# Patient Record
Sex: FEMALE | Race: WHITE | Employment: FULL TIME | URBAN - METROPOLITAN AREA
[De-identification: names, ages, dates, MRNs, and addresses within clinical notes are randomized per-mention and may not be internally consistent; named-entity substitution may affect disease eponyms.]

---

## 2017-08-06 ENCOUNTER — CARE COORDINATION (OUTPATIENT)
Dept: CASE MANAGEMENT | Age: 67
End: 2017-08-06

## 2017-08-11 ENCOUNTER — CARE COORDINATION (OUTPATIENT)
Dept: CASE MANAGEMENT | Age: 67
End: 2017-08-11

## 2018-05-21 PROBLEM — L40.9 PSORIASIS: Status: ACTIVE | Noted: 2018-05-21

## 2018-05-21 PROBLEM — E66.01 MORBID OBESITY (HCC): Status: ACTIVE | Noted: 2018-05-21

## 2019-10-01 ENCOUNTER — TELEPHONE (OUTPATIENT)
Dept: PHARMACY | Facility: CLINIC | Age: 69
End: 2019-10-01

## 2020-03-10 PROBLEM — E13.9 DIABETES 1.5, MANAGED AS TYPE 2 (HCC): Status: ACTIVE | Noted: 2020-03-10

## 2020-03-10 PROBLEM — E78.00 HIGH CHOLESTEROL: Status: ACTIVE | Noted: 2020-03-10

## 2020-03-10 PROBLEM — F41.9 ANXIETY: Status: ACTIVE | Noted: 2020-03-10

## 2021-05-18 ENCOUNTER — HOSPITAL ENCOUNTER (OUTPATIENT)
Dept: ULTRASOUND IMAGING | Age: 71
Discharge: HOME | End: 2021-05-18
Payer: COMMERCIAL

## 2021-05-18 ENCOUNTER — HOSPITAL ENCOUNTER (OUTPATIENT)
Dept: WOMENS IMAGING | Age: 71
Discharge: HOME | End: 2021-05-18
Payer: COMMERCIAL

## 2021-05-18 DIAGNOSIS — N63.0 LUMP OR MASS IN BREAST: ICD-10-CM

## 2021-05-18 DIAGNOSIS — N63.20 MASS OF LEFT BREAST: ICD-10-CM

## 2021-05-18 PROCEDURE — G0279 TOMOSYNTHESIS, MAMMO: HCPCS

## 2021-05-18 PROCEDURE — 76642 ULTRASOUND BREAST LIMITED: CPT

## 2021-08-13 PROBLEM — F13.20 SEDATIVE, HYPNOTIC OR ANXIOLYTIC DEPENDENCE, UNCOMPLICATED (HCC): Status: ACTIVE | Noted: 2021-08-13

## 2021-08-13 PROBLEM — F13.99 SEDATIVE, HYPNOTIC OR ANXIOLYTIC USE, UNSPECIFIED WITH UNSPECIFIED SEDATIVE, HYPNOTIC OR ANXIOLYTIC-INDUCED DISORDER (HCC): Status: ACTIVE | Noted: 2021-08-13

## 2021-08-13 PROBLEM — F13.29 SEDATIVE, HYPNOTIC OR ANXIOLYTIC DEPENDENCE WITH UNSPECIFIED SEDATIVE, HYPNOTIC OR ANXIOLYTIC-INDUCED DISORDER (HCC): Status: ACTIVE | Noted: 2021-08-13

## 2021-08-17 ENCOUNTER — APPOINTMENT (OUTPATIENT)
Dept: GENERAL RADIOLOGY | Age: 71
DRG: 177 | End: 2021-08-17
Payer: COMMERCIAL

## 2021-08-17 ENCOUNTER — HOSPITAL ENCOUNTER (INPATIENT)
Age: 71
LOS: 10 days | Discharge: HOME | DRG: 177 | End: 2021-08-27
Attending: EMERGENCY MEDICINE | Admitting: INTERNAL MEDICINE
Payer: COMMERCIAL

## 2021-08-17 DIAGNOSIS — R73.9 HYPERGLYCEMIA: ICD-10-CM

## 2021-08-17 DIAGNOSIS — J96.01 ACUTE RESPIRATORY FAILURE WITH HYPOXEMIA (HCC): ICD-10-CM

## 2021-08-17 DIAGNOSIS — J18.9 COMMUNITY ACQUIRED PNEUMONIA OF LEFT LOWER LOBE OF LUNG: Primary | ICD-10-CM

## 2021-08-17 PROBLEM — J96.00 ACUTE RESPIRATORY FAILURE (HCC): Status: ACTIVE | Noted: 2021-08-17

## 2021-08-17 LAB
A/G RATIO: 1.2 (ref 1.1–2.2)
ALBUMIN SERPL-MCNC: 3.5 G/DL (ref 3.4–5)
ALP BLD-CCNC: 73 U/L (ref 40–129)
ALT SERPL-CCNC: 17 U/L (ref 10–40)
ANION GAP SERPL CALCULATED.3IONS-SCNC: 14 MMOL/L (ref 3–16)
AST SERPL-CCNC: 23 U/L (ref 15–37)
BASOPHILS ABSOLUTE: 0 K/UL (ref 0–0.2)
BASOPHILS RELATIVE PERCENT: 0 %
BILIRUB SERPL-MCNC: 0.3 MG/DL (ref 0–1)
BILIRUBIN URINE: NEGATIVE
BLOOD, URINE: NEGATIVE
BUN BLDV-MCNC: 17 MG/DL (ref 7–20)
CALCIUM SERPL-MCNC: 9 MG/DL (ref 8.3–10.6)
CHLORIDE BLD-SCNC: 91 MMOL/L (ref 99–110)
CLARITY: CLEAR
CO2: 23 MMOL/L (ref 21–32)
COLOR: YELLOW
CREAT SERPL-MCNC: 1.1 MG/DL (ref 0.6–1.2)
EOSINOPHILS ABSOLUTE: 0 K/UL (ref 0–0.6)
EOSINOPHILS RELATIVE PERCENT: 0 %
EPITHELIAL CELLS, UA: 2 /HPF (ref 0–5)
GFR AFRICAN AMERICAN: 59
GFR NON-AFRICAN AMERICAN: 49
GLOBULIN: 3 G/DL
GLUCOSE BLD-MCNC: 377 MG/DL (ref 70–99)
GLUCOSE BLD-MCNC: 396 MG/DL (ref 70–99)
GLUCOSE URINE: >=1000 MG/DL
HCT VFR BLD CALC: 36.5 % (ref 36–48)
HEMOGLOBIN: 12.1 G/DL (ref 12–16)
HYALINE CASTS: 4 /LPF (ref 0–8)
KETONES, URINE: NEGATIVE MG/DL
LACTIC ACID: 1.9 MMOL/L (ref 0.4–2)
LEUKOCYTE ESTERASE, URINE: ABNORMAL
LIPASE: 28 U/L (ref 13–60)
LYMPHOCYTES ABSOLUTE: 0.4 K/UL (ref 1–5.1)
LYMPHOCYTES RELATIVE PERCENT: 7.8 %
MCH RBC QN AUTO: 31.3 PG (ref 26–34)
MCHC RBC AUTO-ENTMCNC: 33.2 G/DL (ref 31–36)
MCV RBC AUTO: 94.2 FL (ref 80–100)
MICROSCOPIC EXAMINATION: YES
MONOCYTES ABSOLUTE: 0.3 K/UL (ref 0–1.3)
MONOCYTES RELATIVE PERCENT: 5.4 %
NEUTROPHILS ABSOLUTE: 4.1 K/UL (ref 1.7–7.7)
NEUTROPHILS RELATIVE PERCENT: 86.8 %
NITRITE, URINE: NEGATIVE
PDW BLD-RTO: 13.7 % (ref 12.4–15.4)
PERFORMED ON: ABNORMAL
PH UA: 5 (ref 5–8)
PLATELET # BLD: 179 K/UL (ref 135–450)
PMV BLD AUTO: 8.9 FL (ref 5–10.5)
POTASSIUM REFLEX MAGNESIUM: 4.2 MMOL/L (ref 3.5–5.1)
PRO-BNP: 1769 PG/ML (ref 0–124)
PROCALCITONIN: 0.43 NG/ML (ref 0–0.15)
PROTEIN UA: NEGATIVE MG/DL
RBC # BLD: 3.87 M/UL (ref 4–5.2)
RBC UA: 2 /HPF (ref 0–4)
SODIUM BLD-SCNC: 128 MMOL/L (ref 136–145)
SPECIFIC GRAVITY UA: 1.01 (ref 1–1.03)
TOTAL PROTEIN: 6.5 G/DL (ref 6.4–8.2)
TROPONIN: <0.01 NG/ML
URINE REFLEX TO CULTURE: ABNORMAL
URINE TYPE: ABNORMAL
UROBILINOGEN, URINE: 0.2 E.U./DL
WBC # BLD: 4.8 K/UL (ref 4–11)
WBC UA: 6 /HPF (ref 0–5)

## 2021-08-17 PROCEDURE — 2580000003 HC RX 258: Performed by: INTERNAL MEDICINE

## 2021-08-17 PROCEDURE — 87641 MR-STAPH DNA AMP PROBE: CPT

## 2021-08-17 PROCEDURE — U0003 INFECTIOUS AGENT DETECTION BY NUCLEIC ACID (DNA OR RNA); SEVERE ACUTE RESPIRATORY SYNDROME CORONAVIRUS 2 (SARS-COV-2) (CORONAVIRUS DISEASE [COVID-19]), AMPLIFIED PROBE TECHNIQUE, MAKING USE OF HIGH THROUGHPUT TECHNOLOGIES AS DESCRIBED BY CMS-2020-01-R: HCPCS

## 2021-08-17 PROCEDURE — 87040 BLOOD CULTURE FOR BACTERIA: CPT

## 2021-08-17 PROCEDURE — 85025 COMPLETE CBC W/AUTO DIFF WBC: CPT

## 2021-08-17 PROCEDURE — 6360000002 HC RX W HCPCS: Performed by: NURSE PRACTITIONER

## 2021-08-17 PROCEDURE — 94761 N-INVAS EAR/PLS OXIMETRY MLT: CPT

## 2021-08-17 PROCEDURE — 71045 X-RAY EXAM CHEST 1 VIEW: CPT

## 2021-08-17 PROCEDURE — 84145 PROCALCITONIN (PCT): CPT

## 2021-08-17 PROCEDURE — XW033E5 INTRODUCTION OF REMDESIVIR ANTI-INFECTIVE INTO PERIPHERAL VEIN, PERCUTANEOUS APPROACH, NEW TECHNOLOGY GROUP 5: ICD-10-PCS | Performed by: INTERNAL MEDICINE

## 2021-08-17 PROCEDURE — 96375 TX/PRO/DX INJ NEW DRUG ADDON: CPT

## 2021-08-17 PROCEDURE — 6370000000 HC RX 637 (ALT 250 FOR IP): Performed by: INTERNAL MEDICINE

## 2021-08-17 PROCEDURE — 6360000002 HC RX W HCPCS: Performed by: INTERNAL MEDICINE

## 2021-08-17 PROCEDURE — 81001 URINALYSIS AUTO W/SCOPE: CPT

## 2021-08-17 PROCEDURE — 99285 EMERGENCY DEPT VISIT HI MDM: CPT

## 2021-08-17 PROCEDURE — 84484 ASSAY OF TROPONIN QUANT: CPT

## 2021-08-17 PROCEDURE — 83880 ASSAY OF NATRIURETIC PEPTIDE: CPT

## 2021-08-17 PROCEDURE — 83690 ASSAY OF LIPASE: CPT

## 2021-08-17 PROCEDURE — 96365 THER/PROPH/DIAG IV INF INIT: CPT

## 2021-08-17 PROCEDURE — 94640 AIRWAY INHALATION TREATMENT: CPT

## 2021-08-17 PROCEDURE — 2580000003 HC RX 258: Performed by: NURSE PRACTITIONER

## 2021-08-17 PROCEDURE — 93005 ELECTROCARDIOGRAM TRACING: CPT | Performed by: NURSE PRACTITIONER

## 2021-08-17 PROCEDURE — 80053 COMPREHEN METABOLIC PANEL: CPT

## 2021-08-17 PROCEDURE — U0005 INFEC AGEN DETEC AMPLI PROBE: HCPCS

## 2021-08-17 PROCEDURE — 6370000000 HC RX 637 (ALT 250 FOR IP): Performed by: NURSE PRACTITIONER

## 2021-08-17 PROCEDURE — 2060000000 HC ICU INTERMEDIATE R&B

## 2021-08-17 PROCEDURE — 83605 ASSAY OF LACTIC ACID: CPT

## 2021-08-17 PROCEDURE — 2700000000 HC OXYGEN THERAPY PER DAY

## 2021-08-17 RX ORDER — METHYLPREDNISOLONE SODIUM SUCCINATE 125 MG/2ML
125 INJECTION, POWDER, LYOPHILIZED, FOR SOLUTION INTRAMUSCULAR; INTRAVENOUS ONCE
Status: COMPLETED | OUTPATIENT
Start: 2021-08-17 | End: 2021-08-17

## 2021-08-17 RX ORDER — PANTOPRAZOLE SODIUM 40 MG/1
40 TABLET, DELAYED RELEASE ORAL
Status: DISCONTINUED | OUTPATIENT
Start: 2021-08-18 | End: 2021-08-26

## 2021-08-17 RX ORDER — IPRATROPIUM BROMIDE AND ALBUTEROL SULFATE 2.5; .5 MG/3ML; MG/3ML
1 SOLUTION RESPIRATORY (INHALATION)
Status: DISCONTINUED | OUTPATIENT
Start: 2021-08-18 | End: 2021-08-18

## 2021-08-17 RX ORDER — HYDROCODONE BITARTRATE AND ACETAMINOPHEN 5; 325 MG/1; MG/1
1 TABLET ORAL EVERY 8 HOURS PRN
Status: DISCONTINUED | OUTPATIENT
Start: 2021-08-17 | End: 2021-08-27 | Stop reason: HOSPADM

## 2021-08-17 RX ORDER — METHYLPREDNISOLONE SODIUM SUCCINATE 40 MG/ML
40 INJECTION, POWDER, LYOPHILIZED, FOR SOLUTION INTRAMUSCULAR; INTRAVENOUS EVERY 6 HOURS
Status: DISCONTINUED | OUTPATIENT
Start: 2021-08-18 | End: 2021-08-18

## 2021-08-17 RX ORDER — ALBUTEROL SULFATE 90 UG/1
4 AEROSOL, METERED RESPIRATORY (INHALATION) ONCE
Status: COMPLETED | OUTPATIENT
Start: 2021-08-17 | End: 2021-08-17

## 2021-08-17 RX ORDER — LEVOFLOXACIN 5 MG/ML
500 INJECTION, SOLUTION INTRAVENOUS EVERY 24 HOURS
Status: DISCONTINUED | OUTPATIENT
Start: 2021-08-17 | End: 2021-08-21

## 2021-08-17 RX ORDER — IPRATROPIUM BROMIDE AND ALBUTEROL SULFATE 2.5; .5 MG/3ML; MG/3ML
1 SOLUTION RESPIRATORY (INHALATION) ONCE
Status: DISCONTINUED | OUTPATIENT
Start: 2021-08-17 | End: 2021-08-17

## 2021-08-17 RX ORDER — LISINOPRIL AND HYDROCHLOROTHIAZIDE 20; 12.5 MG/1; MG/1
1 TABLET ORAL DAILY
Status: DISCONTINUED | OUTPATIENT
Start: 2021-08-18 | End: 2021-08-27 | Stop reason: HOSPADM

## 2021-08-17 RX ORDER — SODIUM CHLORIDE 9 MG/ML
INJECTION, SOLUTION INTRAVENOUS CONTINUOUS
Status: DISCONTINUED | OUTPATIENT
Start: 2021-08-17 | End: 2021-08-18

## 2021-08-17 RX ORDER — PROMETHAZINE HYDROCHLORIDE AND CODEINE PHOSPHATE 6.25; 1 MG/5ML; MG/5ML
5 SOLUTION ORAL ONCE
Status: COMPLETED | OUTPATIENT
Start: 2021-08-17 | End: 2021-08-17

## 2021-08-17 RX ORDER — ALPRAZOLAM 0.5 MG/1
0.5 TABLET ORAL 3 TIMES DAILY PRN
Status: DISCONTINUED | OUTPATIENT
Start: 2021-08-17 | End: 2021-08-27 | Stop reason: HOSPADM

## 2021-08-17 RX ORDER — ALLOPURINOL 300 MG/1
300 TABLET ORAL DAILY
Status: DISCONTINUED | OUTPATIENT
Start: 2021-08-18 | End: 2021-08-27 | Stop reason: HOSPADM

## 2021-08-17 RX ADMIN — METHYLPREDNISOLONE SODIUM SUCCINATE 125 MG: 125 INJECTION, POWDER, FOR SOLUTION INTRAMUSCULAR; INTRAVENOUS at 19:09

## 2021-08-17 RX ADMIN — SODIUM CHLORIDE: 9 INJECTION, SOLUTION INTRAVENOUS at 22:19

## 2021-08-17 RX ADMIN — Medication 5 ML: at 19:08

## 2021-08-17 RX ADMIN — Medication 4 PUFF: at 18:41

## 2021-08-17 RX ADMIN — LEVOFLOXACIN 500 MG: 5 INJECTION, SOLUTION INTRAVENOUS at 23:22

## 2021-08-17 RX ADMIN — INSULIN LISPRO 3 UNITS: 100 INJECTION, SOLUTION INTRAVENOUS; SUBCUTANEOUS at 23:23

## 2021-08-17 RX ADMIN — AZITHROMYCIN MONOHYDRATE 500 MG: 500 INJECTION, POWDER, LYOPHILIZED, FOR SOLUTION INTRAVENOUS at 20:04

## 2021-08-17 RX ADMIN — CEFTRIAXONE 1000 MG: 1 INJECTION, POWDER, FOR SOLUTION INTRAMUSCULAR; INTRAVENOUS at 19:19

## 2021-08-17 ASSESSMENT — ENCOUNTER SYMPTOMS
COUGH: 1
NAUSEA: 0
SORE THROAT: 0
SINUS PRESSURE: 0
VOMITING: 0
CONSTIPATION: 0
SINUS PAIN: 0
DIARRHEA: 0
COLOR CHANGE: 0
ABDOMINAL DISTENTION: 0
SHORTNESS OF BREATH: 0
ABDOMINAL PAIN: 0
BACK PAIN: 0

## 2021-08-17 ASSESSMENT — PAIN SCALES - WONG BAKER
WONGBAKER_NUMERICALRESPONSE: 0
WONGBAKER_NUMERICALRESPONSE: 0

## 2021-08-17 ASSESSMENT — PAIN SCALES - GENERAL: PAINLEVEL_OUTOF10: 0

## 2021-08-17 NOTE — ED NOTES
Patient aware of need for urine sample, patient unable to urinate at this time. Provider aware. Patient will call out when able to provide sample.        Emily Nunes RN  08/17/21 8320

## 2021-08-17 NOTE — ED NOTES
Bed: E-42  Expected date: 8/17/21  Expected time: 5:07 PM  Means of arrival: [facility]  Comments:  70F congestion/cough      Kae Soliman RN  08/17/21 6295

## 2021-08-17 NOTE — ED PROVIDER NOTES
Attending Supervisory Note/Shared Visit   I have personally performed a face to face diagnostic evaluation on this patient. I have reviewed the mid-levels findings and agree. History and Exam by me shows her white female in no acute distress. She has a 5-day history of fatigue and cough. Heart: Regular rate and rhythm. No murmurs or gallops noted. Lungs: Breath sounds decreased in the bases bilaterally with bibasilar rales. No wheezes. No retractions or accessory muscle use. Speaks in full sentences. Abdomen: Soft, nondistended, nontender. Musculoskeletal: No lower extremity edema. Intact symmetrical distal pulses. EKG: Normal sinus rhythm, rate of 92, left ventricular hypertrophy. Rhythm strip shows sinus rhythm with a rate of 92, LA interval 140 ms,  ms with no other ectopy as interpreted by me. No old EKG available for comparison. Chest x-ray: Left mid and basilar infiltrates consistent with pneumonia. Reviewed. H&H are normal.  White blood count 4800 with 87 neutrophils and 8 lymphs. Sodium 128 with potassium 4.2.  BUN of 17 with a creatinine 1.1. Glucose 377. Liver enzymes are normal.  Lipase of 28. Troponin less than 0.01. BNP of 1769. Lactic acid of 1.9. Procalcitonin 0.43. Patient's clinical presentation is consistent with pneumonia. IV antibiotics were initiated. The patient's primary care provider was consulted for admission. Test results, diagnosis, and treatment plan were discussed with the patient. She understands the treatment plan and need for admission and is agreeable.       (Please note that portions of this note were completed with a voice recognition program.  Efforts were made to edit the dictations but occasionally words are mis-transcribed.)    Sary Sanderson MD  Attending Emergency Physician        Checo Funk MD  08/17/21 3929

## 2021-08-17 NOTE — ED PROVIDER NOTES
[address] [facility]        Pt Name: Abraham Amaya  MRN: [id number]  [place] 1950  Date of evaluation: 8/17/2021  Provider: ELENO Mitchell - CNP  PCP: Fito Fallon MD  Note Started: 5:40 PM EDT        I have seen and evaluated this patient with my supervising physician Pablito Richter MD.    [facility]       Chief Complaint   Patient presents with    Cough     x5 days, productive with thick white mucus. Denies respiratory HX, deneis chest pain, dneies shortness of breath. 96% on room air on scene per EMS, 86% on room air on arrival.    Other     No appetite       HISTORY OF PRESENT ILLNESS   (Location, Timing/Onset, Context/Setting, Quality, Duration, Modifying Factors, Severity, Associated Signs and Symptoms)  Note limiting factors. Chief Complaint: fatigue     Abraham Amaya is a 69 y.o. female with PMH sniffing for HTN, HLD, and DMII who presents to the emergency department today complaining of cough and fatigue. Onset was 5 days ago. Symptoms started spontaneously and been constant. She has no energy and has not been eating or drinking. She denies chest pain and shortness of breath. No abdominal pain. No headache dizziness numbness or weakness. On arrival to ER she was 86% on room air. Nursing Notes were all reviewed and agreed with or any disagreements were addressed in the HPI. REVIEW OF SYSTEMS    (2-9 systems for level 4, 10 or more for level 5)     Review of Systems   Constitutional: Positive for activity change, appetite change and fatigue. Negative for diaphoresis and fever. HENT: Negative for congestion, sinus pressure, sinus pain and sore throat. Eyes: Negative for visual disturbance. Respiratory: Positive for cough. Negative for shortness of breath. Cardiovascular: Negative for chest pain.    Gastrointestinal: Negative for abdominal distention, abdominal pain, constipation, diarrhea, nausea and vomiting. Genitourinary: Negative for dysuria, flank pain, frequency and hematuria. Musculoskeletal: Negative for back pain, neck pain and neck stiffness. Skin: Negative for color change, rash and wound. Allergic/Immunologic: Negative for immunocompromised state. Neurological: Positive for weakness. Negative for dizziness, syncope, light-headedness, numbness and headaches. Hematological: Negative for adenopathy. Psychiatric/Behavioral: Negative for confusion. All other systems reviewed and are negative. Positives and Pertinent negatives as per HPI. Except as noted above in the ROS, all other systems were reviewed and negative. PAST MEDICAL HISTORY     Past Medical History:   Diagnosis Date    Anxiety     Arthritis     Chronic back pain     Depression     Diabetes 1.5, managed as type 2 ([facility] [address]     Hypertension     Obesity     Psoriasis          SURGICAL HISTORY     Past Surgical History:   Procedure Laterality Date     SECTION      LEG SURGERY Right          CURRENTMEDICATIONS       Previous Medications    ALLOPURINOL (ZYLOPRIM) 300 MG TABLET    Take 1 tablet by mouth daily    ALPRAZOLAM (XANAX) 0.5 MG TABLET    TAKE ONE TABLET BY MOUTH THREE TIMES A DAY AS NEEDED FOR ANXIETY OR SLEEP    COAL TAR EXTRACT 2 % OINT    Apply bid    FUROSEMIDE (LASIX) 40 MG TABLET    Take 1 tablet by mouth 2 times daily    HYDROCODONE-ACETAMINOPHEN (NORCO) 5-325 MG PER TABLET    Take 1 tablet by mouth every 8 hours as needed for Pain for up to 30 days. KETOCONAZOLE (NIZORAL) 2 % CREAM    Apply topically daily.     LEVOFLOXACIN (LEVAQUIN) 500 MG TABLET    Take 1 tablet by mouth daily for 7 days    LISINOPRIL-HYDROCHLOROTHIAZIDE (PRINZIDE;ZESTORETIC) 20-12.5 MG PER TABLET    TAKE ONE TABLET BY MOUTH DAILY    METFORMIN (GLUCOPHAGE) 1000 MG TABLET    TAKE ONE TABLET BY MOUTH TWICE A DAY WITH MEALS    NYSTATIN (MYCOSTATIN) 261221 UNIT/ML SUSPENSION    TAKE FIVE MILLILITERS BY MOUTH FOUR TIMES A DAY FOR 10 DAYS (RETAIN IN MOUTH AS LONG AS POSSIBLE)    PREDNISONE (DELTASONE) 20 MG TABLET    2 tab po daily for 4 days then 1 tab po daily for 3 days    TIZANIDINE (ZANAFLEX) 4 MG TABLET    Take 1 tablet by mouth nightly as needed (pain)         ALLERGIES     Penicillins    FAMILYHISTORY       Family History   Problem Relation Age of Onset    High Blood Pressure Mother     High Blood Pressure Father           SOCIAL HISTORY       Social History     Tobacco Use    Smoking status: Never Smoker    Smokeless tobacco: Never Used   Substance Use Topics    Alcohol use: No    Drug use: No       SCREENINGS             PHYSICAL EXAM    (up to 7 for level 4, 8 or more for level 5)     ED Triage Vitals [08/17/21 1726]   BP Temp Temp Source Pulse Resp SpO2 Height Weight   (!) 125/51 99.3 °F (37.4 °C) Oral 106 27 (S) (!) 86 % -- --       Physical Exam  Vitals and nursing note reviewed. Constitutional:       General: She is not in acute distress. Appearance: Normal appearance. She is well-developed. She is morbidly obese. She is not toxic-appearing. HENT:      Head: Normocephalic and atraumatic. Mouth/Throat:      Mouth: Mucous membranes are dry. Eyes:      General: No scleral icterus. Conjunctiva/sclera: Conjunctivae normal.   Neck:      Vascular: No JVD. Cardiovascular:      Rate and Rhythm: Regular rhythm. Tachycardia present. Heart sounds: Normal heart sounds. Pulmonary:      Effort: Pulmonary effort is normal. No respiratory distress. Breath sounds: Rales present. Abdominal:      General: There is no distension. Palpations: Abdomen is soft. Abdomen is not rigid. Tenderness: There is no abdominal tenderness. Musculoskeletal:         General: Normal range of motion. Cervical back: Full passive range of motion without pain and neck supple. No rigidity. Right lower leg: Edema present. Left lower leg: Edema present. Skin:     General: Skin is warm and dry. Capillary Refill: Capillary refill takes less than 2 seconds. Findings: No rash. Neurological:      General: No focal deficit present. Mental Status: She is alert and oriented to person, place, and time. Cranial Nerves: Cranial nerves are intact. Sensory: Sensation is intact. Motor: Motor function is intact.    Psychiatric:         Mood and Affect: Mood normal.         DIAGNOSTIC RESULTS   LABS:    Labs Reviewed   CBC WITH AUTO DIFFERENTIAL - Abnormal; Notable for the following components:       Result Value    RBC 3.87 (*)     Lymphocytes Absolute 0.4 (*)     All other components within normal limits    Narrative:     Performed at:  [address] [place] [address]   Phone [phone]   COMPREHENSIVE METABOLIC PANEL W/ REFLEX TO MG FOR LOW K - Abnormal; Notable for the following components:    Sodium 128 (*)     Chloride 91 (*)     Glucose 377 (*)     GFR Non- 49 (*)     GFR  59 (*)     All other components within normal limits    Narrative:     Performed at:  [address] [place] [address]   Phone [phone]   BRAIN NATRIURETIC PEPTIDE - Abnormal; Notable for the following components:    Pro-BNP 1,769 (*)     All other components within normal limits    Narrative:     Performed at:  [address] [place] [address]   Phone [phone]   PROCALCITONIN - Abnormal; Notable for the following components:    Procalcitonin 0.43 (*)     All other components within normal limits    Narrative:     Performed at:  [address] [place] [address]   Phone [phone]   CULTURE, BLOOD 1   CULTURE, BLOOD 2   LIPASE    Narrative:     Performed at:  [facility]  [address] [facility] [place] [address]   Phone [phone]   TROPONIN    Narrative:     Performed at:  [facility]  [address] [place] [address]   Phone [phone]   LACTIC ACID, PLASMA    Narrative:     Performed at:  [facility]  [address] [facility] [place] [address] [place] [address]   Phone [phone]   URINE RT REFLEX TO CULTURE   COVID-19       When ordered only abnormal lab results are displayed. All other labs were within normal range or not returned as of this dictation. EKG: When ordered, EKG's are interpreted by the Emergency Department Physician in the absence of a cardiologist.  Please see their note for interpretation of EKG. RADIOLOGY:   Non-plain film images such as CT, Ultrasound and MRI are read by the radiologist. Plain radiographic images are visualized and preliminarily interpreted by the ED Provider with the below findings:        Interpretation per the Radiologist below, if available at the time of this note:    XR CHEST PORTABLE   Final Result   Left mid and bibasilar infiltrates consistent with pneumonia. No results found. PROCEDURES   Unless otherwise noted below, none     Procedures    CRITICAL CARE TIME   CRITICAL CARE NOTE:  There was a high probability of clinically significant life-threatening deterioration of the patient's condition requiring my urgent intervention. Total critical care time was at least 40 minutes. This includes vital sign monitoring, pulse oximetry monitoring, telemetry monitoring, clinical response to the IV medications, reviewing the nursing notes, consultation time, dictation/documentation time, and interpretation of the labwork. This excludes any separately billable procedures performed.       CONSULTS:  IP CONSULT TO PRIMARY CARE PROVIDER      EMERGENCY DEPARTMENT COURSE and DIFFERENTIAL DIAGNOSIS/MDM:   Vitals:    Vitals:    08/17/21 1812 08/17/21 1841 time was given to answer questions, a plan was proposed and they agreed with plan. FINAL IMPRESSION      1. Community acquired pneumonia of left lower lobe of lung    2. Acute respiratory failure with hypoxemia (HCC)    3. Hyperglycemia          DISPOSITION/PLAN   DISPOSITION Admitted 08/17/2021 08:00:33 PM      PATIENT REFERRED TO:  No follow-up provider specified.     DISCHARGE MEDICATIONS:  New Prescriptions    No medications on file       DISCONTINUED MEDICATIONS:  Discontinued Medications    ALPRAZOLAM (XANAX) 0.5 MG TABLET    TAKE ONE TABLET BY MOUTH THREE TIMES A DAY AS NEEDED FOR SLEEP OR ANXIETY FOR UP TO 30 DAYS              (Please note that portions of this note were completed with a voice recognition program.  Efforts were made to edit the dictations but occasionally words are mis-transcribed.)    ELENO Peraza CNP (electronically signed)            ELENO Peraza CNP  08/17/21 2003

## 2021-08-17 NOTE — ED TRIAGE NOTES
[facility] with complaint of cough and malaise x 25 days. Per EMS report: Vital signs stable during transport. On arrival patient noted to have SPO2 of 86% on room air. Patient denies cardiac or respiratory hx. Reports productive cough with thick white sputum at home. O2 paced on arrival. NP to bedside. Patient denies chest pain, shortness of breath, denies fever at home. Patient resting in bed, respirations even and easy.

## 2021-08-18 ENCOUNTER — APPOINTMENT (OUTPATIENT)
Dept: CT IMAGING | Age: 71
DRG: 177 | End: 2021-08-18
Payer: COMMERCIAL

## 2021-08-18 LAB
A/G RATIO: 0.9 (ref 1.1–2.2)
ALBUMIN SERPL-MCNC: 3.3 G/DL (ref 3.4–5)
ALP BLD-CCNC: 77 U/L (ref 40–129)
ALT SERPL-CCNC: 18 U/L (ref 10–40)
ANION GAP SERPL CALCULATED.3IONS-SCNC: 21 MMOL/L (ref 3–16)
AST SERPL-CCNC: 24 U/L (ref 15–37)
BILIRUB SERPL-MCNC: <0.2 MG/DL (ref 0–1)
BUN BLDV-MCNC: 17 MG/DL (ref 7–20)
C-REACTIVE PROTEIN: 239.7 MG/L (ref 0–5.1)
CALCIUM SERPL-MCNC: 9.3 MG/DL (ref 8.3–10.6)
CHLORIDE BLD-SCNC: 97 MMOL/L (ref 99–110)
CO2: 17 MMOL/L (ref 21–32)
CREAT SERPL-MCNC: 0.7 MG/DL (ref 0.6–1.2)
CREAT SERPL-MCNC: 1 MG/DL (ref 0.6–1.2)
FERRITIN: 611.8 NG/ML (ref 15–150)
GFR AFRICAN AMERICAN: >60
GFR AFRICAN AMERICAN: >60
GFR NON-AFRICAN AMERICAN: 55
GFR NON-AFRICAN AMERICAN: >60
GLOBULIN: 3.5 G/DL
GLUCOSE BLD-MCNC: 366 MG/DL (ref 70–99)
GLUCOSE BLD-MCNC: 414 MG/DL (ref 70–99)
GLUCOSE BLD-MCNC: 418 MG/DL (ref 70–99)
GLUCOSE BLD-MCNC: 448 MG/DL (ref 70–99)
GLUCOSE BLD-MCNC: 513 MG/DL (ref 70–99)
LV EF: 53 %
LVEF MODALITY: NORMAL
PERFORMED ON: ABNORMAL
POTASSIUM REFLEX MAGNESIUM: 4.9 MMOL/L (ref 3.5–5.1)
PROCALCITONIN: 0.33 NG/ML (ref 0–0.15)
SARS-COV-2: DETECTED
SODIUM BLD-SCNC: 135 MMOL/L (ref 136–145)
TOTAL PROTEIN: 6.8 G/DL (ref 6.4–8.2)

## 2021-08-18 PROCEDURE — 71250 CT THORAX DX C-: CPT

## 2021-08-18 PROCEDURE — 6370000000 HC RX 637 (ALT 250 FOR IP): Performed by: INTERNAL MEDICINE

## 2021-08-18 PROCEDURE — 82565 ASSAY OF CREATININE: CPT

## 2021-08-18 PROCEDURE — 2700000000 HC OXYGEN THERAPY PER DAY

## 2021-08-18 PROCEDURE — 6360000002 HC RX W HCPCS: Performed by: INTERNAL MEDICINE

## 2021-08-18 PROCEDURE — 93010 ELECTROCARDIOGRAM REPORT: CPT | Performed by: INTERNAL MEDICINE

## 2021-08-18 PROCEDURE — 80053 COMPREHEN METABOLIC PANEL: CPT

## 2021-08-18 PROCEDURE — 93306 TTE W/DOPPLER COMPLETE: CPT

## 2021-08-18 PROCEDURE — 2500000003 HC RX 250 WO HCPCS: Performed by: INTERNAL MEDICINE

## 2021-08-18 PROCEDURE — 86140 C-REACTIVE PROTEIN: CPT

## 2021-08-18 PROCEDURE — 94640 AIRWAY INHALATION TREATMENT: CPT

## 2021-08-18 PROCEDURE — 99223 1ST HOSP IP/OBS HIGH 75: CPT | Performed by: INTERNAL MEDICINE

## 2021-08-18 PROCEDURE — 36415 COLL VENOUS BLD VENIPUNCTURE: CPT

## 2021-08-18 PROCEDURE — 84145 PROCALCITONIN (PCT): CPT

## 2021-08-18 PROCEDURE — 94761 N-INVAS EAR/PLS OXIMETRY MLT: CPT

## 2021-08-18 PROCEDURE — 2060000000 HC ICU INTERMEDIATE R&B

## 2021-08-18 PROCEDURE — 2580000003 HC RX 258: Performed by: INTERNAL MEDICINE

## 2021-08-18 PROCEDURE — 82728 ASSAY OF FERRITIN: CPT

## 2021-08-18 RX ORDER — ACETAMINOPHEN 325 MG/1
650 TABLET ORAL EVERY 4 HOURS PRN
Status: DISCONTINUED | OUTPATIENT
Start: 2021-08-18 | End: 2021-08-27 | Stop reason: HOSPADM

## 2021-08-18 RX ORDER — DEXTROSE MONOHYDRATE 25 G/50ML
12.5 INJECTION, SOLUTION INTRAVENOUS PRN
Status: DISCONTINUED | OUTPATIENT
Start: 2021-08-18 | End: 2021-08-18 | Stop reason: SDUPTHER

## 2021-08-18 RX ORDER — NICOTINE POLACRILEX 4 MG
15 LOZENGE BUCCAL PRN
Status: DISCONTINUED | OUTPATIENT
Start: 2021-08-18 | End: 2021-08-27 | Stop reason: HOSPADM

## 2021-08-18 RX ORDER — DEXTROSE MONOHYDRATE 50 MG/ML
100 INJECTION, SOLUTION INTRAVENOUS PRN
Status: DISCONTINUED | OUTPATIENT
Start: 2021-08-18 | End: 2021-08-27 | Stop reason: HOSPADM

## 2021-08-18 RX ORDER — 0.9 % SODIUM CHLORIDE 0.9 %
30 INTRAVENOUS SOLUTION INTRAVENOUS PRN
Status: DISCONTINUED | OUTPATIENT
Start: 2021-08-18 | End: 2021-08-27 | Stop reason: HOSPADM

## 2021-08-18 RX ORDER — INSULIN GLARGINE 100 [IU]/ML
10 INJECTION, SOLUTION SUBCUTANEOUS NIGHTLY
Status: DISCONTINUED | OUTPATIENT
Start: 2021-08-18 | End: 2021-08-19

## 2021-08-18 RX ORDER — METHYLPREDNISOLONE SODIUM SUCCINATE 40 MG/ML
40 INJECTION, POWDER, LYOPHILIZED, FOR SOLUTION INTRAMUSCULAR; INTRAVENOUS EVERY 12 HOURS
Status: DISCONTINUED | OUTPATIENT
Start: 2021-08-18 | End: 2021-08-27 | Stop reason: HOSPADM

## 2021-08-18 RX ORDER — NICOTINE POLACRILEX 4 MG
15 LOZENGE BUCCAL PRN
Status: DISCONTINUED | OUTPATIENT
Start: 2021-08-18 | End: 2021-08-18 | Stop reason: SDUPTHER

## 2021-08-18 RX ORDER — DEXTROSE MONOHYDRATE 25 G/50ML
12.5 INJECTION, SOLUTION INTRAVENOUS PRN
Status: DISCONTINUED | OUTPATIENT
Start: 2021-08-18 | End: 2021-08-27 | Stop reason: HOSPADM

## 2021-08-18 RX ORDER — DEXTROSE MONOHYDRATE 50 MG/ML
100 INJECTION, SOLUTION INTRAVENOUS PRN
Status: DISCONTINUED | OUTPATIENT
Start: 2021-08-18 | End: 2021-08-18 | Stop reason: SDUPTHER

## 2021-08-18 RX ADMIN — INSULIN LISPRO 3 UNITS: 100 INJECTION, SOLUTION INTRAVENOUS; SUBCUTANEOUS at 18:17

## 2021-08-18 RX ADMIN — SODIUM CHLORIDE: 9 INJECTION, SOLUTION INTRAVENOUS at 12:53

## 2021-08-18 RX ADMIN — NYSTATIN 500000 UNITS: 100000 SUSPENSION ORAL at 21:37

## 2021-08-18 RX ADMIN — INSULIN LISPRO 9 UNITS: 100 INJECTION, SOLUTION INTRAVENOUS; SUBCUTANEOUS at 22:25

## 2021-08-18 RX ADMIN — PANTOPRAZOLE SODIUM 40 MG: 40 TABLET, DELAYED RELEASE ORAL at 07:07

## 2021-08-18 RX ADMIN — INSULIN LISPRO 18 UNITS: 100 INJECTION, SOLUTION INTRAVENOUS; SUBCUTANEOUS at 12:55

## 2021-08-18 RX ADMIN — INSULIN LISPRO 3 UNITS: 100 INJECTION, SOLUTION INTRAVENOUS; SUBCUTANEOUS at 12:54

## 2021-08-18 RX ADMIN — INSULIN LISPRO 5 UNITS: 100 INJECTION, SOLUTION INTRAVENOUS; SUBCUTANEOUS at 08:53

## 2021-08-18 RX ADMIN — METHYLPREDNISOLONE SODIUM SUCCINATE 40 MG: 40 INJECTION, POWDER, FOR SOLUTION INTRAMUSCULAR; INTRAVENOUS at 01:21

## 2021-08-18 RX ADMIN — METHYLPREDNISOLONE SODIUM SUCCINATE 40 MG: 40 INJECTION, POWDER, FOR SOLUTION INTRAMUSCULAR; INTRAVENOUS at 07:07

## 2021-08-18 RX ADMIN — IPRATROPIUM BROMIDE AND ALBUTEROL 1 PUFF: 20; 100 SPRAY, METERED RESPIRATORY (INHALATION) at 19:47

## 2021-08-18 RX ADMIN — METHYLPREDNISOLONE SODIUM SUCCINATE 40 MG: 40 INJECTION, POWDER, FOR SOLUTION INTRAMUSCULAR; INTRAVENOUS at 18:13

## 2021-08-18 RX ADMIN — ENOXAPARIN SODIUM 40 MG: 40 INJECTION SUBCUTANEOUS at 21:37

## 2021-08-18 RX ADMIN — REMDESIVIR 200 MG: 100 INJECTION, POWDER, LYOPHILIZED, FOR SOLUTION INTRAVENOUS at 15:28

## 2021-08-18 RX ADMIN — BARICITINIB 4 MG: 2 TABLET, FILM COATED ORAL at 15:28

## 2021-08-18 RX ADMIN — Medication 1500 MG: at 02:30

## 2021-08-18 RX ADMIN — LISINOPRIL AND HYDROCHLOROTHIAZIDE 1 TABLET: 12.5; 2 TABLET ORAL at 08:50

## 2021-08-18 RX ADMIN — ENOXAPARIN SODIUM 40 MG: 40 INJECTION SUBCUTANEOUS at 08:48

## 2021-08-18 RX ADMIN — LEVOFLOXACIN 500 MG: 5 INJECTION, SOLUTION INTRAVENOUS at 22:49

## 2021-08-18 RX ADMIN — INSULIN GLARGINE 10 UNITS: 100 INJECTION, SOLUTION SUBCUTANEOUS at 22:24

## 2021-08-18 RX ADMIN — NYSTATIN 500000 UNITS: 100000 SUSPENSION ORAL at 12:54

## 2021-08-18 RX ADMIN — IPRATROPIUM BROMIDE AND ALBUTEROL 1 PUFF: 20; 100 SPRAY, METERED RESPIRATORY (INHALATION) at 12:07

## 2021-08-18 RX ADMIN — NYSTATIN 500000 UNITS: 100000 SUSPENSION ORAL at 18:14

## 2021-08-18 RX ADMIN — ACETAMINOPHEN 650 MG: 325 TABLET ORAL at 15:28

## 2021-08-18 RX ADMIN — INSULIN LISPRO 18 UNITS: 100 INJECTION, SOLUTION INTRAVENOUS; SUBCUTANEOUS at 18:18

## 2021-08-18 RX ADMIN — ALLOPURINOL 300 MG: 300 TABLET ORAL at 08:50

## 2021-08-18 RX ADMIN — IPRATROPIUM BROMIDE AND ALBUTEROL 1 PUFF: 20; 100 SPRAY, METERED RESPIRATORY (INHALATION) at 16:26

## 2021-08-18 RX ADMIN — Medication 1500 MG: at 18:13

## 2021-08-18 RX ADMIN — IPRATROPIUM BROMIDE AND ALBUTEROL 1 PUFF: 20; 100 SPRAY, METERED RESPIRATORY (INHALATION) at 08:48

## 2021-08-18 ASSESSMENT — PAIN SCALES - WONG BAKER
WONGBAKER_NUMERICALRESPONSE: 0

## 2021-08-18 ASSESSMENT — PAIN - FUNCTIONAL ASSESSMENT: PAIN_FUNCTIONAL_ASSESSMENT: ACTIVITIES ARE NOT PREVENTED

## 2021-08-18 ASSESSMENT — PAIN SCALES - GENERAL
PAINLEVEL_OUTOF10: 3
PAINLEVEL_OUTOF10: 0

## 2021-08-18 ASSESSMENT — PAIN DESCRIPTION - LOCATION: LOCATION: BACK

## 2021-08-18 ASSESSMENT — PAIN DESCRIPTION - DESCRIPTORS: DESCRIPTORS: BURNING

## 2021-08-18 ASSESSMENT — PAIN DESCRIPTION - ONSET: ONSET: ON-GOING

## 2021-08-18 ASSESSMENT — PAIN DESCRIPTION - PROGRESSION: CLINICAL_PROGRESSION: NOT CHANGED

## 2021-08-18 ASSESSMENT — PAIN DESCRIPTION - ORIENTATION: ORIENTATION: LOWER

## 2021-08-18 ASSESSMENT — PAIN DESCRIPTION - FREQUENCY: FREQUENCY: CONTINUOUS

## 2021-08-18 ASSESSMENT — PAIN DESCRIPTION - PAIN TYPE: TYPE: ACUTE PAIN

## 2021-08-18 NOTE — PROGRESS NOTES
4 Eyes Skin Assessment     NAME:  Maria Isabel Becker  YOB: 1950  MEDICAL RECORD NUMBER:  [id number]    The patient is being assess for  Admission    I agree that 2 RN's have performed a thorough Head to Toe Skin Assessment on the patient. ALL assessment sites listed below have been assessed. Areas assessed by both nurses:    Head, Face, Ears, Shoulders, Back, Chest, Arms, Elbows, Hands, Sacrum. Buttock, Coccyx, Ischium and Legs. Feet and Heels        Does the Patient have a Wound?  No noted wound(s)       Renaldo Prevention initiated:  Yes   Wound Care Orders initiated:  No    Pressure Injury (Stage 3,4, Unstageable, DTI, NWPT, and Complex wounds) if present place consult order under [de-identified] No    New and Established Ostomies if present place consult order under : NA      Nurse 1 eSignature: Electronically signed by Joshua Gonzalez RN on 8/18/21 at 2:02 AM EDT    **SHARE this note so that the co-signing nurse is able to place an eSignature**    Nurse 2 eSignature: Electronically signed by Howard Kevin RN on 8/18/21 at 6:22 AM EDT

## 2021-08-18 NOTE — PLAN OF CARE
Problem: Falls - Risk of:  Goal: Will remain free from falls  Description: Will remain free from falls  Outcome: Ongoing  Goal: Absence of physical injury  Description: Absence of physical injury  Outcome: Ongoing     Problem: Gas Exchange - Impaired  Goal: Absence of hypoxia  Outcome: Ongoing     Problem: Breathing Pattern - Ineffective  Goal: Ability to achieve and maintain a regular respiratory rate  Outcome: Ongoing     Problem: Isolation Precautions - Risk of Spread of Infection  Goal: Prevent transmission of infection  Outcome: Ongoing

## 2021-08-18 NOTE — RT PROTOCOL NOTE
orders, one with BID frequency and one with frequency of every 2 hours PRN wheezing or increased work of breathing using Per Protocol order mode. Repeat RT Therapy Protocol Assessment with second treatment then BID and as needed. If Albuterol Inhaler not tolerated or not effective, then discontinue the Albuterol Inhaler orders and enter two Albuterol Nebulizer orders with same frequencies and PRN reasons. 11-13 - discontinue any other Inpatient aerosolized bronchodilator medication orders and enter DuoNeb Nebulizer orders QID frequency and an Albuterol Nebulizer order every 2 hours PRN wheezing or increased work of breathing using Per Protocol order mode. Repeat RT Therapy Protocol Assessment with second treatment then QID and as needed. Greater than 13 - discontinue any other Inpatient bronchodilator aerosolized medication orders and enter DuoNeb Nebulizer order every 4 hours frequency and Albuterol Nebulizer every 2 hours PRN wheezing or increased work of breathing using Per Protocol order mode. Repeat RT Therapy Protocol Assessment with second treatment then every 4 hours and as needed. RT to enter RT Home Evaluation for COPD & MDI Assessment order using Per Protocol order mode.     Electronically signed by Katherin Shah RCP on 8/18/2021 at 12:45 PM

## 2021-08-18 NOTE — CONSULTS
Pulmonary Consult Note     Patient's name:  Tyson Lloyd 6896 Record Number: [id number]  Patient's account/billing number: [de-identified]  Patient's YOB: 1950  Age: 69 y.o. Date of Admission: 2021  5:17 PM  Date of Consult: 2021      Primary Care Physician: Narinder Leal MD      Code Status: Full Code    Reason for consult: acute hypoxic respiratory failure  covid PNA    Assessment and Plan     1. Acute hypoxic respiratory failure. 2.   COVID-19 ARDS with superimposed bacterial pneumonia. 3.   Obesity. 4.  Diabetes mellitus type 2.  5.  Hypertension. Plan:  Check C-reactive protein, ferritin  Remdesivir & Bracitinib   Broad spectrum antibiotic coverage for PNA, check MRSA if negative stop Vanc  Titrate O2 to keep sat > 90%  Encourage self proning  DVT prophylaxis bid  Steroid   Follow-up echocardiogram results. HISTORY OF PRESENT ILLNESS:   /Ms. Moe Celis is a 69 y.o.  lady with past medical history stated below significant for obesity, hypertension, diabetes mellitus type 2, was admitted to the hospital with worsening cough and shortness of breath, we were consulted for acute hypoxic respiratory failure, patient has been sick for 5 days, associated symptoms include malaise and lack of appetite. She tested positive for COVID-19. Her CT scan of the chest which I personally reviewed showed diffuse bilateral multifocal airspace disease. She is currently requiring 7 L/min high flow and progressively increasing oxygen requirement. Procalcitonin elevated. EKG with left elevation, echocardiogram results are pending.           Past Medical History:        Diagnosis Date    Anxiety     Arthritis     Chronic back pain     Depression     Diabetes 1.5, managed as type 2 ([address]     Hypertension     Obesity     Psoriasis        Past Surgical History:        Procedure Laterality Date     CREATININE 1.1 0.7   GLUCOSE 377*  --      S. Calcium:  Recent Labs     08/17/21  1757   CALCIUM 9.0     S. Ionized Calcium:No results for input(s): IONCA in the last 72 hours. S. Magnesium:No results for input(s): MG in the last 72 hours. S. Phosphorus:No results for input(s): PHOS in the last 72 hours. S. Glucose:  Recent Labs     08/17/21  2209 08/18/21  0750   POCGLU 396* 366*     Glycosylated hemoglobin A1C: No results for input(s): LABA1C in the last 72 hours. INR: No results for input(s): INR in the last 72 hours. Hepatic functions:   Recent Labs     08/17/21  1757   ALKPHOS 73   ALT 17   AST 23   PROT 6.5   BILITOT 0.3   LABALBU 3.5     Pancreatic functions:  Recent Labs     08/17/21  1757   LACTA 1.9     S. Lactic Acid:   Recent Labs     08/17/21  1757   LACTA 1.9     Cardiac enzymes:  Recent Labs     08/17/21  1757   TROPONINI <0.01     BNP:No results for input(s): BNP in the last 72 hours. Lipid profile: No results for input(s): CHOL, TRIG, HDL, LDLCALC in the last 72 hours. Invalid input(s): LDL  Blood Gases: No results found for: PH, PCO2, PO2, HCO3, O2SAT  Thyroid functions:   Lab Results   Component Value Date    TSH 2.01 06/07/2021          Radiology Review:  Pertinent images / reports were reviewed as a part of this visit. CT Chest w/ contrast: No results found for this or any previous visit. CT Chest w/o contrast: No results found for this or any previous visit. CTPA: No results found for this or any previous visit. CXR PA/LAT: Results for orders placed during the hospital encounter of 08/03/17    XR Chest Standard TWO VW    Narrative  EXAMINATION:  TWO VIEWS OF THE CHEST    8/3/2017 7:20 pm    COMPARISON:  None.     HISTORY:  ORDERING PHYSICIAN PROVIDED HISTORY: cough  TECHNOLOGIST PROVIDED HISTORY:  Technologist Provided Reason for Exam: cough, legs swollen  Acuity: Acute  Type of Encounter: Initial  Additional signs and symptoms: cough, legs swollen    History of hypertension, diabetes    FINDINGS:  No acute or focal bony abnormality. The heart size and mediastinal contours  are within normal limits. The lungs are clear. Impression  No acute disease. CXR portable: Results for orders placed during the hospital encounter of 08/17/21    XR CHEST PORTABLE    Narrative  EXAMINATION:  ONE XRAY VIEW OF THE CHEST    8/17/2021 5:46 pm    COMPARISON:  Chest radiograph performed 08/03/2017. HISTORY:  ORDERING SYSTEM PROVIDED HISTORY: SOB  TECHNOLOGIST PROVIDED HISTORY:  Reason for exam:->SOB  Reason for Exam: SOB, cough  Acuity: Acute  Type of Exam: Initial    FINDINGS:  There is left mid and bibasilar infiltrates. There is no effusion. There is  no pneumothorax. Mediastinal structures are unremarkable. The upper abdomen  unremarkable. The extrathoracic soft tissues are unremarkable. Impression  Left mid and bibasilar infiltrates consistent with pneumonia.                      Emerald Fisher MD, M.D.            8/18/2021, 11:06 AM

## 2021-08-18 NOTE — PROGRESS NOTES
Patient arrived to room 490 [id number] [phone] from ER department alert & oriented. Made a trip to the bathroom to void, then back to bed. Coughing,but vital signs stable & sinus rhythm on heart monitor. Waiting on covid test results. Oriented to room & call light.

## 2021-08-18 NOTE — PLAN OF CARE
Problem: Falls - Risk of:  Goal: Will remain free from falls  Description: Will remain free from falls  8/18/2021 1058 by Amna Lynn RN  Outcome: Ongoing  Note: Fall precautions in place. Bed locked and in lowest position. Alarm on. Call light within reach. 8/18/2021 0658 by Dominic Fair RN  Outcome: Ongoing  Goal: Absence of physical injury  Description: Absence of physical injury  8/18/2021 1058 by Amna Lynn RN  Outcome: Ongoing  8/18/2021 0658 by Dominic Fair RN  Outcome: Ongoing     Problem: Airway Clearance - Ineffective  Goal: Achieve or maintain patent airway  Outcome: Ongoing     Problem: Gas Exchange - Impaired  Goal: Absence of hypoxia  8/18/2021 1058 by Amna Lynn RN  Outcome: Ongoing  Note: O2 sats monitored. 8/18/2021 0658 by Dominic Fair RN  Outcome: Ongoing  Goal: Promote optimal lung function  Outcome: Ongoing     Problem: Breathing Pattern - Ineffective  Goal: Ability to achieve and maintain a regular respiratory rate  8/18/2021 1058 by Anma Lynn RN  Outcome: Ongoing  8/18/2021 0658 by Dominic Fair RN  Outcome: Ongoing     Problem:  Body Temperature -  Risk of, Imbalanced  Goal: Ability to maintain a body temperature within defined limits  Outcome: Ongoing  Goal: Will regain or maintain usual level of consciousness  Outcome: Ongoing  Goal: Complications related to the disease process, condition or treatment will be avoided or minimized  Outcome: Ongoing     Problem: Isolation Precautions - Risk of Spread of Infection  Goal: Prevent transmission of infection  8/18/2021 1058 by Amna Lynn RN  Outcome: Ongoing  8/18/2021 0658 by Dominic Fair RN  Outcome: Ongoing     Problem: Nutrition Deficits  Goal: Optimize nutritional status  Outcome: Ongoing     Problem: Risk for Fluid Volume Deficit  Goal: Maintain normal heart rhythm  Outcome: Ongoing  Goal: Maintain absence of muscle cramping  Outcome: Ongoing  Goal: Maintain normal serum potassium, sodium, calcium, phosphorus, and pH  Outcome: Ongoing     Problem: Loneliness or Risk for Loneliness  Goal: Demonstrate positive use of time alone when socialization is not possible  Outcome: Ongoing     Problem: Fatigue  Goal: Verbalize increase energy and improved vitality  Outcome: Ongoing     Problem: Patient Education: Go to Patient Education Activity  Goal: Patient/Family Education  Outcome: Ongoing     Problem: Pain:  Goal: Pain level will decrease  Description: Pain level will decrease  Outcome: Ongoing  Goal: Control of acute pain  Description: Control of acute pain  Outcome: Ongoing  Goal: Control of chronic pain  Description: Control of chronic pain  Outcome: Ongoing

## 2021-08-19 LAB
A/G RATIO: 1.1 (ref 1.1–2.2)
ALBUMIN SERPL-MCNC: 3.3 G/DL (ref 3.4–5)
ALP BLD-CCNC: 69 U/L (ref 40–129)
ALT SERPL-CCNC: 14 U/L (ref 10–40)
ANION GAP SERPL CALCULATED.3IONS-SCNC: 14 MMOL/L (ref 3–16)
AST SERPL-CCNC: 14 U/L (ref 15–37)
BASOPHILS ABSOLUTE: 0 K/UL (ref 0–0.2)
BASOPHILS RELATIVE PERCENT: 0.1 %
BILIRUB SERPL-MCNC: <0.2 MG/DL (ref 0–1)
BUN BLDV-MCNC: 36 MG/DL (ref 7–20)
C-REACTIVE PROTEIN: 134.5 MG/L (ref 0–5.1)
CALCIUM SERPL-MCNC: 9.2 MG/DL (ref 8.3–10.6)
CHLORIDE BLD-SCNC: 103 MMOL/L (ref 99–110)
CO2: 23 MMOL/L (ref 21–32)
CREAT SERPL-MCNC: 0.9 MG/DL (ref 0.6–1.2)
D DIMER: 542 NG/ML DDU (ref 0–229)
EOSINOPHILS ABSOLUTE: 0 K/UL (ref 0–0.6)
EOSINOPHILS RELATIVE PERCENT: 0 %
FERRITIN: 755 NG/ML (ref 15–150)
GFR AFRICAN AMERICAN: >60
GFR NON-AFRICAN AMERICAN: >60
GLOBULIN: 3.1 G/DL
GLUCOSE BLD-MCNC: 309 MG/DL (ref 70–99)
GLUCOSE BLD-MCNC: 312 MG/DL (ref 70–99)
GLUCOSE BLD-MCNC: 342 MG/DL (ref 70–99)
GLUCOSE BLD-MCNC: 355 MG/DL (ref 70–99)
GLUCOSE BLD-MCNC: 367 MG/DL (ref 70–99)
GLUCOSE BLD-MCNC: 380 MG/DL (ref 70–99)
HCT VFR BLD CALC: 37.1 % (ref 36–48)
HEMOGLOBIN: 12.3 G/DL (ref 12–16)
LYMPHOCYTES ABSOLUTE: 0.5 K/UL (ref 1–5.1)
LYMPHOCYTES RELATIVE PERCENT: 9 %
MCH RBC QN AUTO: 31.4 PG (ref 26–34)
MCHC RBC AUTO-ENTMCNC: 33.2 G/DL (ref 31–36)
MCV RBC AUTO: 94.7 FL (ref 80–100)
MONOCYTES ABSOLUTE: 0.4 K/UL (ref 0–1.3)
MONOCYTES RELATIVE PERCENT: 8.1 %
MRSA SCREEN RT-PCR: NORMAL
NEUTROPHILS ABSOLUTE: 4.3 K/UL (ref 1.7–7.7)
NEUTROPHILS RELATIVE PERCENT: 82.8 %
PDW BLD-RTO: 13.9 % (ref 12.4–15.4)
PERFORMED ON: ABNORMAL
PLATELET # BLD: 234 K/UL (ref 135–450)
PMV BLD AUTO: 8.6 FL (ref 5–10.5)
POTASSIUM REFLEX MAGNESIUM: 4.2 MMOL/L (ref 3.5–5.1)
RBC # BLD: 3.92 M/UL (ref 4–5.2)
SODIUM BLD-SCNC: 140 MMOL/L (ref 136–145)
TOTAL PROTEIN: 6.4 G/DL (ref 6.4–8.2)
WBC # BLD: 5.2 K/UL (ref 4–11)

## 2021-08-19 PROCEDURE — 6370000000 HC RX 637 (ALT 250 FOR IP): Performed by: INTERNAL MEDICINE

## 2021-08-19 PROCEDURE — 80053 COMPREHEN METABOLIC PANEL: CPT

## 2021-08-19 PROCEDURE — 82728 ASSAY OF FERRITIN: CPT

## 2021-08-19 PROCEDURE — 94640 AIRWAY INHALATION TREATMENT: CPT

## 2021-08-19 PROCEDURE — 99233 SBSQ HOSP IP/OBS HIGH 50: CPT | Performed by: INTERNAL MEDICINE

## 2021-08-19 PROCEDURE — 2700000000 HC OXYGEN THERAPY PER DAY

## 2021-08-19 PROCEDURE — 94761 N-INVAS EAR/PLS OXIMETRY MLT: CPT

## 2021-08-19 PROCEDURE — 2060000000 HC ICU INTERMEDIATE R&B

## 2021-08-19 PROCEDURE — 6360000002 HC RX W HCPCS: Performed by: INTERNAL MEDICINE

## 2021-08-19 PROCEDURE — 2500000003 HC RX 250 WO HCPCS: Performed by: INTERNAL MEDICINE

## 2021-08-19 PROCEDURE — 2580000003 HC RX 258: Performed by: INTERNAL MEDICINE

## 2021-08-19 PROCEDURE — 86140 C-REACTIVE PROTEIN: CPT

## 2021-08-19 PROCEDURE — 93005 ELECTROCARDIOGRAM TRACING: CPT | Performed by: INTERNAL MEDICINE

## 2021-08-19 PROCEDURE — 36415 COLL VENOUS BLD VENIPUNCTURE: CPT

## 2021-08-19 PROCEDURE — 85379 FIBRIN DEGRADATION QUANT: CPT

## 2021-08-19 PROCEDURE — 85025 COMPLETE CBC W/AUTO DIFF WBC: CPT

## 2021-08-19 RX ORDER — INSULIN GLARGINE 100 [IU]/ML
15 INJECTION, SOLUTION SUBCUTANEOUS NIGHTLY
Status: DISCONTINUED | OUTPATIENT
Start: 2021-08-19 | End: 2021-08-19

## 2021-08-19 RX ORDER — INSULIN GLARGINE 100 [IU]/ML
18 INJECTION, SOLUTION SUBCUTANEOUS NIGHTLY
Status: DISCONTINUED | OUTPATIENT
Start: 2021-08-19 | End: 2021-08-20

## 2021-08-19 RX ADMIN — METHYLPREDNISOLONE SODIUM SUCCINATE 40 MG: 40 INJECTION, POWDER, FOR SOLUTION INTRAMUSCULAR; INTRAVENOUS at 06:26

## 2021-08-19 RX ADMIN — NYSTATIN 500000 UNITS: 100000 SUSPENSION ORAL at 12:45

## 2021-08-19 RX ADMIN — INSULIN LISPRO 7 UNITS: 100 INJECTION, SOLUTION INTRAVENOUS; SUBCUTANEOUS at 21:11

## 2021-08-19 RX ADMIN — IPRATROPIUM BROMIDE AND ALBUTEROL 1 PUFF: 20; 100 SPRAY, METERED RESPIRATORY (INHALATION) at 20:10

## 2021-08-19 RX ADMIN — BARICITINIB 4 MG: 2 TABLET, FILM COATED ORAL at 07:50

## 2021-08-19 RX ADMIN — METOPROLOL TARTRATE 25 MG: 25 TABLET, FILM COATED ORAL at 13:13

## 2021-08-19 RX ADMIN — INSULIN LISPRO 5 UNITS: 100 INJECTION, SOLUTION INTRAVENOUS; SUBCUTANEOUS at 08:26

## 2021-08-19 RX ADMIN — NYSTATIN 500000 UNITS: 100000 SUSPENSION ORAL at 17:47

## 2021-08-19 RX ADMIN — IPRATROPIUM BROMIDE AND ALBUTEROL 1 PUFF: 20; 100 SPRAY, METERED RESPIRATORY (INHALATION) at 13:17

## 2021-08-19 RX ADMIN — LISINOPRIL AND HYDROCHLOROTHIAZIDE 1 TABLET: 12.5; 2 TABLET ORAL at 07:50

## 2021-08-19 RX ADMIN — LEVOFLOXACIN 500 MG: 5 INJECTION, SOLUTION INTRAVENOUS at 22:43

## 2021-08-19 RX ADMIN — APIXABAN 5 MG: 5 TABLET, FILM COATED ORAL at 21:11

## 2021-08-19 RX ADMIN — PANTOPRAZOLE SODIUM 40 MG: 40 TABLET, DELAYED RELEASE ORAL at 06:26

## 2021-08-19 RX ADMIN — INSULIN LISPRO 8 UNITS: 100 INJECTION, SOLUTION INTRAVENOUS; SUBCUTANEOUS at 17:49

## 2021-08-19 RX ADMIN — IPRATROPIUM BROMIDE AND ALBUTEROL 1 PUFF: 20; 100 SPRAY, METERED RESPIRATORY (INHALATION) at 09:50

## 2021-08-19 RX ADMIN — INSULIN LISPRO 15 UNITS: 100 INJECTION, SOLUTION INTRAVENOUS; SUBCUTANEOUS at 17:49

## 2021-08-19 RX ADMIN — NYSTATIN 500000 UNITS: 100000 SUSPENSION ORAL at 21:11

## 2021-08-19 RX ADMIN — NYSTATIN 500000 UNITS: 100000 SUSPENSION ORAL at 07:49

## 2021-08-19 RX ADMIN — IPRATROPIUM BROMIDE AND ALBUTEROL 1 PUFF: 20; 100 SPRAY, METERED RESPIRATORY (INHALATION) at 17:00

## 2021-08-19 RX ADMIN — ACETAMINOPHEN 650 MG: 325 TABLET ORAL at 02:10

## 2021-08-19 RX ADMIN — INSULIN LISPRO 5 UNITS: 100 INJECTION, SOLUTION INTRAVENOUS; SUBCUTANEOUS at 12:52

## 2021-08-19 RX ADMIN — ENOXAPARIN SODIUM 40 MG: 40 INJECTION SUBCUTANEOUS at 07:50

## 2021-08-19 RX ADMIN — REMDESIVIR 100 MG: 5 INJECTION INTRAVENOUS at 12:44

## 2021-08-19 RX ADMIN — INSULIN LISPRO 15 UNITS: 100 INJECTION, SOLUTION INTRAVENOUS; SUBCUTANEOUS at 12:51

## 2021-08-19 RX ADMIN — ALLOPURINOL 300 MG: 300 TABLET ORAL at 07:50

## 2021-08-19 RX ADMIN — METHYLPREDNISOLONE SODIUM SUCCINATE 40 MG: 40 INJECTION, POWDER, FOR SOLUTION INTRAMUSCULAR; INTRAVENOUS at 17:47

## 2021-08-19 RX ADMIN — METOPROLOL TARTRATE 25 MG: 25 TABLET, FILM COATED ORAL at 21:11

## 2021-08-19 RX ADMIN — INSULIN GLARGINE 18 UNITS: 100 INJECTION, SOLUTION SUBCUTANEOUS at 21:12

## 2021-08-19 RX ADMIN — INSULIN LISPRO 12 UNITS: 100 INJECTION, SOLUTION INTRAVENOUS; SUBCUTANEOUS at 08:26

## 2021-08-19 ASSESSMENT — PAIN SCALES - WONG BAKER
WONGBAKER_NUMERICALRESPONSE: 0

## 2021-08-19 ASSESSMENT — PAIN SCALES - GENERAL
PAINLEVEL_OUTOF10: 0
PAINLEVEL_OUTOF10: 0
PAINLEVEL_OUTOF10: 3
PAINLEVEL_OUTOF10: 0
PAINLEVEL_OUTOF10: 0

## 2021-08-19 NOTE — PLAN OF CARE
Problem: Falls - Risk of:  Goal: Will remain free from falls  Description: Will remain free from falls  8/18/2021 2152 by Reagan Matthew RN  Outcome: Ongoing  8/18/2021 1058 by Lola Murillo RN  Outcome: Ongoing  Note: Fall precautions in place. Bed locked and in lowest position. Alarm on. Call light within reach. Goal: Absence of physical injury  Description: Absence of physical injury  8/18/2021 2152 by Reagan Matthew RN  Outcome: Ongoing  8/18/2021 1058 by Lola Murillo RN  Outcome: Ongoing     Problem: Airway Clearance - Ineffective  Goal: Achieve or maintain patent airway  8/18/2021 2152 by Reagan Matthew RN  Outcome: Ongoing  8/18/2021 1058 by Lola Murillo RN  Outcome: Ongoing     Problem: Gas Exchange - Impaired  Goal: Absence of hypoxia  8/18/2021 2152 by Reagan Matthew RN  Outcome: Ongoing  8/18/2021 1058 by Lola Murillo RN  Outcome: Ongoing  Note: O2 sats monitored. Goal: Promote optimal lung function  8/18/2021 2152 by Reagan Matthew RN  Outcome: Ongoing  8/18/2021 1058 by Lola Murillo RN  Outcome: Ongoing     Problem: Breathing Pattern - Ineffective  Goal: Ability to achieve and maintain a regular respiratory rate  8/18/2021 2152 by Reagan Matthew RN  Outcome: Ongoing  8/18/2021 1058 by Lola Murillo RN  Outcome: Ongoing     Problem:  Body Temperature -  Risk of, Imbalanced  Goal: Ability to maintain a body temperature within defined limits  8/18/2021 2152 by Reagan Matthew RN  Outcome: Ongoing  8/18/2021 1058 by Lola Murillo RN  Outcome: Ongoing  Goal: Will regain or maintain usual level of consciousness  8/18/2021 2152 by Reagan Matthew RN  Outcome: Ongoing  8/18/2021 1058 by Lola Murillo RN  Outcome: Ongoing  Goal: Complications related to the disease process, condition or treatment will be avoided or minimized  8/18/2021 2152 by Reagan Matthew RN  Outcome: Ongoing  8/18/2021 1058 by Lola Murillo RN  Outcome: Ongoing     Problem: Isolation Precautions - Risk of Spread of Infection  Goal: Prevent transmission of infection  8/18/2021 2152 by Susan Mckeon RN  Outcome: Ongoing  8/18/2021 1058 by Elliot Cabrera RN  Outcome: Ongoing     Problem: Nutrition Deficits  Goal: Optimize nutritional status  8/18/2021 2152 by Susan Mckeon RN  Outcome: Ongoing  8/18/2021 1058 by Elliot Cabrera RN  Outcome: Ongoing     Problem: Risk for Fluid Volume Deficit  Goal: Maintain normal heart rhythm  8/18/2021 2152 by Susan Mckeon RN  Outcome: Ongoing  8/18/2021 1058 by Elliot Cabrera RN  Outcome: Ongoing  Goal: Maintain absence of muscle cramping  8/18/2021 2152 by Susan Mckeon RN  Outcome: Ongoing  8/18/2021 1058 by Elliot Cabrera RN  Outcome: Ongoing  Goal: Maintain normal serum potassium, sodium, calcium, phosphorus, and pH  8/18/2021 2152 by Susan Mckeon RN  Outcome: Ongoing  8/18/2021 1058 by Elliot Cabrera RN  Outcome: Ongoing     Problem: Loneliness or Risk for Loneliness  Goal: Demonstrate positive use of time alone when socialization is not possible  8/18/2021 2152 by Susan Mckeon RN  Outcome: Ongoing  8/18/2021 1058 by Elliot Cabrera RN  Outcome: Ongoing     Problem: Fatigue  Goal: Verbalize increase energy and improved vitality  8/18/2021 2152 by Susan Mckeon RN  Outcome: Ongoing  8/18/2021 1058 by Elliot Cabrera RN  Outcome: Ongoing     Problem: Patient Education: Go to Patient Education Activity  Goal: Patient/Family Education  8/18/2021 2152 by Susan Mckeon RN  Outcome: Ongoing  8/18/2021 1058 by Elliot Cabrera RN  Outcome: Ongoing     Problem: Pain:  Goal: Pain level will decrease  Description: Pain level will decrease  8/18/2021 2152 by Susan Mckeon RN  Outcome: Ongoing  8/18/2021 1058 by Elliot Cabrera RN  Outcome: Ongoing  Goal: Control of acute pain  Description: Control of acute pain  8/18/2021 2152 by Susan Mckeon RN  Outcome: Ongoing  8/18/2021 1058 by Elliot Cabrera RN  Outcome: Ongoing  Goal: Control of chronic pain  Description: Control of chronic pain  8/18/2021 2152 by Susan Mckeon RN  Outcome: Ongoing  8/18/2021 1058 by Ranulfo Rivera RN  Outcome: Ongoing

## 2021-08-19 NOTE — PROGRESS NOTES
Received a call from [facility] stating pt has been in A-FIB since 00:03, pt does not have A-FIB RVR (-140) as one of her DX. LMOVM for Dr. Yvette Kaye with information and recommendations. Dr. Yvette Kaye called back to say because pt has COVID-19 the [facility] should address her change in cardiac status. This RN sent a message to Davin Liriano NP @ 02:59.     Electronically signed by Georgia Lee RN on 8/19/2021 at 1:14 AM

## 2021-08-19 NOTE — CONSULTS
The NP's (Malik Zhao, HARVINDER NP) documentation (to come 8/19/2021) will be prepared under my direction and personally reviewed by me in its entirety. I confirm that the consultation note created by the NP accurately reflects all work, physical examination, the discussion of treatments and procedures, and medical decision making by the NP. In addition, I have personally met with; performed a physical examination on; discussed the diagnosis (-es), treatment options including procedures, and formulated medical decisions for this patient. In brief, Debi Coleman 69 y.o. female h/o DM, HTN, obesity presents with dyspnea, cough, fever and is diagnosed with COVID-19. During hospitalization, she is noted to have a new onset atrial fibrillation with v-rates 150-160 bpm starting 8/19/2021 @ 00:04 through now. Started on Metoprolol 25mg po BID overnight, and v-rates now 70-80's bpm. Given [id number] score of 4 (age, gender, HTN, DM), will start DOAC - Eliquis 5mg po BID. As long as rate well controlled, can be discharged home and follow-up in my clinic to discuss treatment options for atrial fibrillation, including ablation. Please refer to the NP's consult note for full details on the assessment and plan (to be completed 8/20/2021). Thank you for allowing us to participate in the care of your patient. If you have any questions, please do not hesitate to contact us.      Francesca Melendez MD, MS, [facility]  Cardiac Electrophysiology  [address]  [address] [facility], [address] [place] [address] [facility] [address]  [phone]

## 2021-08-19 NOTE — PROGRESS NOTES
Pt O2 is decreasing, running between 82-87. Turned her O2 up to 10L.     Electronically signed by Denise Lopez RN on 8/19/2021 at 6:36 AM

## 2021-08-19 NOTE — PROGRESS NOTES
Pts heart rate has been jumping into the 130s-140s. Pt flipped into afib overnight. MD was made aware then. New message sent to MD for PRN orders and potential consult to cardiology.  Will continue to monitor and will follow up with MD.     Electronically signed by Lou Nugent RN on 8/19/2021 at 11:00 AM

## 2021-08-19 NOTE — PROGRESS NOTES
Hospitalist Progress Note      PCP: Shaila Dodson MD    Date of Admission: 8/17/2021    Chief Complaint: shortness of breath, cough, fatigue    Hospital Course: The patient is a 69 y.o. female with hx HTN, obesity, HTN, anxiety/depression who presents to [facility] with shortness of breath, cough, and fatigue. Her symptoms began about 5 days ago and have been constant. Appetite has been poor and patient states she has no energy. Denies fever, chills, chest pain, abdominal pain, nausea, vomiting, constipation, diarrhea, and dysuria.     In the ED, labs were significant for a sodium of 128, chloride of 91, pro-BNP of 1,769, glucose of 377. She tested positive for COVID-19. CXR showed left mid and bibasilar infiltrates consistent with pneumonia. CT Chest without contrast showed moderate multifocal opacity throughout all lobes bilaterally, trace pleural effusions, mild mediastinal adenopathy, bilateral adrenal adenomas, and mild mural thickening of the distal esophagus. Subjective: Pt seen and examined. She feels lousy. Still short of breath, fatigued, no energy.        Medications:  Reviewed    Infusion Medications    dextrose       Scheduled Medications    metoprolol tartrate  25 mg Oral BID    insulin lispro  8 Units Subcutaneous TID WC    insulin glargine  18 Units Subcutaneous Nightly    apixaban  5 mg Oral BID    albuterol-ipratropium  1 puff Inhalation Q4H WA    methylPREDNISolone sodium  40 mg Intravenous Q12H    nystatin  5 mL Oral 4x Daily    remdesivir IVPB  100 mg Intravenous Q24H    insulin lispro  0-18 Units Subcutaneous TID WC    insulin lispro  0-9 Units Subcutaneous Nightly    baricitinib  4 mg Oral Daily    allopurinol  300 mg Oral Daily    lisinopril-hydroCHLOROthiazide  1 tablet Oral Daily    levofloxacin  500 mg Intravenous Q24H    pantoprazole  40 mg Oral QAM AC     PRN Meds: sodium chloride, glucose, dextrose, glucagon (rDNA), dextrose, perflutren lipid microspheres, acetaminophen, ALPRAZolam, HYDROcodone-acetaminophen      Intake/Output Summary (Last 24 hours) at 8/19/2021 1612  Last data filed at 8/19/2021 1349  Gross per 24 hour   Intake 667.25 ml   Output 1650 ml   Net -982.75 ml       Exam:    /69   Pulse 88   Temp 97.4 °F (36.3 °C) (Oral)   Resp 18   Ht 5' 5\" (1.651 m)   Wt 237 lb 14 oz (107.9 kg)   SpO2 95%   BMI 39.58 kg/m²     General appearance: Ill appearing. HEENT Thrush apparent. Normal cephalic, atraumatic without obvious deformity. Pupils equal, round, and reactive to light. Extra ocular muscles intact. Conjunctivae/corneas clear. Neck: Supple, No jugular venous distention/bruits. Trachea midline without thyromegaly or adenopathy with full range of motion. Lungs: Increased work of breathing, accessory muscle use, lungs clear to auscultation. Heart: Regular rate and rhythm with Normal S1/S2 without murmurs, rubs or gallops, point of maximum impulse non-displaced  Abdomen: Obese, soft, non-tender or non-distended without rigidity or guarding and positive bowel sounds all four quadrants. Extremities: No clubbing, cyanosis, or edema bilaterally. Full range of motion without deformity and normal gait intact. Skin: Skin color, texture, turgor normal.  No rashes or lesions. Neurologic: Alert and oriented X 3, neurovascularly intact with sensory/motor intact upper extremities/lower extremities, bilaterally. Cranial nerves: II-XII intact, grossly non-focal.  Mental status: Alert, oriented, thought content appropriate.   Capillary Refill: Acceptable  < 3 seconds  Peripheral Pulses: +3 Easily felt, not easily obliterated with pressure      Labs:   Recent Labs     08/17/21  1757 08/19/21  0556   WBC 4.8 5.2   HGB 12.1 12.3   HCT 36.5 37.1    234     Recent Labs     08/17/21  1757 08/18/21  0529 08/19/21  0556   * 135* 140   K 4.2 4.9 4.2   CL 91* 97* 103   CO2 23 17* 23   BUN 17 17 36*   CREATININE 1.1 1.0  0.7 0.9 CALCIUM 9.0 9.3 9.2     Recent Labs     08/17/21  1757 08/18/21  0529 08/19/21  0556   AST 23 24 14*   ALT 17 18 14   BILITOT 0.3 <0.2 <0.2   ALKPHOS 73 77 69     No results for input(s): INR in the last 72 hours. Recent Labs     08/17/21  1757   TROPONINI <0.01       Urinalysis:      Lab Results   Component Value Date    NITRU Negative 08/17/2021    WBCUA 6 08/17/2021    RBCUA 2 08/17/2021    BLOODU Negative 08/17/2021    SPECGRAV 1.014 08/17/2021    GLUCOSEU >=1000 08/17/2021    GLUCOSEU >=1000 06/04/2010       Radiology:  CT CHEST WO CONTRAST   Final Result   Moderate multifocal opacity throughout all lobes bilaterally, consistent with   viral pneumonitis in this patient with history of COVID-19 infection. Trace pleural effusions. Mild mediastinal adenopathy, likely reactive. Bilateral adrenal adenomas. Mild mural thickening of the distal esophagus; correlate for esophagitis. XR CHEST PORTABLE   Final Result   Left mid and bibasilar infiltrates consistent with pneumonia.                  Assessment/Plan:    Active Hospital Problems    Diagnosis Date Noted    Acute respiratory failure ([facility] [address] [J96.00] 08/17/2021       Pneumonia due to COVID-19 infection  -IV Solumedrol  -Remdesivir x 10 days  -baricitnib x 14 days  -vitamin D supplementation  -empiric IV Vanc and Levaquin to cover for possible superimposed bacterial infection  -MRSA swab  -PPE  -supportive care  -contact plus isolation  -Lovenox BID-> Eliquis with Afib overnight  -Combivent QID  -trend inflammatory markers     Remdesivir Initiation Note     This patient meets criteria for initiation of remdesivir based on the following:  · Proven COVID-19  · Moderate disease (Requiring supplemental O2)  · Acceptable hepatic function (ALT within 5 times ULN)     Exclusion Criteria:  · Severe disease requiring invasive or non-invasive mechanical ventilation (includes HFNC & BiPAP)  · Could consider use in patients requiring high flow if

## 2021-08-19 NOTE — PLAN OF CARE
Problem: Falls - Risk of:  Goal: Will remain free from falls  Description: Will remain free from falls  8/19/2021 1051 by María Jackman RN  Outcome: Ongoing     Problem: Airway Clearance - Ineffective  Goal: Achieve or maintain patent airway  8/19/2021 1051 by María Jackman RN  Outcome: Ongoing     Problem: Gas Exchange - Impaired  Goal: Promote optimal lung function  8/19/2021 1051 by María Jackman RN  Outcome: Ongoing     Problem:  Body Temperature -  Risk of, Imbalanced  Goal: Will regain or maintain usual level of consciousness  8/19/2021 1051 by María Jackman RN  Outcome: Ongoing     Problem: Risk for Fluid Volume Deficit  Goal: Maintain normal heart rhythm  8/19/2021 1051 by María Jackman RN  Outcome: Ongoing     Problem: Fatigue  Goal: Verbalize increase energy and improved vitality  8/19/2021 1051 by María Jackman RN  Outcome: Ongoing     Problem: Pain:  Goal: Control of chronic pain  Description: Control of chronic pain  8/19/2021 1051 by María Jackman RN  Outcome: Ongoing

## 2021-08-19 NOTE — PROGRESS NOTES
Pulmonary Progress Note     Patient's name:  Tyson Lloyd 6896 Record Number: [id number]  Patient's account/billing number: [de-identified]  Patient's YOB: 1950  Age: 69 y.o. Date of Admission: 8/17/2021  5:17 PM  Date of Consult: 8/19/2021      Primary Care Physician: Irina Talley MD      Code Status: Full Code    Reason for consult: acute hypoxic respiratory failure  covid PNA    Assessment and Plan     1. Acute hypoxic respiratory failure. 2.   COVID-19 ARDS with superimposed bacterial pneumonia. 3.   Obesity. 4.  Diabetes mellitus type 2.  5.  Hypertension. Plan:  Remdesivir & Bracitinib   Broad spectrum antibiotic coverage for PNA, check MRSA if negative stop Vanc  Titrate O2 to keep sat > 90%  Encourage self proning  DVT prophylaxis bid  Steroid   Follow-up echocardiogram results. Subjective:  C/o back pain  O2 requirement up to 9 L       REVIEW OF SYSTEMS:  Review of Systems -   General ROS: Fatigue. Psychological ROS: negative  Ophthalmic ROS: negative  ENT ROS: Nasal congestion postnasal drainage. Allergy and Immunology ROS: negative  Hematological and Lymphatic ROS: negative  Endocrine ROS: negative  Breast ROS: negative  Respiratory ROS: Cough, shortness of breath. Cardiovascular ROS: no chest pain or dyspnea on exertion  Gastrointestinal ROS:negative  Genito-Urinary ROS: negative  Musculoskeletal ROS: back pain   Neurological ROS: negative  Dermatological ROS: negative        Physical Exam:    Vitals: /68   Pulse 87   Temp 97.8 °F (36.6 °C) (Oral)   Resp 16   Ht 5' 5\" (1.651 m)   Wt 237 lb 14 oz (107.9 kg)   SpO2 91%   BMI 39.58 kg/m²     Last Body weight:   Wt Readings from Last 3 Encounters:   08/19/21 237 lb 14 oz (107.9 kg)   08/13/21 244 lb (110.7 kg)   04/29/21 259 lb (117.5 kg)       Body Mass Index : Body mass index is 39.58 kg/m².       Intake and Output summary:     Intake/Output Summary (Last 24 hours) at 8/19/2021 1413  Last data filed at 8/19/2021 1349  Gross per 24 hour   Intake 1147.44 ml   Output 1650 ml   Net -502.56 ml       Physical Examination:     PHYSICAL EXAM:    Gen: Obese,  Mild to moderate acute distress. Eyes: PERRL. Anicteric sclera. No conjunctival injection. ENT: No discharge. Posterior oropharynx clear. External appearance of ears and nose normal.  Neck: Trachea midline. No mass   Resp: Bilateral coarse rales, no wheezing mild increased work of breathing,  CV: Regular rate. Regular rhythm. No murmur or rub. No edema. GI: Soft, Non-tender. Non-distended. +BS  Skin: Warm, dry, w/o erythema. Lymph: No cervical or supraclavicular LAD. M/S: No cyanosis. No clubbing. Neuro:  CN 2-12 tested, no focal neurologic deficit. Moves all extremities  Psych: Awake and alert, Oriented x 3. Judgement and insight appropriate. Mood stable. Laboratory findings:-    CBC:   Recent Labs     08/19/21  0556   WBC 5.2   HGB 12.3        BMP:    Recent Labs     08/17/21  1757 08/17/21  1757 08/18/21  0529 08/18/21  0529 08/19/21  0556   *   < > 135*   < > 140   K 4.2   < > 4.9   < > 4.2   CL 91*   < > 97*   < > 103   CO2 23   < > 17*   < > 23   BUN 17   < > 17   < > 36*   CREATININE 1.1  --  1.0  0.7  --  0.9   GLUCOSE 377*   < > 448*   < > 309*    < > = values in this interval not displayed. S. Calcium:  Recent Labs     08/19/21  0556   CALCIUM 9.2     S. Ionized Calcium:No results for input(s): IONCA in the last 72 hours. S. Magnesium:No results for input(s): MG in the last 72 hours. S. Phosphorus:No results for input(s): PHOS in the last 72 hours. S. Glucose:  Recent Labs     08/19/21  0214 08/19/21  0801 08/19/21  1219   POCGLU 342* 312* 355*     Glycosylated hemoglobin A1C: No results for input(s): LABA1C in the last 72 hours. INR: No results for input(s): INR in the last 72 hours.   Hepatic functions:   Recent Labs     08/19/21  0556   ALKPHOS 69   ALT 14 AST 14*   PROT 6.4   BILITOT <0.2   LABALBU 3.3*     Pancreatic functions:  Recent Labs     08/17/21 1757   LACTA 1.9     S. Lactic Acid:   Recent Labs     08/17/21 1757   LACTA 1.9     Cardiac enzymes:  Recent Labs     08/17/21 1757   TROPONINI <0.01     BNP:No results for input(s): BNP in the last 72 hours. Lipid profile: No results for input(s): CHOL, TRIG, HDL, LDLCALC in the last 72 hours. Invalid input(s): LDL  Blood Gases: No results found for: PH, PCO2, PO2, HCO3, O2SAT  Thyroid functions:   Lab Results   Component Value Date    TSH 2.01 06/07/2021          Radiology Review:  Pertinent images / reports were reviewed as a part of this visit. CT Chest w/ contrast: No results found for this or any previous visit. CT Chest w/o contrast: No results found for this or any previous visit. CTPA: No results found for this or any previous visit. CXR PA/LAT: Results for orders placed during the hospital encounter of 08/03/17    XR Chest Standard TWO VW    Narrative  EXAMINATION:  TWO VIEWS OF THE CHEST    8/3/2017 7:20 pm    COMPARISON:  None. HISTORY:  ORDERING PHYSICIAN PROVIDED HISTORY: cough  TECHNOLOGIST PROVIDED HISTORY:  Technologist Provided Reason for Exam: cough, legs swollen  Acuity: Acute  Type of Encounter: Initial  Additional signs and symptoms: cough, legs swollen    History of hypertension, diabetes    FINDINGS:  No acute or focal bony abnormality. The heart size and mediastinal contours  are within normal limits. The lungs are clear. Impression  No acute disease. CXR portable: Results for orders placed during the hospital encounter of 08/17/21    XR CHEST PORTABLE    Narrative  EXAMINATION:  ONE XRAY VIEW OF THE CHEST    8/17/2021 5:46 pm    COMPARISON:  Chest radiograph performed 08/03/2017.     HISTORY:  ORDERING SYSTEM PROVIDED HISTORY: SOB  TECHNOLOGIST PROVIDED HISTORY:  Reason for exam:->SOB  Reason for Exam: SOB, cough  Acuity: Acute  Type of Exam: Initial    FINDINGS:  There is left mid and bibasilar infiltrates. There is no effusion. There is  no pneumothorax. Mediastinal structures are unremarkable. The upper abdomen  unremarkable. The extrathoracic soft tissues are unremarkable. Impression  Left mid and bibasilar infiltrates consistent with pneumonia.                      Richi Macedo MD, MSHAHAB.            8/19/2021, 2:13 PM

## 2021-08-20 LAB
A/G RATIO: 1 (ref 1.1–2.2)
ALBUMIN SERPL-MCNC: 3.2 G/DL (ref 3.4–5)
ALP BLD-CCNC: 67 U/L (ref 40–129)
ALT SERPL-CCNC: 14 U/L (ref 10–40)
ANION GAP SERPL CALCULATED.3IONS-SCNC: 13 MMOL/L (ref 3–16)
AST SERPL-CCNC: 13 U/L (ref 15–37)
BASOPHILS ABSOLUTE: 0 K/UL (ref 0–0.2)
BASOPHILS RELATIVE PERCENT: 0.1 %
BILIRUB SERPL-MCNC: <0.2 MG/DL (ref 0–1)
BUN BLDV-MCNC: 42 MG/DL (ref 7–20)
C-REACTIVE PROTEIN: 76 MG/L (ref 0–5.1)
CALCIUM SERPL-MCNC: 9.6 MG/DL (ref 8.3–10.6)
CHLORIDE BLD-SCNC: 101 MMOL/L (ref 99–110)
CO2: 25 MMOL/L (ref 21–32)
CREAT SERPL-MCNC: 1 MG/DL (ref 0.6–1.2)
D DIMER: 451 NG/ML DDU (ref 0–229)
EKG ATRIAL RATE: 90 BPM
EKG ATRIAL RATE: 92 BPM
EKG DIAGNOSIS: NORMAL
EKG DIAGNOSIS: NORMAL
EKG P AXIS: 73 DEGREES
EKG P-R INTERVAL: 140 MS
EKG Q-T INTERVAL: 378 MS
EKG Q-T INTERVAL: 386 MS
EKG QRS DURATION: 128 MS
EKG QRS DURATION: 130 MS
EKG QTC CALCULATION (BAZETT): 477 MS
EKG QTC CALCULATION (BAZETT): 495 MS
EKG R AXIS: -42 DEGREES
EKG R AXIS: -53 DEGREES
EKG T AXIS: 58 DEGREES
EKG T AXIS: 79 DEGREES
EKG VENTRICULAR RATE: 103 BPM
EKG VENTRICULAR RATE: 92 BPM
EOSINOPHILS ABSOLUTE: 0 K/UL (ref 0–0.6)
EOSINOPHILS RELATIVE PERCENT: 0 %
FERRITIN: 827.9 NG/ML (ref 15–150)
GFR AFRICAN AMERICAN: >60
GFR NON-AFRICAN AMERICAN: 55
GLOBULIN: 3.1 G/DL
GLUCOSE BLD-MCNC: 264 MG/DL (ref 70–99)
GLUCOSE BLD-MCNC: 270 MG/DL (ref 70–99)
GLUCOSE BLD-MCNC: 437 MG/DL (ref 70–99)
GLUCOSE BLD-MCNC: 442 MG/DL (ref 70–99)
GLUCOSE BLD-MCNC: 446 MG/DL (ref 70–99)
GLUCOSE BLD-MCNC: 509 MG/DL (ref 70–99)
HCT VFR BLD CALC: 38.8 % (ref 36–48)
HEMOGLOBIN: 12.8 G/DL (ref 12–16)
LYMPHOCYTES ABSOLUTE: 0.6 K/UL (ref 1–5.1)
LYMPHOCYTES RELATIVE PERCENT: 9.1 %
MCH RBC QN AUTO: 30.9 PG (ref 26–34)
MCHC RBC AUTO-ENTMCNC: 33 G/DL (ref 31–36)
MCV RBC AUTO: 93.4 FL (ref 80–100)
MONOCYTES ABSOLUTE: 0.6 K/UL (ref 0–1.3)
MONOCYTES RELATIVE PERCENT: 8.7 %
NEUTROPHILS ABSOLUTE: 5.4 K/UL (ref 1.7–7.7)
NEUTROPHILS RELATIVE PERCENT: 82.1 %
PDW BLD-RTO: 13.5 % (ref 12.4–15.4)
PERFORMED ON: ABNORMAL
PLATELET # BLD: 283 K/UL (ref 135–450)
PMV BLD AUTO: 8.5 FL (ref 5–10.5)
POTASSIUM REFLEX MAGNESIUM: 4.6 MMOL/L (ref 3.5–5.1)
PROCALCITONIN: 0.18 NG/ML (ref 0–0.15)
RBC # BLD: 4.16 M/UL (ref 4–5.2)
SODIUM BLD-SCNC: 139 MMOL/L (ref 136–145)
TOTAL PROTEIN: 6.3 G/DL (ref 6.4–8.2)
WBC # BLD: 6.5 K/UL (ref 4–11)

## 2021-08-20 PROCEDURE — 2700000000 HC OXYGEN THERAPY PER DAY

## 2021-08-20 PROCEDURE — 99233 SBSQ HOSP IP/OBS HIGH 50: CPT | Performed by: INTERNAL MEDICINE

## 2021-08-20 PROCEDURE — 99222 1ST HOSP IP/OBS MODERATE 55: CPT | Performed by: NURSE PRACTITIONER

## 2021-08-20 PROCEDURE — 6360000002 HC RX W HCPCS: Performed by: INTERNAL MEDICINE

## 2021-08-20 PROCEDURE — 85025 COMPLETE CBC W/AUTO DIFF WBC: CPT

## 2021-08-20 PROCEDURE — 2060000000 HC ICU INTERMEDIATE R&B

## 2021-08-20 PROCEDURE — 80053 COMPREHEN METABOLIC PANEL: CPT

## 2021-08-20 PROCEDURE — 86140 C-REACTIVE PROTEIN: CPT

## 2021-08-20 PROCEDURE — 94761 N-INVAS EAR/PLS OXIMETRY MLT: CPT

## 2021-08-20 PROCEDURE — 36415 COLL VENOUS BLD VENIPUNCTURE: CPT

## 2021-08-20 PROCEDURE — 6370000000 HC RX 637 (ALT 250 FOR IP): Performed by: INTERNAL MEDICINE

## 2021-08-20 PROCEDURE — 2580000003 HC RX 258: Performed by: INTERNAL MEDICINE

## 2021-08-20 PROCEDURE — 85379 FIBRIN DEGRADATION QUANT: CPT

## 2021-08-20 PROCEDURE — 84145 PROCALCITONIN (PCT): CPT

## 2021-08-20 PROCEDURE — 2500000003 HC RX 250 WO HCPCS: Performed by: INTERNAL MEDICINE

## 2021-08-20 PROCEDURE — 82728 ASSAY OF FERRITIN: CPT

## 2021-08-20 PROCEDURE — 93010 ELECTROCARDIOGRAM REPORT: CPT | Performed by: INTERNAL MEDICINE

## 2021-08-20 PROCEDURE — 94640 AIRWAY INHALATION TREATMENT: CPT

## 2021-08-20 RX ORDER — INSULIN GLARGINE 100 [IU]/ML
21 INJECTION, SOLUTION SUBCUTANEOUS NIGHTLY
Status: DISCONTINUED | OUTPATIENT
Start: 2021-08-20 | End: 2021-08-22

## 2021-08-20 RX ADMIN — SODIUM CHLORIDE 30 ML: 9 INJECTION, SOLUTION INTRAVENOUS at 23:15

## 2021-08-20 RX ADMIN — METHYLPREDNISOLONE SODIUM SUCCINATE 40 MG: 40 INJECTION, POWDER, FOR SOLUTION INTRAMUSCULAR; INTRAVENOUS at 18:15

## 2021-08-20 RX ADMIN — INSULIN LISPRO 9 UNITS: 100 INJECTION, SOLUTION INTRAVENOUS; SUBCUTANEOUS at 23:17

## 2021-08-20 RX ADMIN — NYSTATIN 500000 UNITS: 100000 SUSPENSION ORAL at 08:33

## 2021-08-20 RX ADMIN — PANTOPRAZOLE SODIUM 40 MG: 40 TABLET, DELAYED RELEASE ORAL at 06:36

## 2021-08-20 RX ADMIN — REMDESIVIR 100 MG: 5 INJECTION INTRAVENOUS at 13:06

## 2021-08-20 RX ADMIN — INSULIN LISPRO 8 UNITS: 100 INJECTION, SOLUTION INTRAVENOUS; SUBCUTANEOUS at 18:18

## 2021-08-20 RX ADMIN — INSULIN LISPRO 8 UNITS: 100 INJECTION, SOLUTION INTRAVENOUS; SUBCUTANEOUS at 08:37

## 2021-08-20 RX ADMIN — APIXABAN 5 MG: 5 TABLET, FILM COATED ORAL at 22:59

## 2021-08-20 RX ADMIN — BARICITINIB 4 MG: 2 TABLET, FILM COATED ORAL at 08:33

## 2021-08-20 RX ADMIN — ALLOPURINOL 300 MG: 300 TABLET ORAL at 08:33

## 2021-08-20 RX ADMIN — IPRATROPIUM BROMIDE AND ALBUTEROL 1 PUFF: 20; 100 SPRAY, METERED RESPIRATORY (INHALATION) at 16:16

## 2021-08-20 RX ADMIN — METOPROLOL TARTRATE 25 MG: 25 TABLET, FILM COATED ORAL at 22:59

## 2021-08-20 RX ADMIN — NYSTATIN 500000 UNITS: 100000 SUSPENSION ORAL at 18:15

## 2021-08-20 RX ADMIN — INSULIN LISPRO 18 UNITS: 100 INJECTION, SOLUTION INTRAVENOUS; SUBCUTANEOUS at 12:00

## 2021-08-20 RX ADMIN — NYSTATIN 500000 UNITS: 100000 SUSPENSION ORAL at 12:00

## 2021-08-20 RX ADMIN — METOPROLOL TARTRATE 25 MG: 25 TABLET, FILM COATED ORAL at 08:33

## 2021-08-20 RX ADMIN — NYSTATIN 500000 UNITS: 100000 SUSPENSION ORAL at 22:59

## 2021-08-20 RX ADMIN — INSULIN LISPRO 18 UNITS: 100 INJECTION, SOLUTION INTRAVENOUS; SUBCUTANEOUS at 18:19

## 2021-08-20 RX ADMIN — LEVOFLOXACIN 500 MG: 5 INJECTION, SOLUTION INTRAVENOUS at 23:16

## 2021-08-20 RX ADMIN — INSULIN LISPRO 9 UNITS: 100 INJECTION, SOLUTION INTRAVENOUS; SUBCUTANEOUS at 08:37

## 2021-08-20 RX ADMIN — INSULIN LISPRO 8 UNITS: 100 INJECTION, SOLUTION INTRAVENOUS; SUBCUTANEOUS at 12:00

## 2021-08-20 RX ADMIN — INSULIN HUMAN 10 UNITS: 100 INJECTION, SOLUTION PARENTERAL at 18:16

## 2021-08-20 RX ADMIN — IPRATROPIUM BROMIDE AND ALBUTEROL 1 PUFF: 20; 100 SPRAY, METERED RESPIRATORY (INHALATION) at 20:28

## 2021-08-20 RX ADMIN — IPRATROPIUM BROMIDE AND ALBUTEROL 1 PUFF: 20; 100 SPRAY, METERED RESPIRATORY (INHALATION) at 12:17

## 2021-08-20 RX ADMIN — LISINOPRIL AND HYDROCHLOROTHIAZIDE 1 TABLET: 12.5; 2 TABLET ORAL at 08:33

## 2021-08-20 RX ADMIN — IPRATROPIUM BROMIDE AND ALBUTEROL 1 PUFF: 20; 100 SPRAY, METERED RESPIRATORY (INHALATION) at 08:45

## 2021-08-20 RX ADMIN — INSULIN GLARGINE 21 UNITS: 100 INJECTION, SOLUTION SUBCUTANEOUS at 23:17

## 2021-08-20 RX ADMIN — METHYLPREDNISOLONE SODIUM SUCCINATE 40 MG: 40 INJECTION, POWDER, FOR SOLUTION INTRAMUSCULAR; INTRAVENOUS at 06:36

## 2021-08-20 RX ADMIN — APIXABAN 5 MG: 5 TABLET, FILM COATED ORAL at 08:33

## 2021-08-20 ASSESSMENT — PAIN SCALES - GENERAL
PAINLEVEL_OUTOF10: 0

## 2021-08-20 NOTE — ACP (ADVANCE CARE PLANNING)
Advance Care Planning     Advance Care Planning Activator (Inpatient)  Conversation Note      Date of ACP Conversation: 8/20/2021     Conversation Conducted with: Patient with Decision Making Capacity    ACP Activator: Jessica Hawkins RN    Health Care Decision Maker:     Current Designated Health Care Decision Maker:     Primary Decision Maker: Gloriarusselwarren Marks Child - [phone]    Primary Decision Maker: Compa Toussaint - Child - [phone]    Care Preferences    Ventilation: \"If you were in your present state of health and suddenly became very ill and were unable to breathe on your own, what would your preference be about the use of a ventilator (breathing machine) if it were available to you? \"      Would the patient desire the use of ventilator (breathing machine)?: yes    \"If your health worsens and it becomes clear that your chance of recovery is unlikely, what would your preference be about the use of a ventilator (breathing machine) if it were available to you? \"     Would the patient desire the use of ventilator (breathing machine)?: Unsure      Resuscitation  \"CPR works best to restart the heart when there is a sudden event, like a heart attack, in someone who is otherwise healthy. Unfortunately, CPR does not typically restart the heart for people who have serious health conditions or who are very sick. \"    \"In the event your heart stopped as a result of an underlying serious health condition, would you want attempts to be made to restart your heart (answer \"yes\" for attempt to resuscitate) or would you prefer a natural death (answer \"no\" for do not attempt to resuscitate)? \" yes       [] Yes   [x] No   Educated Patient / Piotr Moran regarding differences between Advance Directives and portable DNR orders.     Length of ACP Conversation in minutes:  5 minutes    Conversation Outcomes:  [x] ACP discussion completed  [] Existing advance directive reviewed with patient; no changes to patient's previously recorded wishes  [] New Advance Directive completed  [] Portable Do Not Rescitate prepared for Provider review and signature  [] POLST/POST/MOLST/MOST prepared for Provider review and signature      Follow-up plan:    [] Schedule follow-up conversation to continue planning  [] Referred individual to Provider for additional questions/concerns   [] Advised patient/agent/surrogate to review completed ACP document and update if needed with changes in condition, patient preferences or care setting    [x] This note routed to one or more involved healthcare providers  Electronically signed by Najma Cortes RN Case Management [phone] on 8/20/2021 at 10:38 AM

## 2021-08-20 NOTE — PROGRESS NOTES
Received a call from [facility] stating patient's oxygen has been low (no higher than 87-88%) for a while. This RN along with the RT went to the patients room and repositioned her, checked tubing and replaced the finger pulse ox to the patients ring finger. Pt oxygen is now at 95% on 14L, at the beginning of my shift she was on 12L. Pt is also a mouth breather, she has to be reminded to close mouth and breathe out of her nose.     Electronically signed by Tima Gaviria RN on 8/20/2021 at 4:58 AM

## 2021-08-20 NOTE — PLAN OF CARE
Problem: Falls - Risk of:  Goal: Will remain free from falls  Description: Will remain free from falls  8/19/2021 2143 by Theresa Dotson RN  Outcome: Ongoing  8/19/2021 1051 by Elly Stevenson RN  Outcome: Ongoing  Goal: Absence of physical injury  Description: Absence of physical injury  8/19/2021 2143 by Theresa Dotson RN  Outcome: Ongoing  8/19/2021 1051 by Elly Stevenson RN  Outcome: Ongoing     Problem: Airway Clearance - Ineffective  Goal: Achieve or maintain patent airway  8/19/2021 2143 by Theresa Dotson RN  Outcome: Ongoing  8/19/2021 1051 by Elly Stevenson RN  Outcome: Ongoing     Problem: Gas Exchange - Impaired  Goal: Absence of hypoxia  8/19/2021 2143 by Theresa Dotson RN  Outcome: Ongoing  8/19/2021 1051 by Elly Stevenson RN  Outcome: Ongoing  Goal: Promote optimal lung function  8/19/2021 2143 by Theresa Dotson RN  Outcome: Ongoing  8/19/2021 1051 by Elly Stevenson RN  Outcome: Ongoing     Problem: Breathing Pattern - Ineffective  Goal: Ability to achieve and maintain a regular respiratory rate  8/19/2021 2143 by Theresa Dotson RN  Outcome: Ongoing  8/19/2021 1051 by Elly Stevenson RN  Outcome: Ongoing     Problem:  Body Temperature -  Risk of, Imbalanced  Goal: Ability to maintain a body temperature within defined limits  8/19/2021 2143 by Theresa Dotson RN  Outcome: Ongoing  8/19/2021 1051 by Elly Stevenson RN  Outcome: Ongoing  Goal: Will regain or maintain usual level of consciousness  8/19/2021 2143 by Theresa Dotson RN  Outcome: Ongoing  8/19/2021 1051 by Elly Stevenson RN  Outcome: Ongoing  Goal: Complications related to the disease process, condition or treatment will be avoided or minimized  8/19/2021 2143 by Theresa Dotson RN  Outcome: Ongoing  8/19/2021 1051 by Elly Stevenson RN  Outcome: Ongoing     Problem: Isolation Precautions - Risk of Spread of Infection  Goal: Prevent transmission of infection  8/19/2021 2143 by Theresa Dotson RN  Outcome: Ongoing  8/19/2021/2021 1051 by Elly Stevenson, RN  Outcome: Ongoing Problem: Nutrition Deficits  Goal: Optimize nutritional status  8/19/2021 2143 by Charbel Santana RN  Outcome: Ongoing  8/19/2021 1051 by Melanie Goel RN  Outcome: Ongoing     Problem: Risk for Fluid Volume Deficit  Goal: Maintain normal heart rhythm  8/19/2021 2143 by Charbel Santana RN  Outcome: Ongoing  8/19/2021 1051 by Melanie Goel RN  Outcome: Ongoing  Goal: Maintain absence of muscle cramping  8/19/2021 2143 by Charbel Santana RN  Outcome: Ongoing  8/19/2021 1051 by Melanie Goel RN  Outcome: Ongoing  Goal: Maintain normal serum potassium, sodium, calcium, phosphorus, and pH  8/19/2021 2143 by Charbel Santana RN  Outcome: Ongoing  8/19/2021 1051 by Melanie Goel RN  Outcome: Ongoing     Problem: Loneliness or Risk for Loneliness  Goal: Demonstrate positive use of time alone when socialization is not possible  8/19/2021 2143 by Charbel Santana RN  Outcome: Ongoing  8/19/2021 1051 by Melanie Goel RN  Outcome: Ongoing     Problem: Fatigue  Goal: Verbalize increase energy and improved vitality  8/19/2021 2143 by Charbel Santana RN  Outcome: Ongoing  8/19/2021 1051 by Melanie Goel RN  Outcome: Ongoing     Problem: Patient Education: Go to Patient Education Activity  Goal: Patient/Family Education  8/19/2021 2143 by Charbel Santana RN  Outcome: Ongoing  8/19/2021 1051 by Melanie Goel RN  Outcome: Ongoing     Problem: Pain:  Goal: Pain level will decrease  Description: Pain level will decrease  8/19/2021 2143 by Charbel Santana RN  Outcome: Ongoing  8/19/2021 1051 by Melanie Goel RN  Outcome: Ongoing  Goal: Control of acute pain  Description: Control of acute pain  8/19/2021 2143 by Charbel Santana RN  Outcome: Ongoing  8/19/2021 1051 by Melanie Goel RN  Outcome: Ongoing  Goal: Control of chronic pain  Description: Control of chronic pain  8/19/2021 2143 by Charbel Santana RN  Outcome: Ongoing  8/19/2021 1051 by Melanie Goel RN  Outcome: Ongoing

## 2021-08-20 NOTE — PROGRESS NOTES
mass index is 40.1 kg/m². Intake and Output summary:     Intake/Output Summary (Last 24 hours) at 8/20/2021 1241  Last data filed at 8/20/2021 1024  Gross per 24 hour   Intake 1588.79 ml   Output 650 ml   Net 938.79 ml       Physical Examination:     PHYSICAL EXAM:    Gen: Obese,  Mild to moderate acute distress. Eyes: PERRL. Anicteric sclera. No conjunctival injection. ENT: No discharge. Posterior oropharynx clear. External appearance of ears and nose normal.  Neck: Trachea midline. No mass   Resp: Bilateral coarse rales, no wheezing mild increased work of breathing,  CV: Regular rate. Regular rhythm. No murmur or rub. No edema. GI: Soft, Non-tender. Non-distended. +BS  Skin: Warm, dry, w/o erythema. Lymph: No cervical or supraclavicular LAD. M/S: No cyanosis. No clubbing. Neuro:  CN 2-12 tested, no focal neurologic deficit. Moves all extremities  Psych: Awake and alert, Oriented x 3. Judgement and insight appropriate. Mood stable. Laboratory findings:-    CBC:   Recent Labs     08/20/21  0548   WBC 6.5   HGB 12.8        BMP:    Recent Labs     08/18/21  0529 08/18/21  0529 08/19/21  0556 08/19/21  0556 08/20/21  0548   *   < > 140   < > 139   K 4.9   < > 4.2   < > 4.6   CL 97*   < > 103   < > 101   CO2 17*   < > 23   < > 25   BUN 17   < > 36*   < > 42*   CREATININE 1.0  0.7  --  0.9  --  1.0   GLUCOSE 448*   < > 309*   < > 264*    < > = values in this interval not displayed. S. Calcium:  Recent Labs     08/20/21  0548   CALCIUM 9.6     S. Ionized Calcium:No results for input(s): IONCA in the last 72 hours. S. Magnesium:No results for input(s): MG in the last 72 hours. S. Phosphorus:No results for input(s): PHOS in the last 72 hours. S. Glucose:  Recent Labs     08/19/21 2038 08/20/21  0802 08/20/21  1144   POCGLU 380* 270* 442*     Glycosylated hemoglobin A1C: No results for input(s): LABA1C in the last 72 hours.   INR: No results for input(s): INR in the last 72 hours. Hepatic functions:   Recent Labs     08/20/21  0548   ALKPHOS 67   ALT 14   AST 13*   PROT 6.3*   BILITOT <0.2   LABALBU 3.2*     Pancreatic functions:  Recent Labs     08/17/21  1757   LACTA 1.9     S. Lactic Acid:   Recent Labs     08/17/21  1757   LACTA 1.9     Cardiac enzymes:  Recent Labs     08/17/21  1757   TROPONINI <0.01     BNP:No results for input(s): BNP in the last 72 hours. Lipid profile: No results for input(s): CHOL, TRIG, HDL, LDLCALC in the last 72 hours. Invalid input(s): LDL  Blood Gases: No results found for: PH, PCO2, PO2, HCO3, O2SAT  Thyroid functions:   Lab Results   Component Value Date    TSH 2.01 06/07/2021          Radiology Review:  Pertinent images / reports were reviewed as a part of this visit. CT Chest w/ contrast: No results found for this or any previous visit. CT Chest w/o contrast: No results found for this or any previous visit. CTPA: No results found for this or any previous visit. CXR PA/LAT: Results for orders placed during the hospital encounter of 08/03/17    XR Chest Standard TWO VW    Narrative  EXAMINATION:  TWO VIEWS OF THE CHEST    8/3/2017 7:20 pm    COMPARISON:  None. HISTORY:  ORDERING PHYSICIAN PROVIDED HISTORY: cough  TECHNOLOGIST PROVIDED HISTORY:  Technologist Provided Reason for Exam: cough, legs swollen  Acuity: Acute  Type of Encounter: Initial  Additional signs and symptoms: cough, legs swollen    History of hypertension, diabetes    FINDINGS:  No acute or focal bony abnormality. The heart size and mediastinal contours  are within normal limits. The lungs are clear. Impression  No acute disease. CXR portable: Results for orders placed during the hospital encounter of 08/17/21    XR CHEST PORTABLE    Narrative  EXAMINATION:  ONE XRAY VIEW OF THE CHEST    8/17/2021 5:46 pm    COMPARISON:  Chest radiograph performed 08/03/2017.     HISTORY:  ORDERING SYSTEM PROVIDED HISTORY: SOB  TECHNOLOGIST PROVIDED HISTORY:  Reason for exam:->SOB  Reason for Exam: SOB, cough  Acuity: Acute  Type of Exam: Initial    FINDINGS:  There is left mid and bibasilar infiltrates. There is no effusion. There is  no pneumothorax. Mediastinal structures are unremarkable. The upper abdomen  unremarkable. The extrathoracic soft tissues are unremarkable. Impression  Left mid and bibasilar infiltrates consistent with pneumonia.                      Fara Guillaume MD, M.MONSERRAT.            8/20/2021, 12:41 PM

## 2021-08-20 NOTE — PROGRESS NOTES
Pts blood sugars have been increasing over the course of the day. The most recent being 509. New message to MD sent and new orders placed. See MAR. Will continue to monitor.      Electronically signed by Melanie Goel RN on 8/20/2021 at 5:46 PM

## 2021-08-20 NOTE — CARE COORDINATION
Attempted to reach patient in room multiple times, no answer. Also lm on patient's phone # listed, no return call. Reached out to RN to have patient call me from room next time Rn in room, RN to assist with having patient call CM.   Electronically signed by Belia Montiel RN Case Management [phone] on 08/19/2021 at 10:12 AM

## 2021-08-20 NOTE — CONSULTS
Cardiac Electrophysiology Consultation     Date: 2021  Admit Date:  2021  Admission Diagnosis: Acute respiratory failure (HCC) [J96.00]  Hyperglycemia [R73.9]  Acute respiratory failure with hypoxemia ([facility] [J96.01]  Community acquired pneumonia of left lower lobe of lung [J18.9]     Reason for Consultation: A fib  Consult Requesting Physician: Emiliano Mcbride MD       History of Present Illness  Silvia Trinidad is a 69y.o. year old female with past medical history significant for HTN, DM, depression and anxiety who presented to the ED for 5 days of SOB, cough and fatigue. She was found to be Covid positive with pneumonia. She was noted to have new atrial fibrillation with RVR, v-rates 150s-160s on 21. She was started on metoprolol 25mg BID and v-rates improved to 70s-80s. She continues to have short episodes of rates >110 but is rate controlled majority of the time. Continues to need HFNC at 15L.        Past Medical History:   Diagnosis Date    Anxiety     Arthritis     Chronic back pain     Depression     Diabetes 1.5, managed as type 2 ([facility]     Hypertension     Obesity     Psoriasis         Past Surgical History:   Procedure Laterality Date     SECTION      LEG SURGERY Right        Current Outpatient Medications   Medication Instructions    ALPRAZolam (XANAX) 0.5 MG tablet TAKE ONE TABLET BY MOUTH THREE TIMES A DAY AS NEEDED FOR ANXIETY OR SLEEP    Coal Tar Extract 2 % OINT Apply bid    furosemide (LASIX) 40 mg, Oral, 2 TIMES DAILY    HYDROcodone-acetaminophen (NORCO) 5-325 MG per tablet 1 tablet, Oral, EVERY 8 HOURS PRN    levoFLOXacin (LEVAQUIN) 500 mg, Oral, DAILY    lisinopril-hydroCHLOROthiazide (PRINZIDE;ZESTORETIC) 20-12.5 MG per tablet TAKE ONE TABLET BY MOUTH DAILY    metFORMIN (GLUCOPHAGE) 1000 MG tablet TAKE ONE TABLET BY MOUTH TWICE A DAY WITH MEALS    nystatin (MYCOSTATIN) 797207 UNIT/ML suspension TAKE FIVE MILLILITERS BY MOUTH FOUR TIMES A DAY FOR 10 DAYS (RETAIN IN MOUTH AS LONG AS POSSIBLE)    predniSONE (DELTASONE) 20 MG tablet 2 tab po daily for 4 days then 1 tab po daily for 3 days        Allergies   Allergen Reactions    Penicillins      Pt unsure of reaction       Social History:   reports that she has never smoked. She has never used smokeless tobacco. She reports that she does not drink alcohol and does not use drugs. Family History:  family history includes High Blood Pressure in her father and mother. Review of Systems:  · General: positive for fatigue, negative for fever, chills   · Ophthalmic ROS: negative for eye pain or loss of vision  · ENT ROS: negative for headaches, sore throat, nasal drainage  · Respiratory: positive for cough, SOB, no sputum. · Cardiovascular: negative for chest pain, palpitations.   · Gastrointestinal: negative for abdominal pain, diarrhea, N/V  · Hematology: negative for bleeding, blood clots, bruising or jaundice  · Genito-Urinary:  negative for dysuria or incontinence  · Musculoskeletal: negative for joint swelling, muscle pain  · Neurological: negative for confusion, dizziness, headaches   · Psychiatric: negative anxiety, depression  · Dermatological: negative for rash    Medications:  Scheduled Meds:   metoprolol tartrate  25 mg Oral BID    insulin lispro  8 Units Subcutaneous TID WC    insulin glargine  18 Units Subcutaneous Nightly    apixaban  5 mg Oral BID    albuterol-ipratropium  1 puff Inhalation Q4H WA    methylPREDNISolone sodium  40 mg Intravenous Q12H    nystatin  5 mL Oral 4x Daily    remdesivir IVPB  100 mg Intravenous Q24H    insulin lispro  0-18 Units Subcutaneous TID WC    insulin lispro  0-9 Units Subcutaneous Nightly    baricitinib  4 mg Oral Daily    allopurinol  300 mg Oral Daily    lisinopril-hydroCHLOROthiazide  1 tablet Oral Daily    levofloxacin  500 mg Intravenous Q24H    pantoprazole  40 mg Oral QAM AC      Continuous Infusions:   dextrose       PRN Meds:.sodium chloride, glucose, dextrose, glucagon (rDNA), dextrose, perflutren lipid microspheres, acetaminophen, ALPRAZolam, HYDROcodone-acetaminophen     Physical Examination:  Vitals:    21 0723   BP: (!) 145/68   Pulse: 75   Resp: 20   Temp: 97.5 °F (36.4 °C)   SpO2: 91%        Intake/Output Summary (Last 24 hours) at 2021 0829  Last data filed at 2021 2125  Gross per 24 hour   Intake 1648.79 ml   Output 950 ml   Net 698.79 ml     In: 1228.8 [P.O.:975]  Out: 400    Wt Readings from Last 3 Encounters:   21 240 lb 15.4 oz (109.3 kg)   21 244 lb (110.7 kg)   21 259 lb (117.5 kg)     Temp  Av.7 °F (36.5 °C)  Min: 97.4 °F (36.3 °C)  Max: 98.3 °F (36.8 °C)  Pulse  Av.2  Min: 75  Max: 88  BP  Min: 104/68  Max: 163/96  SpO2  Av.8 %  Min: 88 %  Max: 95 %    · Telemetry: Atrial fibrillation v-rates 90s. · Constitutional: Alert, appears acutely ill. Appears stated age. · Head: Normocephalic and atraumatic. Physical exam limited due to positive Covid status. Labs:  Reviewed. Recent Labs     21  0529 21  0556 21  0548   * 140 139   K 4.9 4.2 4.6   CL 97* 103 101   CO2 17* 23 25   BUN 17 36* 42*   CREATININE 1.0  0.7 0.9 1.0     Recent Labs     21  1757 21  0556 21  0548   WBC 4.8 5.2 6.5   HGB 12.1 12.3 12.8   HCT 36.5 37.1 38.8   MCV 94.2 94.7 93.4    234 283     Lab Results   Component Value Date    TROPONINI <0.01 2021     No results found for: BNP  Lab Results   Component Value Date    PROTIME 10.3 2010    INR 0.94 2010     Lab Results   Component Value Date    CHOL 182 2021    HDL 63 2021    TRIG 180 2021       Diagnostic and imaging results reviewed. EC21  Atrial fibrillation at 103 BPM. LAD. Echo: 21   Left ventricular cavity size is normal.   Normal left ventricular wall thickness. Ejection fraction is visually estimated to be 50-55%.    No regional wall motion abnormalities are noted. Indeterminate diastolic function. Normal right ventricular size and function. Right ventricular systolic function is normal.   Mild tricuspid regurgitation. Assessment & Plan:    New atrial fibrillation with RVR   - no past diagnosis of this, likely precipitated by Covid pneumonia, respiratory failure   - rates are well controlled aside from a couple faster rates overnight   - continue metoprolol 25mg BID   - CHADS2-VASc 4 (age, gender, HTN, DM) on Eliquis 5mg BID   - if does not convert on own, can consider outpatient cardioversion once respiratory issues are resolved, can also consider ablation down the road    Covid pneumonia   - care per pulmonary    Acute hypoxic respiratory failure   - secondary to above   - continues on 15L HFNC   - care per pulmonary    Discussed with Dr. Kate Ko. EP issues are stable, will sign off and arrange follow up. Please call with concerns.     ELENO Jean Baptiste  [facility], [address] [facility]  Phone: [phone]  Fax: [phone]    Electronically signed by ELENO De La Rosa - CNP on 8/20/2021 at 8:29 AM

## 2021-08-20 NOTE — PROGRESS NOTES
Hospitalist Progress Note      PCP: Jailene Khan MD    Date of Admission: 8/17/2021    Chief Complaint: shortness of breath, cough, fatigue    Hospital Course: The patient is a 69 y.o. female with hx HTN, obesity, HTN, anxiety/depression who presents to [facility] with shortness of breath, cough, and fatigue. Her symptoms began about 5 days ago and have been constant. Appetite has been poor and patient states she has no energy. Denies fever, chills, chest pain, abdominal pain, nausea, vomiting, constipation, diarrhea, and dysuria.     In the ED, labs were significant for a sodium of 128, chloride of 91, pro-BNP of 1,769, glucose of 377. She tested positive for COVID-19. CXR showed left mid and bibasilar infiltrates consistent with pneumonia. CT Chest without contrast showed moderate multifocal opacity throughout all lobes bilaterally, trace pleural effusions, mild mediastinal adenopathy, bilateral adrenal adenomas, and mild mural thickening of the distal esophagus. Subjective: Pt seen and examined. She feels a little better today. More energy. Has an appetite. Oxygen at 15L today.       Medications:  Reviewed    Infusion Medications    dextrose       Scheduled Medications    insulin glargine  21 Units Subcutaneous Nightly    metoprolol tartrate  25 mg Oral BID    insulin lispro  8 Units Subcutaneous TID WC    apixaban  5 mg Oral BID    albuterol-ipratropium  1 puff Inhalation Q4H WA    methylPREDNISolone sodium  40 mg Intravenous Q12H    nystatin  5 mL Oral 4x Daily    remdesivir IVPB  100 mg Intravenous Q24H    insulin lispro  0-18 Units Subcutaneous TID WC    insulin lispro  0-9 Units Subcutaneous Nightly    baricitinib  4 mg Oral Daily    allopurinol  300 mg Oral Daily    lisinopril-hydroCHLOROthiazide  1 tablet Oral Daily    levofloxacin  500 mg Intravenous Q24H    pantoprazole  40 mg Oral QAM AC     PRN Meds: sodium chloride, glucose, dextrose, glucagon (rDNA), dextrose, perflutren lipid microspheres, acetaminophen, ALPRAZolam, HYDROcodone-acetaminophen      Intake/Output Summary (Last 24 hours) at 8/20/2021 1944  Last data filed at 8/20/2021 1024  Gross per 24 hour   Intake 1095 ml   Output 400 ml   Net 695 ml       Exam:    /78   Pulse 86   Temp 97.6 °F (36.4 °C) (Oral)   Resp 18   Ht 5' 5\" (1.651 m)   Wt 240 lb 15.4 oz (109.3 kg)   SpO2 94%   BMI 40.10 kg/m²     General appearance: Ill appearing. HEENT Thrush apparent. Normal cephalic, atraumatic without obvious deformity. Pupils equal, round, and reactive to light. Extra ocular muscles intact. Conjunctivae/corneas clear. Neck: Supple, No jugular venous distention/bruits. Trachea midline without thyromegaly or adenopathy with full range of motion. Lungs: Increased work of breathing, accessory muscle use, lungs clear to auscultation. Heart: Regular rate and rhythm with Normal S1/S2 without murmurs, rubs or gallops, point of maximum impulse non-displaced  Abdomen: Obese, soft, non-tender or non-distended without rigidity or guarding and positive bowel sounds all four quadrants. Extremities: No clubbing, cyanosis, or edema bilaterally. Full range of motion without deformity and normal gait intact. Skin: Skin color, texture, turgor normal.  No rashes or lesions. Neurologic: Alert and oriented X 3, neurovascularly intact with sensory/motor intact upper extremities/lower extremities, bilaterally. Cranial nerves: II-XII intact, grossly non-focal.  Mental status: Alert, oriented, thought content appropriate.   Capillary Refill: Acceptable  < 3 seconds  Peripheral Pulses: +3 Easily felt, not easily obliterated with pressure      Labs:   Recent Labs     08/19/21  0556 08/20/21  0548   WBC 5.2 6.5   HGB 12.3 12.8   HCT 37.1 38.8    283     Recent Labs     08/18/21  0529 08/19/21  0556 08/20/21  0548   * 140 139   K 4.9 4.2 4.6   CL 97* 103 101   CO2 17* 23 25   BUN 17 36* 42*   CREATININE 1.0  0.7 0.9 requiring high flow if early on in the disease course (based on symptom duration)  · Use of more than 1 vasopressor prior to remdesivir initiation  · Already improving on supportive treatment and/or impending discharge  · Patients in whom the clinical team think death is in the immediate short-term where remdesivir is unlikely to change the clinical outcome     Liver function tests will be monitored daily while on remdesivir. Atrial fibrillation with rapid ventricular response  -cardiology consulted, recs appreciated  -PO Lopressor 25 mg BID  -PO Eliquis 5 mg BID  -tele monitoring     Acute respiratory failure with hypoxia - due to COVID-19 infection  -management as above  -wean oxygen to maintain SpO2 > 89%     Type 2 DM - uncontrolled  -Lantus adjusted  -Humalog adjusted  -SSI  -hypoglycemia protocol  -POCT glucose checks     Anxiety and depression  -continue home meds     Essential hypertension  -continue home meds     Obesity - BMI 39.71  -nutritional counseling provided  -weight loss encouraged  -complicating medical management      DVT Prophylaxis: Eliquis  Diet: ADULT DIET;  Regular; 4 carb choices (60 gm/meal)  Adult Oral Nutrition Supplement; Standard High Calorie/High Protein Oral Supplement  Code Status: Full Code    PT/OT Eval Status: defer    Dispo - continue care    Argelia Archibald MD

## 2021-08-20 NOTE — CARE COORDINATION
INITIAL CASE MANAGEMENT ASSESSMENT    Unable to meet with patient due to isolation status. Call to patient's room, spoke with patient over the phone to assess possible discharge needs. Explained Case Management role/services. Living Situation: Confirmed address, lives alone in 3 level house, 6-7 steps into home, 16 steps to 2nd floor where patient mainly stays    ADLs: Independent     DME: Shower chair, BSC, cane (doesn't use)    PT/OT Recs: Not ordered, may need eval & recs     Active Services: None     Transportation: Active      Medications: Uses [employer] [place] -- no issues    PCP: Shaila Dodson MD      HD/PD: n/a    PLAN/COMMENTS: Patient will return home at discharge. Patient oxygen demands have been increasing, monitor for home O2 needs or new DC plan. May need PT/OT eval.    CM provided contact information for patient or family to call with any questions. CM will follow and assist as needed.   Electronically signed by Chris Kirby RN Case Management [phone] on 8/20/2021 at 10:33 AM

## 2021-08-21 LAB
A/G RATIO: 0.9 (ref 1.1–2.2)
ALBUMIN SERPL-MCNC: 3 G/DL (ref 3.4–5)
ALP BLD-CCNC: 73 U/L (ref 40–129)
ALT SERPL-CCNC: 13 U/L (ref 10–40)
ANION GAP SERPL CALCULATED.3IONS-SCNC: 15 MMOL/L (ref 3–16)
AST SERPL-CCNC: 15 U/L (ref 15–37)
BASOPHILS ABSOLUTE: 0 K/UL (ref 0–0.2)
BASOPHILS RELATIVE PERCENT: 0.2 %
BILIRUB SERPL-MCNC: 0.3 MG/DL (ref 0–1)
BLOOD CULTURE, ROUTINE: NORMAL
BUN BLDV-MCNC: 45 MG/DL (ref 7–20)
C-REACTIVE PROTEIN: 40.9 MG/L (ref 0–5.1)
CALCIUM SERPL-MCNC: 9.9 MG/DL (ref 8.3–10.6)
CHLORIDE BLD-SCNC: 99 MMOL/L (ref 99–110)
CO2: 23 MMOL/L (ref 21–32)
CREAT SERPL-MCNC: 1 MG/DL (ref 0.6–1.2)
CULTURE, BLOOD 2: NORMAL
D DIMER: 400 NG/ML DDU (ref 0–229)
EOSINOPHILS ABSOLUTE: 0 K/UL (ref 0–0.6)
EOSINOPHILS RELATIVE PERCENT: 0 %
FERRITIN: 882.5 NG/ML (ref 15–150)
GFR AFRICAN AMERICAN: >60
GFR NON-AFRICAN AMERICAN: 55
GLOBULIN: 3.5 G/DL
GLUCOSE BLD-MCNC: 181 MG/DL (ref 70–99)
GLUCOSE BLD-MCNC: 240 MG/DL (ref 70–99)
GLUCOSE BLD-MCNC: 374 MG/DL (ref 70–99)
GLUCOSE BLD-MCNC: 401 MG/DL (ref 70–99)
GLUCOSE BLD-MCNC: 472 MG/DL (ref 70–99)
HCT VFR BLD CALC: 43 % (ref 36–48)
HEMOGLOBIN: 14 G/DL (ref 12–16)
LYMPHOCYTES ABSOLUTE: 0.6 K/UL (ref 1–5.1)
LYMPHOCYTES RELATIVE PERCENT: 7.5 %
MCH RBC QN AUTO: 30.8 PG (ref 26–34)
MCHC RBC AUTO-ENTMCNC: 32.6 G/DL (ref 31–36)
MCV RBC AUTO: 94.5 FL (ref 80–100)
MONOCYTES ABSOLUTE: 0.6 K/UL (ref 0–1.3)
MONOCYTES RELATIVE PERCENT: 7.5 %
NEUTROPHILS ABSOLUTE: 6.9 K/UL (ref 1.7–7.7)
NEUTROPHILS RELATIVE PERCENT: 84.8 %
PDW BLD-RTO: 13.7 % (ref 12.4–15.4)
PERFORMED ON: ABNORMAL
PLATELET # BLD: 327 K/UL (ref 135–450)
PMV BLD AUTO: 8.3 FL (ref 5–10.5)
POTASSIUM REFLEX MAGNESIUM: 4.9 MMOL/L (ref 3.5–5.1)
RBC # BLD: 4.55 M/UL (ref 4–5.2)
SODIUM BLD-SCNC: 137 MMOL/L (ref 136–145)
TOTAL PROTEIN: 6.5 G/DL (ref 6.4–8.2)
WBC # BLD: 8.2 K/UL (ref 4–11)

## 2021-08-21 PROCEDURE — 94761 N-INVAS EAR/PLS OXIMETRY MLT: CPT

## 2021-08-21 PROCEDURE — 6370000000 HC RX 637 (ALT 250 FOR IP): Performed by: INTERNAL MEDICINE

## 2021-08-21 PROCEDURE — 2500000003 HC RX 250 WO HCPCS: Performed by: INTERNAL MEDICINE

## 2021-08-21 PROCEDURE — 94669 MECHANICAL CHEST WALL OSCILL: CPT

## 2021-08-21 PROCEDURE — 2580000003 HC RX 258: Performed by: INTERNAL MEDICINE

## 2021-08-21 PROCEDURE — 85379 FIBRIN DEGRADATION QUANT: CPT

## 2021-08-21 PROCEDURE — 36415 COLL VENOUS BLD VENIPUNCTURE: CPT

## 2021-08-21 PROCEDURE — 85025 COMPLETE CBC W/AUTO DIFF WBC: CPT

## 2021-08-21 PROCEDURE — 99232 SBSQ HOSP IP/OBS MODERATE 35: CPT | Performed by: NURSE PRACTITIONER

## 2021-08-21 PROCEDURE — 94150 VITAL CAPACITY TEST: CPT

## 2021-08-21 PROCEDURE — 6360000002 HC RX W HCPCS: Performed by: INTERNAL MEDICINE

## 2021-08-21 PROCEDURE — 2060000000 HC ICU INTERMEDIATE R&B

## 2021-08-21 PROCEDURE — 80053 COMPREHEN METABOLIC PANEL: CPT

## 2021-08-21 PROCEDURE — 2700000000 HC OXYGEN THERAPY PER DAY

## 2021-08-21 PROCEDURE — 86140 C-REACTIVE PROTEIN: CPT

## 2021-08-21 PROCEDURE — 94640 AIRWAY INHALATION TREATMENT: CPT

## 2021-08-21 PROCEDURE — 82728 ASSAY OF FERRITIN: CPT

## 2021-08-21 RX ORDER — LEVOFLOXACIN 5 MG/ML
500 INJECTION, SOLUTION INTRAVENOUS EVERY 24 HOURS
Status: COMPLETED | OUTPATIENT
Start: 2021-08-21 | End: 2021-08-24

## 2021-08-21 RX ADMIN — INSULIN LISPRO 8 UNITS: 100 INJECTION, SOLUTION INTRAVENOUS; SUBCUTANEOUS at 08:46

## 2021-08-21 RX ADMIN — REMDESIVIR 100 MG: 5 INJECTION INTRAVENOUS at 13:38

## 2021-08-21 RX ADMIN — NYSTATIN 500000 UNITS: 100000 SUSPENSION ORAL at 17:31

## 2021-08-21 RX ADMIN — BARICITINIB 4 MG: 2 TABLET, FILM COATED ORAL at 08:42

## 2021-08-21 RX ADMIN — LISINOPRIL AND HYDROCHLOROTHIAZIDE 1 TABLET: 12.5; 2 TABLET ORAL at 08:42

## 2021-08-21 RX ADMIN — IPRATROPIUM BROMIDE AND ALBUTEROL 1 PUFF: 20; 100 SPRAY, METERED RESPIRATORY (INHALATION) at 20:03

## 2021-08-21 RX ADMIN — SODIUM CHLORIDE 30 ML: 9 INJECTION, SOLUTION INTRAVENOUS at 23:40

## 2021-08-21 RX ADMIN — PANTOPRAZOLE SODIUM 40 MG: 40 TABLET, DELAYED RELEASE ORAL at 06:40

## 2021-08-21 RX ADMIN — NYSTATIN 500000 UNITS: 100000 SUSPENSION ORAL at 08:42

## 2021-08-21 RX ADMIN — ALLOPURINOL 300 MG: 300 TABLET ORAL at 08:42

## 2021-08-21 RX ADMIN — IPRATROPIUM BROMIDE AND ALBUTEROL 1 PUFF: 20; 100 SPRAY, METERED RESPIRATORY (INHALATION) at 16:03

## 2021-08-21 RX ADMIN — METHYLPREDNISOLONE SODIUM SUCCINATE 40 MG: 40 INJECTION, POWDER, FOR SOLUTION INTRAMUSCULAR; INTRAVENOUS at 17:31

## 2021-08-21 RX ADMIN — METOPROLOL TARTRATE 25 MG: 25 TABLET, FILM COATED ORAL at 21:21

## 2021-08-21 RX ADMIN — SODIUM CHLORIDE 30 ML: 9 INJECTION, SOLUTION INTRAVENOUS at 23:44

## 2021-08-21 RX ADMIN — INSULIN GLARGINE 21 UNITS: 100 INJECTION, SOLUTION SUBCUTANEOUS at 21:24

## 2021-08-21 RX ADMIN — NYSTATIN 500000 UNITS: 100000 SUSPENSION ORAL at 12:44

## 2021-08-21 RX ADMIN — APIXABAN 5 MG: 5 TABLET, FILM COATED ORAL at 08:42

## 2021-08-21 RX ADMIN — METOPROLOL TARTRATE 25 MG: 25 TABLET, FILM COATED ORAL at 08:42

## 2021-08-21 RX ADMIN — INSULIN LISPRO 9 UNITS: 100 INJECTION, SOLUTION INTRAVENOUS; SUBCUTANEOUS at 21:24

## 2021-08-21 RX ADMIN — INSULIN LISPRO 3 UNITS: 100 INJECTION, SOLUTION INTRAVENOUS; SUBCUTANEOUS at 08:46

## 2021-08-21 RX ADMIN — NYSTATIN 500000 UNITS: 100000 SUSPENSION ORAL at 21:21

## 2021-08-21 RX ADMIN — INSULIN LISPRO 15 UNITS: 100 INJECTION, SOLUTION INTRAVENOUS; SUBCUTANEOUS at 14:18

## 2021-08-21 RX ADMIN — INSULIN LISPRO 18 UNITS: 100 INJECTION, SOLUTION INTRAVENOUS; SUBCUTANEOUS at 17:44

## 2021-08-21 RX ADMIN — LEVOFLOXACIN 500 MG: 5 INJECTION, SOLUTION INTRAVENOUS at 23:45

## 2021-08-21 RX ADMIN — METHYLPREDNISOLONE SODIUM SUCCINATE 40 MG: 40 INJECTION, POWDER, FOR SOLUTION INTRAMUSCULAR; INTRAVENOUS at 06:40

## 2021-08-21 RX ADMIN — INSULIN LISPRO 10 UNITS: 100 INJECTION, SOLUTION INTRAVENOUS; SUBCUTANEOUS at 14:19

## 2021-08-21 RX ADMIN — INSULIN LISPRO 10 UNITS: 100 INJECTION, SOLUTION INTRAVENOUS; SUBCUTANEOUS at 17:44

## 2021-08-21 RX ADMIN — APIXABAN 5 MG: 5 TABLET, FILM COATED ORAL at 21:21

## 2021-08-21 RX ADMIN — IPRATROPIUM BROMIDE AND ALBUTEROL 1 PUFF: 20; 100 SPRAY, METERED RESPIRATORY (INHALATION) at 08:32

## 2021-08-21 RX ADMIN — IPRATROPIUM BROMIDE AND ALBUTEROL 1 PUFF: 20; 100 SPRAY, METERED RESPIRATORY (INHALATION) at 11:49

## 2021-08-21 NOTE — PLAN OF CARE
Problem: Falls - Risk of:  Goal: Will remain free from falls  Description: Will remain free from falls  Outcome: Ongoing  Goal: Absence of physical injury  Description: Absence of physical injury  Outcome: Ongoing     Problem: Airway Clearance - Ineffective  Goal: Achieve or maintain patent airway  Outcome: Ongoing     Problem: Gas Exchange - Impaired  Goal: Absence of hypoxia  Outcome: Ongoing  Goal: Promote optimal lung function  Outcome: Ongoing     Problem: Breathing Pattern - Ineffective  Goal: Ability to achieve and maintain a regular respiratory rate  Outcome: Ongoing     Problem:  Body Temperature -  Risk of, Imbalanced  Goal: Ability to maintain a body temperature within defined limits  Outcome: Ongoing  Goal: Will regain or maintain usual level of consciousness  Outcome: Ongoing  Goal: Complications related to the disease process, condition or treatment will be avoided or minimized  Outcome: Ongoing     Problem: Isolation Precautions - Risk of Spread of Infection  Goal: Prevent transmission of infection  Outcome: Ongoing     Problem: Risk for Fluid Volume Deficit  Goal: Maintain normal heart rhythm  Outcome: Ongoing  Goal: Maintain absence of muscle cramping  Outcome: Ongoing  Goal: Maintain normal serum potassium, sodium, calcium, phosphorus, and pH  Outcome: Ongoing     Problem: Loneliness or Risk for Loneliness  Goal: Demonstrate positive use of time alone when socialization is not possible  Outcome: Ongoing     Problem: Fatigue  Goal: Verbalize increase energy and improved vitality  Outcome: Ongoing     Problem: Pain:  Goal: Pain level will decrease  Description: Pain level will decrease  Outcome: Ongoing  Goal: Control of acute pain  Description: Control of acute pain  Outcome: Ongoing  Goal: Control of chronic pain  Description: Control of chronic pain  Outcome: Ongoing

## 2021-08-21 NOTE — PROGRESS NOTES
Hospitalist Progress Note      PCP: Tarsha Rudd MD    Date of Admission: 8/17/2021    Chief Complaint: shortness of breath, cough, fatigue    Hospital Course: The patient is a 69 y.o. female with hx HTN, obesity, HTN, anxiety/depression who presents to [facility] with shortness of breath, cough, and fatigue. Her symptoms began about 5 days ago and have been constant. Appetite has been poor and patient states she has no energy. Denies fever, chills, chest pain, abdominal pain, nausea, vomiting, constipation, diarrhea, and dysuria.     In the ED, labs were significant for a sodium of 128, chloride of 91, pro-BNP of 1,769, glucose of 377. She tested positive for COVID-19. CXR showed left mid and bibasilar infiltrates consistent with pneumonia. CT Chest without contrast showed moderate multifocal opacity throughout all lobes bilaterally, trace pleural effusions, mild mediastinal adenopathy, bilateral adrenal adenomas, and mild mural thickening of the distal esophagus. Subjective: Pt seen and examined. She feels about the same. Oxygen improved 15L -> 13L today.      Medications:  Reviewed    Infusion Medications    dextrose       Scheduled Medications    insulin lispro  10 Units Subcutaneous TID WC    insulin glargine  21 Units Subcutaneous Nightly    metoprolol tartrate  25 mg Oral BID    apixaban  5 mg Oral BID    albuterol-ipratropium  1 puff Inhalation Q4H WA    methylPREDNISolone sodium  40 mg Intravenous Q12H    nystatin  5 mL Oral 4x Daily    remdesivir IVPB  100 mg Intravenous Q24H    insulin lispro  0-18 Units Subcutaneous TID WC    insulin lispro  0-9 Units Subcutaneous Nightly    baricitinib  4 mg Oral Daily    allopurinol  300 mg Oral Daily    lisinopril-hydroCHLOROthiazide  1 tablet Oral Daily    levofloxacin  500 mg Intravenous Q24H    pantoprazole  40 mg Oral QAM AC     PRN Meds: sodium chloride, glucose, dextrose, glucagon (rDNA), dextrose, perflutren lipid microspheres, acetaminophen, ALPRAZolam, HYDROcodone-acetaminophen      Intake/Output Summary (Last 24 hours) at 8/21/2021 1038  Last data filed at 8/21/2021 0704  Gross per 24 hour   Intake 250.32 ml   Output --   Net 250.32 ml       Exam:    /74   Pulse 95   Temp 97.5 °F (36.4 °C) (Oral)   Resp 20   Ht 5' 5\" (1.651 m)   Wt 240 lb 4.8 oz (109 kg)   SpO2 (!) 87%   BMI 39.99 kg/m²     General appearance: Ill appearing. HEENT Thrush apparent. Normal cephalic, atraumatic without obvious deformity. Pupils equal, round, and reactive to light. Extra ocular muscles intact. Conjunctivae/corneas clear. Neck: Supple, No jugular venous distention/bruits. Trachea midline without thyromegaly or adenopathy with full range of motion. Lungs: Increased work of breathing, accessory muscle use, lungs clear to auscultation. Heart: Regular rate and rhythm with Normal S1/S2 without murmurs, rubs or gallops, point of maximum impulse non-displaced  Abdomen: Obese, soft, non-tender or non-distended without rigidity or guarding and positive bowel sounds all four quadrants. Extremities: No clubbing, cyanosis, or edema bilaterally. Full range of motion without deformity and normal gait intact. Skin: Skin color, texture, turgor normal.  No rashes or lesions. Neurologic: Alert and oriented X 3, neurovascularly intact with sensory/motor intact upper extremities/lower extremities, bilaterally. Cranial nerves: II-XII intact, grossly non-focal.  Mental status: Alert, oriented, thought content appropriate.   Capillary Refill: Acceptable  < 3 seconds  Peripheral Pulses: +3 Easily felt, not easily obliterated with pressure      Labs:   Recent Labs     08/19/21  0556 08/20/21  0548 08/21/21  0659   WBC 5.2 6.5 8.2   HGB 12.3 12.8 14.0   HCT 37.1 38.8 43.0    283 327     Recent Labs     08/19/21  0556 08/20/21  0548 08/21/21  0659    139 137   K 4.2 4.6 4.9    101 99   CO2 23 25 23   BUN 36* 42* 45* CREATININE 0.9 1.0 1.0   CALCIUM 9.2 9.6 9.9     Recent Labs     08/19/21  0556 08/20/21  0548 08/21/21  0659   AST 14* 13* 15   ALT 14 14 13   BILITOT <0.2 <0.2 0.3   ALKPHOS 69 67 73     No results for input(s): INR in the last 72 hours. No results for input(s): Addison Breeze in the last 72 hours. Urinalysis:      Lab Results   Component Value Date    NITRU Negative 08/17/2021    WBCUA 6 08/17/2021    RBCUA 2 08/17/2021    BLOODU Negative 08/17/2021    SPECGRAV 1.014 08/17/2021    GLUCOSEU >=1000 08/17/2021    GLUCOSEU >=1000 06/04/2010       Radiology:  CT CHEST WO CONTRAST   Final Result   Moderate multifocal opacity throughout all lobes bilaterally, consistent with   viral pneumonitis in this patient with history of COVID-19 infection. Trace pleural effusions. Mild mediastinal adenopathy, likely reactive. Bilateral adrenal adenomas. Mild mural thickening of the distal esophagus; correlate for esophagitis. XR CHEST PORTABLE   Final Result   Left mid and bibasilar infiltrates consistent with pneumonia.                  Assessment/Plan:    Active Hospital Problems    Diagnosis Date Noted    Acute respiratory failure ([facility] [address] [J96.00] 08/17/2021       Pneumonia due to COVID-19 infection  -IV Solumedrol  -Remdesivir x 10 days  -baricitnib x 14 days  -vitamin D supplementation  -empiric IV Vanc and Levaquin to cover for possible superimposed bacterial infection  -MRSA swab  -PPE  -supportive care  -contact plus isolation  -Lovenox BID-> Eliquis with Afib overnight  -Combivent QID  -trend inflammatory markers     Remdesivir Initiation Note     This patient meets criteria for initiation of remdesivir based on the following:  · Proven COVID-19  · Moderate disease (Requiring supplemental O2)  · Acceptable hepatic function (ALT within 5 times ULN)     Exclusion Criteria:  · Severe disease requiring invasive or non-invasive mechanical ventilation (includes HFNC & BiPAP)  · Could consider use in patients requiring high flow if early on in the disease course (based on symptom duration)  · Use of more than 1 vasopressor prior to remdesivir initiation  · Already improving on supportive treatment and/or impending discharge  · Patients in whom the clinical team think death is in the immediate short-term where remdesivir is unlikely to change the clinical outcome     Liver function tests will be monitored daily while on remdesivir. Atrial fibrillation with rapid ventricular response  -cardiology consulted, recs appreciated  -PO Lopressor 25 mg BID  -PO Eliquis 5 mg BID  -tele monitoring     Acute respiratory failure with hypoxia - due to COVID-19 infection  -management as above  -wean oxygen to maintain SpO2 > 89%     Type 2 DM - uncontrolled  -Lantus adjusted  -Humalog adjusted  -SSI  -hypoglycemia protocol  -POCT glucose checks     Anxiety and depression  -continue home meds     Essential hypertension  -continue home meds     Obesity - BMI 39.71  -nutritional counseling provided  -weight loss encouraged  -complicating medical management      DVT Prophylaxis: Eliquis  Diet: ADULT DIET;  Regular; 4 carb choices (60 gm/meal)  Adult Oral Nutrition Supplement; Standard High Calorie/High Protein Oral Supplement  Code Status: Full Code    PT/OT Eval Status: defer    Dispo - continue care    Dena Yarbrough MD

## 2021-08-21 NOTE — PROGRESS NOTES
Pulmonary/Critical Care Progress Note    CC:  Follow-up:  Acute hypoxic respiratory failure  COVID 19 pneumonia  Multifocal bacterial pneumonia  BMI 40    Subjective:  Remains on 13L HFNC  Faint and dizzy with activity  No coughing  LÓPEZ  Poor appetite    ROS  No cough  LÓPEZ and dizziness  No fever    Intake/Output Summary (Last 24 hours) at 8/21/2021 0908  Last data filed at 8/21/2021 0704  Gross per 24 hour   Intake 370.32 ml   Output --   Net 370.32 ml         PHYSICAL EXAM:  Blood pressure 139/74, pulse 95, temperature 97.5 °F (36.4 °C), temperature source Oral, resp. rate 20, height 5' 5\" (1.651 m), weight 240 lb 4.8 oz (109 kg), SpO2 (!) 87 %, not currently breastfeeding.'  Gen: No distress. Well developed, well-nourished  Eyes: No scleral icterus. No conjunctival injection. ENT: External appearance of ears and nose normal.  Neck: Trachea midline. No obvious mass. No visible thyroid enlargement     Respiratory: No crackles. No wheezes. No rhonchi. Diminished  Cardiovascular: Regular rate. Regular rhythm. No murmur or rub. Edema in bilateral ankles  GI: Non-tender. Non-distended. No hernia. Bowel sounds present  Skin: Warm, dry, normal texture and turgor. No abnormalities on exposed extremities. Musculoskeletal: No cyanosis. No clubbing. No joint deformity. Neuro: Moves all four extremities.    Psychiatric:  Normal mood and affect; exhibits normal insight and judgement     Medications:    Scheduled Meds:   insulin lispro  10 Units Subcutaneous TID     insulin glargine  21 Units Subcutaneous Nightly    metoprolol tartrate  25 mg Oral BID    apixaban  5 mg Oral BID    albuterol-ipratropium  1 puff Inhalation Q4H WA    methylPREDNISolone sodium  40 mg Intravenous Q12H    nystatin  5 mL Oral 4x Daily    remdesivir IVPB  100 mg Intravenous Q24H    insulin lispro  0-18 Units Subcutaneous TID     insulin lispro  0-9 Units Subcutaneous Nightly    baricitinib  4 mg Oral Daily    allopurinol  300 mg Oral Daily    lisinopril-hydroCHLOROthiazide  1 tablet Oral Daily    levofloxacin  500 mg Intravenous Q24H    pantoprazole  40 mg Oral QAM AC       Continuous Infusions:   dextrose         PRN Meds:  sodium chloride, glucose, dextrose, glucagon (rDNA), dextrose, perflutren lipid microspheres, acetaminophen, ALPRAZolam, HYDROcodone-acetaminophen    Labs:  CBC:   Recent Labs     08/19/21  0556 08/20/21  0548 08/21/21  0659   WBC 5.2 6.5 8.2   HGB 12.3 12.8 14.0   HCT 37.1 38.8 43.0   MCV 94.7 93.4 94.5    283 327     BMP:   Recent Labs     08/19/21  0556 08/20/21  0548 08/21/21  0659    139 137   K 4.2 4.6 4.9    101 99   CO2 23 25 23   BUN 36* 42* 45*   CREATININE 0.9 1.0 1.0     LIVER PROFILE:   Recent Labs     08/19/21  0556 08/20/21  0548 08/21/21  0659   AST 14* 13* 15   ALT 14 14 13   BILITOT <0.2 <0.2 0.3   ALKPHOS 69 67 73     PT/INR: No results for input(s): PROTIME, INR in the last 72 hours. APTT: No results for input(s): APTT in the last 72 hours. UA:No results for input(s): NITRITE, COLORU, PHUR, LABCAST, WBCUA, RBCUA, MUCUS, TRICHOMONAS, YEAST, BACTERIA, CLARITYU, SPECGRAV, LEUKOCYTESUR, UROBILINOGEN, BILIRUBINUR, BLOODU, GLUCOSEU, AMORPHOUS in the last 72 hours. Invalid input(s): Latosha Odor  No results for input(s): PH, PCO2, PO2 in the last 72 hours. Films:  Chest imaging and associated reports were personally reviewed and showed 8/18: Moderate multifocal opacity throughout all lobes bilaterally, consistent with   viral pneumonitis in this patient with history of COVID-19 infection.       Trace pleural effusions.  Mild mediastinal adenopathy, likely reactive.       Bilateral adrenal adenomas.       Mild mural thickening of the distal esophagus; correlate for esophagitis. ABG:  No study    Cultures:  Blood: no growth  MRSA probe: negative  COVID19: positive    ECHO  2021   Summary   Left ventricular cavity size is normal.   Normal left ventricular wall thickness. Ejection fraction is visually estimated to be 50-55%. No regional wall motion abnormalities are noted. Indeterminate diastolic function. Normal right ventricular size and function. Right ventricular systolic function is normal.   Mild tricuspid regurgitation. PFTs  None    Assessment/Plan:     Acute hypoxic respiratory failure  Maintain oxygen saturations >90% with supplemental oxygen and wean as tolerated  Encourage self-proning    COVID 19 pneumonia  Remdesivir and baricitinib  Solumedrol  Combivent   Encourage self-proning    Multifocal bacterial pneumonia  Remains on levaquin  Add acapella and IS    BMI 40     DVT prophylaxis with eliquis    Thank you for allowing me to participate in the care of this patient. Will follow.     Layla Boyd, ACNP  [facility] Pulmonary, Sleep, and Critical Care

## 2021-08-21 NOTE — PLAN OF CARE
Problem: Falls - Risk of:  Goal: Will remain free from falls  Description: Will remain free from falls  8/21/2021 1520 by Nolan Dai RN  Outcome: Ongoing     Problem: Falls - Risk of:  Goal: Absence of physical injury  Description: Absence of physical injury  8/21/2021 1520 by Nolan Dai RN  Outcome: Ongoing     Problem: Airway Clearance - Ineffective  Goal: Achieve or maintain patent airway  8/21/2021 1520 by Nolan Dai RN  Outcome: Ongoing

## 2021-08-22 LAB
A/G RATIO: 1 (ref 1.1–2.2)
ALBUMIN SERPL-MCNC: 3.1 G/DL (ref 3.4–5)
ALP BLD-CCNC: 68 U/L (ref 40–129)
ALT SERPL-CCNC: 12 U/L (ref 10–40)
ANION GAP SERPL CALCULATED.3IONS-SCNC: 12 MMOL/L (ref 3–16)
AST SERPL-CCNC: 14 U/L (ref 15–37)
BASOPHILS ABSOLUTE: 0 K/UL (ref 0–0.2)
BASOPHILS RELATIVE PERCENT: 0.1 %
BILIRUB SERPL-MCNC: 0.3 MG/DL (ref 0–1)
BUN BLDV-MCNC: 51 MG/DL (ref 7–20)
C-REACTIVE PROTEIN: 24 MG/L (ref 0–5.1)
CALCIUM SERPL-MCNC: 9.9 MG/DL (ref 8.3–10.6)
CHLORIDE BLD-SCNC: 98 MMOL/L (ref 99–110)
CO2: 27 MMOL/L (ref 21–32)
CREAT SERPL-MCNC: 1.2 MG/DL (ref 0.6–1.2)
D DIMER: 365 NG/ML DDU (ref 0–229)
EOSINOPHILS ABSOLUTE: 0 K/UL (ref 0–0.6)
EOSINOPHILS RELATIVE PERCENT: 0 %
FERRITIN: 729.8 NG/ML (ref 15–150)
GFR AFRICAN AMERICAN: 53
GFR NON-AFRICAN AMERICAN: 44
GLOBULIN: 3.1 G/DL
GLUCOSE BLD-MCNC: 208 MG/DL (ref 70–99)
GLUCOSE BLD-MCNC: 226 MG/DL (ref 70–99)
GLUCOSE BLD-MCNC: 255 MG/DL (ref 70–99)
GLUCOSE BLD-MCNC: 263 MG/DL (ref 70–99)
GLUCOSE BLD-MCNC: 405 MG/DL (ref 70–99)
GLUCOSE BLD-MCNC: 412 MG/DL (ref 70–99)
HCT VFR BLD CALC: 41.5 % (ref 36–48)
HEMOGLOBIN: 13.8 G/DL (ref 12–16)
LYMPHOCYTES ABSOLUTE: 1 K/UL (ref 1–5.1)
LYMPHOCYTES RELATIVE PERCENT: 8.6 %
MCH RBC QN AUTO: 30.9 PG (ref 26–34)
MCHC RBC AUTO-ENTMCNC: 33.2 G/DL (ref 31–36)
MCV RBC AUTO: 93.2 FL (ref 80–100)
MONOCYTES ABSOLUTE: 0.7 K/UL (ref 0–1.3)
MONOCYTES RELATIVE PERCENT: 6.3 %
NEUTROPHILS ABSOLUTE: 9.6 K/UL (ref 1.7–7.7)
NEUTROPHILS RELATIVE PERCENT: 85 %
PDW BLD-RTO: 14 % (ref 12.4–15.4)
PERFORMED ON: ABNORMAL
PLATELET # BLD: 379 K/UL (ref 135–450)
PMV BLD AUTO: 8.7 FL (ref 5–10.5)
POTASSIUM REFLEX MAGNESIUM: 5.2 MMOL/L (ref 3.5–5.1)
PROCALCITONIN: 0.09 NG/ML (ref 0–0.15)
RBC # BLD: 4.45 M/UL (ref 4–5.2)
SODIUM BLD-SCNC: 137 MMOL/L (ref 136–145)
TOTAL PROTEIN: 6.2 G/DL (ref 6.4–8.2)
WBC # BLD: 11.3 K/UL (ref 4–11)

## 2021-08-22 PROCEDURE — 2060000000 HC ICU INTERMEDIATE R&B

## 2021-08-22 PROCEDURE — 6370000000 HC RX 637 (ALT 250 FOR IP): Performed by: INTERNAL MEDICINE

## 2021-08-22 PROCEDURE — 94640 AIRWAY INHALATION TREATMENT: CPT

## 2021-08-22 PROCEDURE — 2500000003 HC RX 250 WO HCPCS: Performed by: INTERNAL MEDICINE

## 2021-08-22 PROCEDURE — 94761 N-INVAS EAR/PLS OXIMETRY MLT: CPT

## 2021-08-22 PROCEDURE — 85025 COMPLETE CBC W/AUTO DIFF WBC: CPT

## 2021-08-22 PROCEDURE — 6360000002 HC RX W HCPCS: Performed by: INTERNAL MEDICINE

## 2021-08-22 PROCEDURE — 2700000000 HC OXYGEN THERAPY PER DAY

## 2021-08-22 PROCEDURE — 85379 FIBRIN DEGRADATION QUANT: CPT

## 2021-08-22 PROCEDURE — 80053 COMPREHEN METABOLIC PANEL: CPT

## 2021-08-22 PROCEDURE — 82728 ASSAY OF FERRITIN: CPT

## 2021-08-22 PROCEDURE — 84145 PROCALCITONIN (PCT): CPT

## 2021-08-22 PROCEDURE — 36415 COLL VENOUS BLD VENIPUNCTURE: CPT

## 2021-08-22 PROCEDURE — 2580000003 HC RX 258: Performed by: INTERNAL MEDICINE

## 2021-08-22 PROCEDURE — 86140 C-REACTIVE PROTEIN: CPT

## 2021-08-22 RX ORDER — INSULIN GLARGINE 100 [IU]/ML
24 INJECTION, SOLUTION SUBCUTANEOUS NIGHTLY
Status: DISCONTINUED | OUTPATIENT
Start: 2021-08-22 | End: 2021-08-23

## 2021-08-22 RX ADMIN — METHYLPREDNISOLONE SODIUM SUCCINATE 40 MG: 40 INJECTION, POWDER, FOR SOLUTION INTRAMUSCULAR; INTRAVENOUS at 17:53

## 2021-08-22 RX ADMIN — ACETAMINOPHEN 650 MG: 325 TABLET ORAL at 18:05

## 2021-08-22 RX ADMIN — ALLOPURINOL 300 MG: 300 TABLET ORAL at 08:55

## 2021-08-22 RX ADMIN — INSULIN LISPRO 14 UNITS: 100 INJECTION, SOLUTION INTRAVENOUS; SUBCUTANEOUS at 11:52

## 2021-08-22 RX ADMIN — IPRATROPIUM BROMIDE AND ALBUTEROL 1 PUFF: 20; 100 SPRAY, METERED RESPIRATORY (INHALATION) at 21:10

## 2021-08-22 RX ADMIN — INSULIN GLARGINE 24 UNITS: 100 INJECTION, SOLUTION SUBCUTANEOUS at 20:41

## 2021-08-22 RX ADMIN — METOPROLOL TARTRATE 25 MG: 25 TABLET, FILM COATED ORAL at 20:29

## 2021-08-22 RX ADMIN — NYSTATIN 500000 UNITS: 100000 SUSPENSION ORAL at 20:29

## 2021-08-22 RX ADMIN — ACETAMINOPHEN 650 MG: 325 TABLET ORAL at 02:34

## 2021-08-22 RX ADMIN — NYSTATIN 500000 UNITS: 100000 SUSPENSION ORAL at 17:53

## 2021-08-22 RX ADMIN — IPRATROPIUM BROMIDE AND ALBUTEROL 1 PUFF: 20; 100 SPRAY, METERED RESPIRATORY (INHALATION) at 16:46

## 2021-08-22 RX ADMIN — APIXABAN 5 MG: 5 TABLET, FILM COATED ORAL at 20:29

## 2021-08-22 RX ADMIN — LISINOPRIL AND HYDROCHLOROTHIAZIDE 1 TABLET: 12.5; 2 TABLET ORAL at 08:55

## 2021-08-22 RX ADMIN — LEVOFLOXACIN 500 MG: 5 INJECTION, SOLUTION INTRAVENOUS at 23:21

## 2021-08-22 RX ADMIN — INSULIN LISPRO 6 UNITS: 100 INJECTION, SOLUTION INTRAVENOUS; SUBCUTANEOUS at 08:56

## 2021-08-22 RX ADMIN — IPRATROPIUM BROMIDE AND ALBUTEROL 1 PUFF: 20; 100 SPRAY, METERED RESPIRATORY (INHALATION) at 12:49

## 2021-08-22 RX ADMIN — NYSTATIN 500000 UNITS: 100000 SUSPENSION ORAL at 08:55

## 2021-08-22 RX ADMIN — REMDESIVIR 100 MG: 5 INJECTION INTRAVENOUS at 11:51

## 2021-08-22 RX ADMIN — NYSTATIN 500000 UNITS: 100000 SUSPENSION ORAL at 11:49

## 2021-08-22 RX ADMIN — INSULIN LISPRO 10 UNITS: 100 INJECTION, SOLUTION INTRAVENOUS; SUBCUTANEOUS at 08:56

## 2021-08-22 RX ADMIN — METHYLPREDNISOLONE SODIUM SUCCINATE 40 MG: 40 INJECTION, POWDER, FOR SOLUTION INTRAMUSCULAR; INTRAVENOUS at 07:00

## 2021-08-22 RX ADMIN — METOPROLOL TARTRATE 25 MG: 25 TABLET, FILM COATED ORAL at 08:55

## 2021-08-22 RX ADMIN — INSULIN LISPRO 14 UNITS: 100 INJECTION, SOLUTION INTRAVENOUS; SUBCUTANEOUS at 17:54

## 2021-08-22 RX ADMIN — APIXABAN 5 MG: 5 TABLET, FILM COATED ORAL at 08:55

## 2021-08-22 RX ADMIN — IPRATROPIUM BROMIDE AND ALBUTEROL 1 PUFF: 20; 100 SPRAY, METERED RESPIRATORY (INHALATION) at 08:21

## 2021-08-22 RX ADMIN — BARICITINIB 4 MG: 2 TABLET, FILM COATED ORAL at 08:55

## 2021-08-22 RX ADMIN — INSULIN LISPRO 9 UNITS: 100 INJECTION, SOLUTION INTRAVENOUS; SUBCUTANEOUS at 11:51

## 2021-08-22 RX ADMIN — INSULIN LISPRO 18 UNITS: 100 INJECTION, SOLUTION INTRAVENOUS; SUBCUTANEOUS at 17:54

## 2021-08-22 RX ADMIN — INSULIN LISPRO 9 UNITS: 100 INJECTION, SOLUTION INTRAVENOUS; SUBCUTANEOUS at 20:41

## 2021-08-22 RX ADMIN — PANTOPRAZOLE SODIUM 40 MG: 40 TABLET, DELAYED RELEASE ORAL at 07:00

## 2021-08-22 ASSESSMENT — PAIN DESCRIPTION - LOCATION
LOCATION: BACK
LOCATION: ABDOMEN

## 2021-08-22 ASSESSMENT — PAIN DESCRIPTION - PAIN TYPE
TYPE: ACUTE PAIN
TYPE: CHRONIC PAIN

## 2021-08-22 ASSESSMENT — PAIN DESCRIPTION - PROGRESSION
CLINICAL_PROGRESSION: GRADUALLY WORSENING
CLINICAL_PROGRESSION: NOT CHANGED

## 2021-08-22 ASSESSMENT — PAIN SCALES - GENERAL
PAINLEVEL_OUTOF10: 0
PAINLEVEL_OUTOF10: 3
PAINLEVEL_OUTOF10: 0
PAINLEVEL_OUTOF10: 1
PAINLEVEL_OUTOF10: 3
PAINLEVEL_OUTOF10: 0

## 2021-08-22 ASSESSMENT — PAIN DESCRIPTION - FREQUENCY
FREQUENCY: CONTINUOUS
FREQUENCY: INTERMITTENT

## 2021-08-22 ASSESSMENT — PAIN - FUNCTIONAL ASSESSMENT
PAIN_FUNCTIONAL_ASSESSMENT: ACTIVITIES ARE NOT PREVENTED
PAIN_FUNCTIONAL_ASSESSMENT: ACTIVITIES ARE NOT PREVENTED

## 2021-08-22 ASSESSMENT — PAIN DESCRIPTION - ORIENTATION
ORIENTATION: LOWER
ORIENTATION: MID

## 2021-08-22 ASSESSMENT — PAIN SCALES - WONG BAKER
WONGBAKER_NUMERICALRESPONSE: 0
WONGBAKER_NUMERICALRESPONSE: 0

## 2021-08-22 ASSESSMENT — PAIN DESCRIPTION - DESCRIPTORS
DESCRIPTORS: BURNING
DESCRIPTORS: CRAMPING

## 2021-08-22 ASSESSMENT — PAIN DESCRIPTION - ONSET
ONSET: ON-GOING
ONSET: GRADUAL

## 2021-08-22 NOTE — PLAN OF CARE
Problem: Falls - Risk of:  Goal: Will remain free from falls  Description: Will remain free from falls  8/22/2021 1026 by Margi Aviles RN  Outcome: Ongoing  8/21/2021 2252 by Sancho Mccord RN  Outcome: Ongoing  Goal: Absence of physical injury  Description: Absence of physical injury  8/22/2021 1026 by Margi Aviles RN  Outcome: Ongoing  8/21/2021 2252 by Sancho Mccord RN  Outcome: Ongoing     Problem: Airway Clearance - Ineffective  Goal: Achieve or maintain patent airway  8/22/2021 1026 by Margi Aviles RN  Outcome: Ongoing  8/21/2021 2252 by Sancho Mccord RN  Outcome: Ongoing     Problem: Gas Exchange - Impaired  Goal: Absence of hypoxia  8/22/2021 1026 by Margi Aviles RN  Outcome: Ongoing  8/21/2021 2252 by Sancho Mccord RN  Outcome: Ongoing  Goal: Promote optimal lung function  8/22/2021 1026 by Margi Aviles RN  Outcome: Ongoing  8/21/2021 2252 by Sancho Mccord RN  Outcome: Ongoing     Problem: Breathing Pattern - Ineffective  Goal: Ability to achieve and maintain a regular respiratory rate  8/22/2021 1026 by Margi Aviles RN  Outcome: Ongoing  8/21/2021 2252 by Sancho Mccord RN  Outcome: Ongoing     Problem:  Body Temperature -  Risk of, Imbalanced  Goal: Ability to maintain a body temperature within defined limits  8/22/2021 1026 by Margi Aviles RN  Outcome: Ongoing  8/21/2021 2252 by Sancho Mccord RN  Outcome: Ongoing  Goal: Will regain or maintain usual level of consciousness  8/22/2021 1026 by Margi Aviles RN  Outcome: Ongoing  8/21/2021 2252 by Sancho Mccord RN  Outcome: Ongoing  Goal: Complications related to the disease process, condition or treatment will be avoided or minimized  8/22/2021 1026 by Margi Aviles RN  Outcome: Ongoing  8/21/2021 2252 by Sancho Mccord RN  Outcome: Ongoing     Problem: Isolation Precautions - Risk of Spread of Infection  Goal: Prevent transmission of infection  8/22/2021 1026 by Pantera López RN  Outcome: Ongoing  8/21/2021 2252 by Kris Pisano RN  Outcome: Ongoing     Problem: Nutrition Deficits  Goal: Optimize nutritional status  Outcome: Ongoing     Problem: Risk for Fluid Volume Deficit  Goal: Maintain normal heart rhythm  8/22/2021 1026 by Pantera López RN  Outcome: Ongoing  8/21/2021 2252 by Kris Pisano RN  Outcome: Ongoing  Goal: Maintain absence of muscle cramping  8/22/2021 1026 by Patnera López RN  Outcome: Ongoing  8/21/2021 2252 by Kris Pisano RN  Outcome: Ongoing  Goal: Maintain normal serum potassium, sodium, calcium, phosphorus, and pH  8/22/2021 1026 by Pantera López RN  Outcome: Ongoing  8/21/2021 2252 by Kris Pisano RN  Outcome: Ongoing

## 2021-08-22 NOTE — PROGRESS NOTES
Hospitalist Progress Note      PCP: Eneida Parra MD    Date of Admission: 8/17/2021    Chief Complaint: shortness of breath, cough, fatigue    Hospital Course: The patient is a 69 y.o. female with hx HTN, obesity, HTN, anxiety/depression who presents to [facility] with shortness of breath, cough, and fatigue. Her symptoms began about 5 days ago and have been constant. Appetite has been poor and patient states she has no energy. Denies fever, chills, chest pain, abdominal pain, nausea, vomiting, constipation, diarrhea, and dysuria.     In the ED, labs were significant for a sodium of 128, chloride of 91, pro-BNP of 1,769, glucose of 377. She tested positive for COVID-19. CXR showed left mid and bibasilar infiltrates consistent with pneumonia. CT Chest without contrast showed moderate multifocal opacity throughout all lobes bilaterally, trace pleural effusions, mild mediastinal adenopathy, bilateral adrenal adenomas, and mild mural thickening of the distal esophagus. Subjective: Pt seen and examined. She feels down. Oxygen better, 13L -> 11L.     Medications:  Reviewed    Infusion Medications    dextrose       Scheduled Medications    insulin glargine  24 Units Subcutaneous Nightly    insulin lispro  14 Units Subcutaneous TID     levofloxacin  500 mg Intravenous Q24H    metoprolol tartrate  25 mg Oral BID    apixaban  5 mg Oral BID    albuterol-ipratropium  1 puff Inhalation Q4H WA    methylPREDNISolone sodium  40 mg Intravenous Q12H    nystatin  5 mL Oral 4x Daily    remdesivir IVPB  100 mg Intravenous Q24H    insulin lispro  0-18 Units Subcutaneous TID     insulin lispro  0-9 Units Subcutaneous Nightly    baricitinib  4 mg Oral Daily    allopurinol  300 mg Oral Daily    lisinopril-hydroCHLOROthiazide  1 tablet Oral Daily    pantoprazole  40 mg Oral QAM AC     PRN Meds: sodium chloride, glucose, dextrose, glucagon (rDNA), dextrose, perflutren lipid microspheres, acetaminophen, ALPRAZolam, HYDROcodone-acetaminophen      Intake/Output Summary (Last 24 hours) at 8/22/2021 1034  Last data filed at 8/22/2021 0659  Gross per 24 hour   Intake 750.69 ml   Output --   Net 750.69 ml       Exam:    BP (!) 140/80   Pulse 99   Temp 97.7 °F (36.5 °C) (Oral)   Resp 20   Ht 5' 5\" (1.651 m)   Wt 239 lb 13.8 oz (108.8 kg)   SpO2 94%   BMI 39.91 kg/m²     General appearance: Ill appearing. HEENT Thrush apparent. Normal cephalic, atraumatic without obvious deformity. Pupils equal, round, and reactive to light. Extra ocular muscles intact. Conjunctivae/corneas clear. Neck: Supple, No jugular venous distention/bruits. Trachea midline without thyromegaly or adenopathy with full range of motion. Lungs: Increased work of breathing, accessory muscle use, lungs clear to auscultation. Heart: Regular rate and rhythm with Normal S1/S2 without murmurs, rubs or gallops, point of maximum impulse non-displaced  Abdomen: Obese, soft, non-tender or non-distended without rigidity or guarding and positive bowel sounds all four quadrants. Extremities: No clubbing, cyanosis, or edema bilaterally. Full range of motion without deformity and normal gait intact. Skin: Skin color, texture, turgor normal.  No rashes or lesions. Neurologic: Alert and oriented X 3, neurovascularly intact with sensory/motor intact upper extremities/lower extremities, bilaterally. Cranial nerves: II-XII intact, grossly non-focal.  Mental status: Alert, oriented, thought content appropriate.   Capillary Refill: Acceptable  < 3 seconds  Peripheral Pulses: +3 Easily felt, not easily obliterated with pressure      Labs:   Recent Labs     08/20/21  0548 08/21/21  0659 08/22/21  0551   WBC 6.5 8.2 11.3*   HGB 12.8 14.0 13.8   HCT 38.8 43.0 41.5    327 379     Recent Labs     08/20/21  0548 08/21/21  0659 08/22/21  0551    137 137   K 4.6 4.9 5.2*    99 98*   CO2 25 23 27   BUN 42* 45* 51*   CREATININE 1.0 1. 0 1.2   CALCIUM 9.6 9.9 9.9     Recent Labs     08/20/21  0548 08/21/21  0659 08/22/21  0551   AST 13* 15 14*   ALT 14 13 12   BILITOT <0.2 0.3 0.3   ALKPHOS 67 73 68     No results for input(s): INR in the last 72 hours. No results for input(s): Verlena Ren in the last 72 hours. Urinalysis:      Lab Results   Component Value Date    NITRU Negative 08/17/2021    WBCUA 6 08/17/2021    RBCUA 2 08/17/2021    BLOODU Negative 08/17/2021    SPECGRAV 1.014 08/17/2021    GLUCOSEU >=1000 08/17/2021    GLUCOSEU >=1000 06/04/2010       Radiology:  CT CHEST WO CONTRAST   Final Result   Moderate multifocal opacity throughout all lobes bilaterally, consistent with   viral pneumonitis in this patient with history of COVID-19 infection. Trace pleural effusions. Mild mediastinal adenopathy, likely reactive. Bilateral adrenal adenomas. Mild mural thickening of the distal esophagus; correlate for esophagitis. XR CHEST PORTABLE   Final Result   Left mid and bibasilar infiltrates consistent with pneumonia.                  Assessment/Plan:    Active Hospital Problems    Diagnosis Date Noted    Community acquired pneumonia of left lower lobe of lung [J18.9]     Acute respiratory failure with hypoxemia ([address] [J96.01]     COVID-19 [U07.1]     Acute respiratory failure (H[facility]) [J96.00] 08/17/2021       Pneumonia due to COVID-19 infection - improving  -IV Solumedrol  -Remdesivir x 10 days  -baricitnib x 14 days  -vitamin D supplementation  -empiric IV Vanc and Levaquin to cover for possible superimposed bacterial infection  -MRSA swab negative, stopped Vanc  -PPE  -supportive care  -contact plus isolation  -Lovenox BID-> Eliquis with Afib noted  -Combivent QID  -trend inflammatory markers     Remdesivir Initiation Note     This patient meets criteria for initiation of remdesivir based on the following:  · Proven COVID-19  · Moderate disease (Requiring supplemental O2)  · Acceptable hepatic function (ALT within 5 times ULN)     Exclusion Criteria:  · Severe disease requiring invasive or non-invasive mechanical ventilation (includes HFNC & BiPAP)  · Could consider use in patients requiring high flow if early on in the disease course (based on symptom duration)  · Use of more than 1 vasopressor prior to remdesivir initiation  · Already improving on supportive treatment and/or impending discharge  · Patients in whom the clinical team think death is in the immediate short-term where remdesivir is unlikely to change the clinical outcome     Liver function tests will be monitored daily while on remdesivir. Atrial fibrillation with rapid ventricular response  -cardiology consulted, recs appreciated  -PO Lopressor 25 mg BID  -PO Eliquis 5 mg BID  -tele monitoring     Acute respiratory failure with hypoxia - due to COVID-19 infection  -management as above  -wean oxygen to maintain SpO2 > 89%     Type 2 DM - uncontrolled  -Lantus adjusted  -Humalog adjusted  -SSI  -hypoglycemia protocol  -POCT glucose checks     Anxiety and depression  -continue home meds     Essential hypertension  -continue home meds     Obesity - BMI 39.71  -nutritional counseling provided  -weight loss encouraged  -complicating medical management      DVT Prophylaxis: Eliquis  Diet: ADULT DIET;  Regular; 4 carb choices (60 gm/meal)  Adult Oral Nutrition Supplement; Standard High Calorie/High Protein Oral Supplement  Code Status: Full Code    PT/OT Eval Status: defer    Dispo - continue care    Gutierrez Vernon MD

## 2021-08-22 NOTE — PLAN OF CARE
Problem: Falls - Risk of:  Goal: Will remain free from falls  Description: Will remain free from falls  8/21/2021 2252 by Khris Fountain RN  Outcome: Ongoing     Problem: Falls - Risk of:  Goal: Absence of physical injury  Description: Absence of physical injury  8/21/2021 2252 by Khris Fountain RN  Outcome: Ongoing     Problem: Airway Clearance - Ineffective  Goal: Achieve or maintain patent airway  8/21/2021 2252 by Khris Fountain RN  Outcome: Ongoing     Problem: Gas Exchange - Impaired  Goal: Absence of hypoxia  Outcome: Ongoing  Goal: Promote optimal lung function  Outcome: Ongoing     Problem: Breathing Pattern - Ineffective  Goal: Ability to achieve and maintain a regular respiratory rate  Outcome: Ongoing     Problem:  Body Temperature -  Risk of, Imbalanced  Goal: Ability to maintain a body temperature within defined limits  Outcome: Ongoing  Goal: Will regain or maintain usual level of consciousness  Outcome: Ongoing  Goal: Complications related to the disease process, condition or treatment will be avoided or minimized  Outcome: Ongoing     Problem: Isolation Precautions - Risk of Spread of Infection  Goal: Prevent transmission of infection  Outcome: Ongoing     Problem: Risk for Fluid Volume Deficit  Goal: Maintain normal heart rhythm  Outcome: Ongoing  Goal: Maintain absence of muscle cramping  Outcome: Ongoing  Goal: Maintain normal serum potassium, sodium, calcium, phosphorus, and pH  Outcome: Ongoing     Problem: Loneliness or Risk for Loneliness  Goal: Demonstrate positive use of time alone when socialization is not possible  Outcome: Ongoing     Problem: Fatigue  Goal: Verbalize increase energy and improved vitality  Outcome: Ongoing     Problem: Pain:  Goal: Pain level will decrease  Description: Pain level will decrease  Outcome: Ongoing  Goal: Control of acute pain  Description: Control of acute pain  Outcome: Ongoing  Goal: Control of chronic pain  Description: Control of chronic pain  Outcome: Ongoing

## 2021-08-23 LAB
A/G RATIO: 1.1 (ref 1.1–2.2)
ALBUMIN SERPL-MCNC: 3 G/DL (ref 3.4–5)
ALP BLD-CCNC: 65 U/L (ref 40–129)
ALT SERPL-CCNC: 11 U/L (ref 10–40)
ANION GAP SERPL CALCULATED.3IONS-SCNC: 11 MMOL/L (ref 3–16)
AST SERPL-CCNC: 10 U/L (ref 15–37)
BANDED NEUTROPHILS RELATIVE PERCENT: 4 % (ref 0–7)
BASOPHILS ABSOLUTE: 0 K/UL (ref 0–0.2)
BASOPHILS RELATIVE PERCENT: 0 %
BILIRUB SERPL-MCNC: 0.3 MG/DL (ref 0–1)
BUN BLDV-MCNC: 57 MG/DL (ref 7–20)
C-REACTIVE PROTEIN: 13.6 MG/L (ref 0–5.1)
CALCIUM SERPL-MCNC: 9.6 MG/DL (ref 8.3–10.6)
CHLORIDE BLD-SCNC: 98 MMOL/L (ref 99–110)
CO2: 29 MMOL/L (ref 21–32)
CREAT SERPL-MCNC: 1.2 MG/DL (ref 0.6–1.2)
D DIMER: 313 NG/ML DDU (ref 0–229)
EOSINOPHILS ABSOLUTE: 0 K/UL (ref 0–0.6)
EOSINOPHILS RELATIVE PERCENT: 0 %
FERRITIN: 574.7 NG/ML (ref 15–150)
GFR AFRICAN AMERICAN: 53
GFR NON-AFRICAN AMERICAN: 44
GLOBULIN: 2.8 G/DL
GLUCOSE BLD-MCNC: 188 MG/DL (ref 70–99)
GLUCOSE BLD-MCNC: 213 MG/DL (ref 70–99)
GLUCOSE BLD-MCNC: 416 MG/DL (ref 70–99)
GLUCOSE BLD-MCNC: 453 MG/DL (ref 70–99)
GLUCOSE BLD-MCNC: 497 MG/DL (ref 70–99)
HCT VFR BLD CALC: 41.7 % (ref 36–48)
HEMOGLOBIN: 14 G/DL (ref 12–16)
LYMPHOCYTES ABSOLUTE: 1.2 K/UL (ref 1–5.1)
LYMPHOCYTES RELATIVE PERCENT: 8 %
MCH RBC QN AUTO: 31 PG (ref 26–34)
MCHC RBC AUTO-ENTMCNC: 33.6 G/DL (ref 31–36)
MCV RBC AUTO: 92.3 FL (ref 80–100)
METAMYELOCYTES RELATIVE PERCENT: 2 %
MONOCYTES ABSOLUTE: 0.6 K/UL (ref 0–1.3)
MONOCYTES RELATIVE PERCENT: 4 %
MYELOCYTE PERCENT: 1 %
NEUTROPHILS ABSOLUTE: 13.3 K/UL (ref 1.7–7.7)
NEUTROPHILS RELATIVE PERCENT: 81 %
PDW BLD-RTO: 14 % (ref 12.4–15.4)
PERFORMED ON: ABNORMAL
PLATELET # BLD: 442 K/UL (ref 135–450)
PLATELET SLIDE REVIEW: ADEQUATE
PMV BLD AUTO: 8.5 FL (ref 5–10.5)
POTASSIUM REFLEX MAGNESIUM: 4.8 MMOL/L (ref 3.5–5.1)
RBC # BLD: 4.52 M/UL (ref 4–5.2)
RBC # BLD: NORMAL 10*6/UL
SLIDE REVIEW: ABNORMAL
SODIUM BLD-SCNC: 138 MMOL/L (ref 136–145)
TOTAL PROTEIN: 5.8 G/DL (ref 6.4–8.2)
WBC # BLD: 15.1 K/UL (ref 4–11)

## 2021-08-23 PROCEDURE — 94640 AIRWAY INHALATION TREATMENT: CPT

## 2021-08-23 PROCEDURE — 2700000000 HC OXYGEN THERAPY PER DAY

## 2021-08-23 PROCEDURE — 6360000002 HC RX W HCPCS: Performed by: INTERNAL MEDICINE

## 2021-08-23 PROCEDURE — 6370000000 HC RX 637 (ALT 250 FOR IP): Performed by: INTERNAL MEDICINE

## 2021-08-23 PROCEDURE — 82728 ASSAY OF FERRITIN: CPT

## 2021-08-23 PROCEDURE — 94761 N-INVAS EAR/PLS OXIMETRY MLT: CPT

## 2021-08-23 PROCEDURE — 85025 COMPLETE CBC W/AUTO DIFF WBC: CPT

## 2021-08-23 PROCEDURE — 85379 FIBRIN DEGRADATION QUANT: CPT

## 2021-08-23 PROCEDURE — 2500000003 HC RX 250 WO HCPCS: Performed by: INTERNAL MEDICINE

## 2021-08-23 PROCEDURE — 99233 SBSQ HOSP IP/OBS HIGH 50: CPT | Performed by: INTERNAL MEDICINE

## 2021-08-23 PROCEDURE — 94669 MECHANICAL CHEST WALL OSCILL: CPT

## 2021-08-23 PROCEDURE — 2060000000 HC ICU INTERMEDIATE R&B

## 2021-08-23 PROCEDURE — 80053 COMPREHEN METABOLIC PANEL: CPT

## 2021-08-23 PROCEDURE — 86140 C-REACTIVE PROTEIN: CPT

## 2021-08-23 PROCEDURE — 36415 COLL VENOUS BLD VENIPUNCTURE: CPT

## 2021-08-23 RX ORDER — INSULIN GLARGINE 100 [IU]/ML
25 INJECTION, SOLUTION SUBCUTANEOUS NIGHTLY
Status: DISCONTINUED | OUTPATIENT
Start: 2021-08-23 | End: 2021-08-25

## 2021-08-23 RX ADMIN — NYSTATIN 500000 UNITS: 100000 SUSPENSION ORAL at 21:21

## 2021-08-23 RX ADMIN — ACETAMINOPHEN 650 MG: 325 TABLET ORAL at 08:50

## 2021-08-23 RX ADMIN — METOPROLOL TARTRATE 25 MG: 25 TABLET, FILM COATED ORAL at 08:49

## 2021-08-23 RX ADMIN — NYSTATIN 500000 UNITS: 100000 SUSPENSION ORAL at 17:30

## 2021-08-23 RX ADMIN — NYSTATIN 500000 UNITS: 100000 SUSPENSION ORAL at 12:24

## 2021-08-23 RX ADMIN — IPRATROPIUM BROMIDE AND ALBUTEROL 1 PUFF: 20; 100 SPRAY, METERED RESPIRATORY (INHALATION) at 15:39

## 2021-08-23 RX ADMIN — INSULIN LISPRO 18 UNITS: 100 INJECTION, SOLUTION INTRAVENOUS; SUBCUTANEOUS at 17:37

## 2021-08-23 RX ADMIN — INSULIN LISPRO 3 UNITS: 100 INJECTION, SOLUTION INTRAVENOUS; SUBCUTANEOUS at 08:52

## 2021-08-23 RX ADMIN — IPRATROPIUM BROMIDE AND ALBUTEROL 1 PUFF: 20; 100 SPRAY, METERED RESPIRATORY (INHALATION) at 20:15

## 2021-08-23 RX ADMIN — INSULIN LISPRO 18 UNITS: 100 INJECTION, SOLUTION INTRAVENOUS; SUBCUTANEOUS at 12:29

## 2021-08-23 RX ADMIN — METOPROLOL TARTRATE 25 MG: 25 TABLET, FILM COATED ORAL at 21:23

## 2021-08-23 RX ADMIN — PANTOPRAZOLE SODIUM 40 MG: 40 TABLET, DELAYED RELEASE ORAL at 05:05

## 2021-08-23 RX ADMIN — INSULIN LISPRO 14 UNITS: 100 INJECTION, SOLUTION INTRAVENOUS; SUBCUTANEOUS at 09:24

## 2021-08-23 RX ADMIN — BARICITINIB 4 MG: 2 TABLET, FILM COATED ORAL at 08:49

## 2021-08-23 RX ADMIN — IPRATROPIUM BROMIDE AND ALBUTEROL 1 PUFF: 20; 100 SPRAY, METERED RESPIRATORY (INHALATION) at 12:14

## 2021-08-23 RX ADMIN — INSULIN LISPRO 9 UNITS: 100 INJECTION, SOLUTION INTRAVENOUS; SUBCUTANEOUS at 21:23

## 2021-08-23 RX ADMIN — APIXABAN 5 MG: 5 TABLET, FILM COATED ORAL at 08:49

## 2021-08-23 RX ADMIN — LISINOPRIL AND HYDROCHLOROTHIAZIDE 1 TABLET: 12.5; 2 TABLET ORAL at 08:49

## 2021-08-23 RX ADMIN — METHYLPREDNISOLONE SODIUM SUCCINATE 40 MG: 40 INJECTION, POWDER, FOR SOLUTION INTRAMUSCULAR; INTRAVENOUS at 17:30

## 2021-08-23 RX ADMIN — APIXABAN 5 MG: 5 TABLET, FILM COATED ORAL at 21:22

## 2021-08-23 RX ADMIN — INSULIN LISPRO 15 UNITS: 100 INJECTION, SOLUTION INTRAVENOUS; SUBCUTANEOUS at 12:29

## 2021-08-23 RX ADMIN — LEVOFLOXACIN 500 MG: 5 INJECTION, SOLUTION INTRAVENOUS at 23:14

## 2021-08-23 RX ADMIN — INSULIN GLARGINE 25 UNITS: 100 INJECTION, SOLUTION SUBCUTANEOUS at 21:23

## 2021-08-23 RX ADMIN — ACETAMINOPHEN 650 MG: 325 TABLET ORAL at 21:21

## 2021-08-23 RX ADMIN — METHYLPREDNISOLONE SODIUM SUCCINATE 40 MG: 40 INJECTION, POWDER, FOR SOLUTION INTRAMUSCULAR; INTRAVENOUS at 05:06

## 2021-08-23 RX ADMIN — ALLOPURINOL 300 MG: 300 TABLET ORAL at 08:49

## 2021-08-23 RX ADMIN — IPRATROPIUM BROMIDE AND ALBUTEROL 1 PUFF: 20; 100 SPRAY, METERED RESPIRATORY (INHALATION) at 08:15

## 2021-08-23 RX ADMIN — NYSTATIN 500000 UNITS: 100000 SUSPENSION ORAL at 08:50

## 2021-08-23 ASSESSMENT — PAIN SCALES - GENERAL
PAINLEVEL_OUTOF10: 0
PAINLEVEL_OUTOF10: 0
PAINLEVEL_OUTOF10: 3
PAINLEVEL_OUTOF10: 0
PAINLEVEL_OUTOF10: 0
PAINLEVEL_OUTOF10: 2
PAINLEVEL_OUTOF10: 0

## 2021-08-23 ASSESSMENT — PAIN DESCRIPTION - PROGRESSION: CLINICAL_PROGRESSION: NOT CHANGED

## 2021-08-23 ASSESSMENT — PAIN DESCRIPTION - ORIENTATION: ORIENTATION: MID

## 2021-08-23 ASSESSMENT — PAIN DESCRIPTION - LOCATION: LOCATION: GENERALIZED

## 2021-08-23 ASSESSMENT — PAIN DESCRIPTION - FREQUENCY: FREQUENCY: CONTINUOUS

## 2021-08-23 ASSESSMENT — PAIN DESCRIPTION - ONSET: ONSET: ON-GOING

## 2021-08-23 ASSESSMENT — PAIN DESCRIPTION - DESCRIPTORS: DESCRIPTORS: ACHING

## 2021-08-23 ASSESSMENT — PAIN DESCRIPTION - PAIN TYPE: TYPE: ACUTE PAIN

## 2021-08-23 ASSESSMENT — PAIN SCALES - WONG BAKER: WONGBAKER_NUMERICALRESPONSE: 0

## 2021-08-23 ASSESSMENT — PAIN - FUNCTIONAL ASSESSMENT: PAIN_FUNCTIONAL_ASSESSMENT: ACTIVITIES ARE NOT PREVENTED

## 2021-08-23 NOTE — PROGRESS NOTES
Patient here with COVID Pneumonia. Levaquin given last night in left forearm. x1 to [facility]. 10L high flow. Patient tolerating well. No adverse events throughout the night.

## 2021-08-23 NOTE — CARE COORDINATION
Patient now requiring 9 LO2. Patient up independently in room. Possible need for home oxygen. SW to follow and assist as needed.   OMAR Marshall, DANDREW, Social Work/Case Management   [phone]  Electronically signed by OMAR Marshall, CHAPARRITA on 8/23/2021 at 11:42 AM

## 2021-08-23 NOTE — PROGRESS NOTES
Hospitalist Progress Note      PCP: Nelsy Beal MD    Date of Admission: 8/17/2021    Chief Complaint: shortness of breath, cough, fatigue    Hospital Course: The patient is a 69 y.o. female with hx HTN, obesity, HTN, anxiety/depression who presents to [facility] with shortness of breath, cough, and fatigue. Her symptoms began about 5 days ago and have been constant. Appetite has been poor and patient states she has no energy. Denies fever, chills, chest pain, abdominal pain, nausea, vomiting, constipation, diarrhea, and dysuria.     In the ED, labs were significant for a sodium of 128, chloride of 91, pro-BNP of 1,769, glucose of 377. She tested positive for COVID-19. CXR showed left mid and bibasilar infiltrates consistent with pneumonia. CT Chest without contrast showed moderate multifocal opacity throughout all lobes bilaterally, trace pleural effusions, mild mediastinal adenopathy, bilateral adrenal adenomas, and mild mural thickening of the distal esophagus. Subjective: Pt seen and examined. She feels down. Oxygen better, 13L -> 11L -> 9L.     Medications:  Reviewed    Infusion Medications    dextrose       Scheduled Medications    insulin glargine  25 Units Subcutaneous Nightly    insulin lispro  15 Units Subcutaneous TID WC    levofloxacin  500 mg Intravenous Q24H    metoprolol tartrate  25 mg Oral BID    apixaban  5 mg Oral BID    albuterol-ipratropium  1 puff Inhalation Q4H WA    methylPREDNISolone sodium  40 mg Intravenous Q12H    nystatin  5 mL Oral 4x Daily    insulin lispro  0-18 Units Subcutaneous TID WC    insulin lispro  0-9 Units Subcutaneous Nightly    baricitinib  4 mg Oral Daily    allopurinol  300 mg Oral Daily    lisinopril-hydroCHLOROthiazide  1 tablet Oral Daily    pantoprazole  40 mg Oral QAM AC     PRN Meds: sodium chloride, glucose, dextrose, glucagon (rDNA), dextrose, perflutren lipid microspheres, acetaminophen, ALPRAZolam, HYDROcodone-acetaminophen      Intake/Output Summary (Last 24 hours) at 8/23/2021 1036  Last data filed at 8/23/2021 0029  Gross per 24 hour   Intake 160 ml   Output --   Net 160 ml       Exam:    BP (!) 154/64   Pulse 81   Temp 97.9 °F (36.6 °C) (Oral)   Resp 22   Ht 5' 5\" (1.651 m)   Wt 246 lb 14.6 oz (112 kg)   SpO2 93%   BMI 41.09 kg/m²     General appearance: Ill appearing. HEENT Thrush apparent. Normal cephalic, atraumatic without obvious deformity. Pupils equal, round, and reactive to light. Extra ocular muscles intact. Conjunctivae/corneas clear. Neck: Supple, No jugular venous distention/bruits. Trachea midline without thyromegaly or adenopathy with full range of motion. Lungs: Increased work of breathing, accessory muscle use, lungs clear to auscultation. Heart: Regular rate and rhythm with Normal S1/S2 without murmurs, rubs or gallops, point of maximum impulse non-displaced  Abdomen: Obese, soft, non-tender or non-distended without rigidity or guarding and positive bowel sounds all four quadrants. Extremities: No clubbing, cyanosis, or edema bilaterally. Full range of motion without deformity and normal gait intact. Skin: Skin color, texture, turgor normal.  No rashes or lesions. Neurologic: Alert and oriented X 3, neurovascularly intact with sensory/motor intact upper extremities/lower extremities, bilaterally. Cranial nerves: II-XII intact, grossly non-focal.  Mental status: Alert, oriented, thought content appropriate.   Capillary Refill: Acceptable  < 3 seconds  Peripheral Pulses: +3 Easily felt, not easily obliterated with pressure      Labs:   Recent Labs     08/21/21  0659 08/22/21  0551 08/23/21  0609   WBC 8.2 11.3* 15.1*   HGB 14.0 13.8 14.0   HCT 43.0 41.5 41.7    379 442     Recent Labs     08/21/21  0659 08/22/21  0551 08/23/21  0609    137 138   K 4.9 5.2* 4.8   CL 99 98* 98*   CO2 23 27 29   BUN 45* 51* 57*   CREATININE 1.0 1.2 1.2   CALCIUM 9.9 9.9 9.6 Recent Labs     08/21/21  0659 08/22/21  0551 08/23/21  0609   AST 15 14* 10*   ALT 13 12 11   BILITOT 0.3 0.3 0.3   ALKPHOS 73 68 65     No results for input(s): INR in the last 72 hours. No results for input(s): Zettie Binet in the last 72 hours. Urinalysis:      Lab Results   Component Value Date    NITRU Negative 08/17/2021    WBCUA 6 08/17/2021    RBCUA 2 08/17/2021    BLOODU Negative 08/17/2021    SPECGRAV 1.014 08/17/2021    GLUCOSEU >=1000 08/17/2021    GLUCOSEU >=1000 06/04/2010       Radiology:  CT CHEST WO CONTRAST   Final Result   Moderate multifocal opacity throughout all lobes bilaterally, consistent with   viral pneumonitis in this patient with history of COVID-19 infection. Trace pleural effusions. Mild mediastinal adenopathy, likely reactive. Bilateral adrenal adenomas. Mild mural thickening of the distal esophagus; correlate for esophagitis. XR CHEST PORTABLE   Final Result   Left mid and bibasilar infiltrates consistent with pneumonia.                  Assessment/Plan:    Active Hospital Problems    Diagnosis Date Noted    Community acquired pneumonia of left lower lobe of lung [J18.9]     Acute respiratory failure with hypoxemia ([address] [J96.01]     COVID-19 [U07.1]     Acute respiratory failure (HCC) [J96.00] 08/17/2021       Pneumonia due to COVID-19 infection - improving  -IV Solumedrol  -Remdesivir x 10 days  -baricitnib x 14 days  -vitamin D supplementation  -empiric IV Vanc and Levaquin to cover for possible superimposed bacterial infection  -MRSA swab negative, stopped Vanc  -PPE  -supportive care  -contact plus isolation  -Lovenox BID-> Eliquis with Afib noted  -Combivent QID  -trend inflammatory markers     Remdesivir Initiation Note     This patient meets criteria for initiation of remdesivir based on the following:  · Proven COVID-19  · Moderate disease (Requiring supplemental O2)  · Acceptable hepatic function (ALT within 5 times ULN)     Exclusion Criteria:  · Severe disease requiring invasive or non-invasive mechanical ventilation (includes HFNC & BiPAP)  · Could consider use in patients requiring high flow if early on in the disease course (based on symptom duration)  · Use of more than 1 vasopressor prior to remdesivir initiation  · Already improving on supportive treatment and/or impending discharge  · Patients in whom the clinical team think death is in the immediate short-term where remdesivir is unlikely to change the clinical outcome     Liver function tests will be monitored daily while on remdesivir. Atrial fibrillation with rapid ventricular response  -cardiology consulted, recs appreciated  -PO Lopressor 25 mg BID  -PO Eliquis 5 mg BID  -tele monitoring     Acute respiratory failure with hypoxia - due to COVID-19 infection, improving  -management as above  -wean oxygen to maintain SpO2 > 89%     Type 2 DM - uncontrolled  -Lantus adjusted  -Humalog adjusted  -SSI  -hypoglycemia protocol  -POCT glucose checks     Anxiety and depression  -continue home meds     Essential hypertension  -continue home meds     Obesity - BMI 39.71  -nutritional counseling provided  -weight loss encouraged  -complicating medical management      DVT Prophylaxis: Eliquis  Diet: ADULT DIET;  Regular; 4 carb choices (60 gm/meal)  Adult Oral Nutrition Supplement; Standard High Calorie/High Protein Oral Supplement  Code Status: Full Code    PT/OT Eval Status: defer    Dispo - continue care    Emiliano Mcbride MD

## 2021-08-23 NOTE — PLAN OF CARE
Problem: Falls - Risk of:  Goal: Will remain free from falls  Description: Will remain free from falls  Outcome: Ongoing  Goal: Absence of physical injury  Description: Absence of physical injury  Outcome: Ongoing  Bed alarm on, bed in lowest position, wheels locked. Fall risk assessment completed every shift. Nonskid socks on, fall sign posted. Pt educated on use of call light. Problem: Airway Clearance - Ineffective  Goal: Achieve or maintain patent airway  Outcome: Ongoing     Problem: Gas Exchange - Impaired  Goal: Absence of hypoxia  Outcome: Ongoing  Goal: Promote optimal lung function  Outcome: Ongoing     Problem: Breathing Pattern - Ineffective  Goal: Ability to achieve and maintain a regular respiratory rate  Outcome: Ongoing  respiratory assessment completed q shift and PRN. RR and Sp02 assessed q4 hours. collaborating with RT to maintain adequate oxygenation and sp02. Encouraging incentive spirometry. Problem: Body Temperature -  Risk of, Imbalanced  Goal: Ability to maintain a body temperature within defined limits  Outcome: Ongoing  Goal: Will regain or maintain usual level of consciousness  Outcome: Ongoing  Goal: Complications related to the disease process, condition or treatment will be avoided or minimized  Outcome: Ongoing     Problem: Isolation Precautions - Risk of Spread of Infection  Goal: Prevent transmission of infection  Outcome: Ongoing     Problem: Nutrition Deficits  Goal: Optimize nutritional status  Outcome: Ongoing  Encouraging pt to eat at least 50% of all meals. Assisting with feeding when needed. collaborating with dietician for optimal nutrition. Problem: Risk for Fluid Volume Deficit  Goal: Maintain normal heart rhythm  Outcome: Ongoing  Goal: Maintain absence of muscle cramping  Outcome: Ongoing  Goal: Maintain normal serum potassium, sodium, calcium, phosphorus, and pH  Outcome: Ongoing  Encouraged pt to drink prescribed amount of fluid per day.  Monitoring and assessing lab values every shift, Notifying physician if abnormal. Assessing skin turgor and mucous membranes. Problem: Loneliness or Risk for Loneliness  Goal: Demonstrate positive use of time alone when socialization is not possible  Outcome: Ongoing     Problem: Fatigue  Goal: Verbalize increase energy and improved vitality  Outcome: Ongoing     Problem: Patient Education: Go to Patient Education Activity  Goal: Patient/Family Education  Outcome: Ongoing     Problem: Pain:  Goal: Pain level will decrease  Description: Pain level will decrease  Outcome: Ongoing  Goal: Control of acute pain  Description: Control of acute pain  Outcome: Ongoing  Goal: Control of chronic pain  Description: Control of chronic pain  Outcome: Ongoing  Assessing pain using appropriate pain rating scale q shift and PRN. Medicating pt as prescribed and indicated. Offering pt non-pharmacologic pain interventions. Reassessing pain and pts response to pain intervention.

## 2021-08-23 NOTE — PROGRESS NOTES
Pulmonary Progress Note    Date of Admission: 8/17/2021   LOS: 6 days       CC:  COVID-19    Subjective:  No change. Currently requiring 8 L nasal cannula. ROS:   No nausea  No Vomiting  No chest pain      Assessment:          Plan:        Hospital Day: 6     Acute hypoxic respiratory failure  Wean oxygen to 90%. Currently requiring 9 L nasal cannula. COVID 19 pneumonia  Solu-Medrol, baricitinib. Remdesivir completed  Combivent   Encourage self-proning     Multifocal bacterial pneumonia  Procalcitonin improving. Plan for Levaquin x5 days.     BMI 40   DVT prophylaxis with eliquis       Data:        PHYSICAL EXAM:   Blood pressure (!) 154/64, pulse 81, temperature 97.9 °F (36.6 °C), temperature source Oral, resp. rate 22, height 5' 5\" (1.651 m), weight 246 lb 14.6 oz (112 kg), SpO2 94 %, not currently breastfeeding.'  Body mass index is 41.09 kg/m². Gen: No distress. ENT:   Resp: No accessory muscle use. No crackles. No wheezes. No rhonchi. CV: Regular rate. Regular rhythm. No murmur or rub. No edema. Skin: Warm, dry, normal texture and turgor. No nodule on exposed extremities. M/S: No cyanosis. No clubbing. No joint deformity. Psych: Oriented x 3. No anxiety. Awake. Alert. Intact judgement and insight. Good Mood / Affect.   Memory appears in tact       Medications:    Scheduled Meds:   insulin glargine  25 Units Subcutaneous Nightly    insulin lispro  15 Units Subcutaneous TID     levofloxacin  500 mg Intravenous Q24H    metoprolol tartrate  25 mg Oral BID    apixaban  5 mg Oral BID    albuterol-ipratropium  1 puff Inhalation Q4H WA    methylPREDNISolone sodium  40 mg Intravenous Q12H    nystatin  5 mL Oral 4x Daily    insulin lispro  0-18 Units Subcutaneous TID WC    insulin lispro  0-9 Units Subcutaneous Nightly    baricitinib  4 mg Oral Daily    allopurinol  300 mg Oral Daily    lisinopril-hydroCHLOROthiazide  1 tablet Oral Daily    pantoprazole  40 mg Oral QAM AC Continuous Infusions:   dextrose         PRN Meds:  sodium chloride, glucose, dextrose, glucagon (rDNA), dextrose, perflutren lipid microspheres, acetaminophen, ALPRAZolam, HYDROcodone-acetaminophen    Labs reviewed:  CBC:   Recent Labs     08/21/21  0659 08/22/21  0551 08/23/21  0609   WBC 8.2 11.3* 15.1*   HGB 14.0 13.8 14.0   HCT 43.0 41.5 41.7   MCV 94.5 93.2 92.3    379 442     BMP:   Recent Labs     08/21/21  0659 08/22/21  0551 08/23/21  0609    137 138   K 4.9 5.2* 4.8   CL 99 98* 98*   CO2 23 27 29   BUN 45* 51* 57*   CREATININE 1.0 1.2 1.2     LIVER PROFILE:   Recent Labs     08/21/21  0659 08/22/21  0551 08/23/21  0609   AST 15 14* 10*   ALT 13 12 11   BILITOT 0.3 0.3 0.3   ALKPHOS 73 68 65     PT/INR: No results for input(s): PROTIME, INR in the last 72 hours. APTT: No results for input(s): APTT in the last 72 hours. UA:No results for input(s): NITRITE, COLORU, PHUR, LABCAST, WBCUA, RBCUA, MUCUS, TRICHOMONAS, YEAST, BACTERIA, CLARITYU, SPECGRAV, LEUKOCYTESUR, UROBILINOGEN, BILIRUBINUR, BLOODU, GLUCOSEU, AMORPHOUS in the last 72 hours. Invalid input(s): Deepak Tejada  No results for input(s): PH, PCO2, PO2 in the last 72 hours. Cx:      Films: This note was transcribed using [id number] [employer]. Please disregard any translational errors.       Matthew Abarca Pulmonary, Sleep and [facility] [phone]

## 2021-08-24 LAB
A/G RATIO: 1.4 (ref 1.1–2.2)
ALBUMIN SERPL-MCNC: 3.2 G/DL (ref 3.4–5)
ALP BLD-CCNC: 59 U/L (ref 40–129)
ALT SERPL-CCNC: 12 U/L (ref 10–40)
ANION GAP SERPL CALCULATED.3IONS-SCNC: 12 MMOL/L (ref 3–16)
AST SERPL-CCNC: 10 U/L (ref 15–37)
BANDED NEUTROPHILS RELATIVE PERCENT: 1 % (ref 0–7)
BASOPHILS ABSOLUTE: 0 K/UL (ref 0–0.2)
BASOPHILS RELATIVE PERCENT: 0 %
BILIRUB SERPL-MCNC: 0.3 MG/DL (ref 0–1)
BUN BLDV-MCNC: 80 MG/DL (ref 7–20)
C-REACTIVE PROTEIN: 8.6 MG/L (ref 0–5.1)
CALCIUM SERPL-MCNC: 9.2 MG/DL (ref 8.3–10.6)
CHLORIDE BLD-SCNC: 96 MMOL/L (ref 99–110)
CO2: 26 MMOL/L (ref 21–32)
CREAT SERPL-MCNC: 1.6 MG/DL (ref 0.6–1.2)
D DIMER: 295 NG/ML DDU (ref 0–229)
EOSINOPHILS ABSOLUTE: 0 K/UL (ref 0–0.6)
EOSINOPHILS RELATIVE PERCENT: 0 %
FERRITIN: 616.6 NG/ML (ref 15–150)
GFR AFRICAN AMERICAN: 38
GFR NON-AFRICAN AMERICAN: 32
GLOBULIN: 2.3 G/DL
GLUCOSE BLD-MCNC: 280 MG/DL (ref 70–99)
GLUCOSE BLD-MCNC: 332 MG/DL (ref 70–99)
GLUCOSE BLD-MCNC: 348 MG/DL (ref 70–99)
GLUCOSE BLD-MCNC: 86 MG/DL (ref 70–99)
GLUCOSE BLD-MCNC: 95 MG/DL (ref 70–99)
HCT VFR BLD CALC: 41.5 % (ref 36–48)
HEMOGLOBIN: 13.7 G/DL (ref 12–16)
LYMPHOCYTES ABSOLUTE: 1.4 K/UL (ref 1–5.1)
LYMPHOCYTES RELATIVE PERCENT: 8 %
MCH RBC QN AUTO: 30.8 PG (ref 26–34)
MCHC RBC AUTO-ENTMCNC: 33.1 G/DL (ref 31–36)
MCV RBC AUTO: 93 FL (ref 80–100)
METAMYELOCYTES RELATIVE PERCENT: 3 %
MONOCYTES ABSOLUTE: 0.5 K/UL (ref 0–1.3)
MONOCYTES RELATIVE PERCENT: 3 %
MYELOCYTE PERCENT: 4 %
NEUTROPHILS ABSOLUTE: 16.1 K/UL (ref 1.7–7.7)
NEUTROPHILS RELATIVE PERCENT: 81 %
PDW BLD-RTO: 13.7 % (ref 12.4–15.4)
PERFORMED ON: ABNORMAL
PERFORMED ON: NORMAL
PLATELET # BLD: 518 K/UL (ref 135–450)
PLATELET SLIDE REVIEW: ABNORMAL
PMV BLD AUTO: 8.4 FL (ref 5–10.5)
POTASSIUM REFLEX MAGNESIUM: 4.6 MMOL/L (ref 3.5–5.1)
PROCALCITONIN: 0.13 NG/ML (ref 0–0.15)
RBC # BLD: 4.47 M/UL (ref 4–5.2)
RBC # BLD: NORMAL 10*6/UL
SLIDE REVIEW: ABNORMAL
SODIUM BLD-SCNC: 134 MMOL/L (ref 136–145)
TOTAL PROTEIN: 5.5 G/DL (ref 6.4–8.2)
WBC # BLD: 18.1 K/UL (ref 4–11)

## 2021-08-24 PROCEDURE — 2500000003 HC RX 250 WO HCPCS: Performed by: INTERNAL MEDICINE

## 2021-08-24 PROCEDURE — 36415 COLL VENOUS BLD VENIPUNCTURE: CPT

## 2021-08-24 PROCEDURE — 2700000000 HC OXYGEN THERAPY PER DAY

## 2021-08-24 PROCEDURE — 6370000000 HC RX 637 (ALT 250 FOR IP): Performed by: INTERNAL MEDICINE

## 2021-08-24 PROCEDURE — 6360000002 HC RX W HCPCS: Performed by: INTERNAL MEDICINE

## 2021-08-24 PROCEDURE — 80053 COMPREHEN METABOLIC PANEL: CPT

## 2021-08-24 PROCEDURE — 85025 COMPLETE CBC W/AUTO DIFF WBC: CPT

## 2021-08-24 PROCEDURE — 2060000000 HC ICU INTERMEDIATE R&B

## 2021-08-24 PROCEDURE — 84145 PROCALCITONIN (PCT): CPT

## 2021-08-24 PROCEDURE — 85379 FIBRIN DEGRADATION QUANT: CPT

## 2021-08-24 PROCEDURE — 94761 N-INVAS EAR/PLS OXIMETRY MLT: CPT

## 2021-08-24 PROCEDURE — 94669 MECHANICAL CHEST WALL OSCILL: CPT

## 2021-08-24 PROCEDURE — 99232 SBSQ HOSP IP/OBS MODERATE 35: CPT | Performed by: NURSE PRACTITIONER

## 2021-08-24 PROCEDURE — 86140 C-REACTIVE PROTEIN: CPT

## 2021-08-24 PROCEDURE — 82728 ASSAY OF FERRITIN: CPT

## 2021-08-24 PROCEDURE — 94640 AIRWAY INHALATION TREATMENT: CPT

## 2021-08-24 RX ADMIN — INSULIN LISPRO 12 UNITS: 100 INJECTION, SOLUTION INTRAVENOUS; SUBCUTANEOUS at 12:45

## 2021-08-24 RX ADMIN — APIXABAN 5 MG: 5 TABLET, FILM COATED ORAL at 08:54

## 2021-08-24 RX ADMIN — INSULIN LISPRO 9 UNITS: 100 INJECTION, SOLUTION INTRAVENOUS; SUBCUTANEOUS at 18:09

## 2021-08-24 RX ADMIN — METHYLPREDNISOLONE SODIUM SUCCINATE 40 MG: 40 INJECTION, POWDER, FOR SOLUTION INTRAMUSCULAR; INTRAVENOUS at 18:00

## 2021-08-24 RX ADMIN — NYSTATIN 500000 UNITS: 100000 SUSPENSION ORAL at 18:00

## 2021-08-24 RX ADMIN — IPRATROPIUM BROMIDE AND ALBUTEROL 1 PUFF: 20; 100 SPRAY, METERED RESPIRATORY (INHALATION) at 20:31

## 2021-08-24 RX ADMIN — NYSTATIN 500000 UNITS: 100000 SUSPENSION ORAL at 21:56

## 2021-08-24 RX ADMIN — PANTOPRAZOLE SODIUM 40 MG: 40 TABLET, DELAYED RELEASE ORAL at 06:49

## 2021-08-24 RX ADMIN — INSULIN LISPRO 18 UNITS: 100 INJECTION, SOLUTION INTRAVENOUS; SUBCUTANEOUS at 12:46

## 2021-08-24 RX ADMIN — INSULIN LISPRO 18 UNITS: 100 INJECTION, SOLUTION INTRAVENOUS; SUBCUTANEOUS at 08:51

## 2021-08-24 RX ADMIN — METHYLPREDNISOLONE SODIUM SUCCINATE 40 MG: 40 INJECTION, POWDER, FOR SOLUTION INTRAMUSCULAR; INTRAVENOUS at 06:49

## 2021-08-24 RX ADMIN — ACETAMINOPHEN 650 MG: 325 TABLET ORAL at 18:01

## 2021-08-24 RX ADMIN — INSULIN LISPRO 18 UNITS: 100 INJECTION, SOLUTION INTRAVENOUS; SUBCUTANEOUS at 18:09

## 2021-08-24 RX ADMIN — ALLOPURINOL 300 MG: 300 TABLET ORAL at 08:53

## 2021-08-24 RX ADMIN — METOPROLOL TARTRATE 25 MG: 25 TABLET, FILM COATED ORAL at 08:53

## 2021-08-24 RX ADMIN — INSULIN LISPRO 6 UNITS: 100 INJECTION, SOLUTION INTRAVENOUS; SUBCUTANEOUS at 21:57

## 2021-08-24 RX ADMIN — NYSTATIN 500000 UNITS: 100000 SUSPENSION ORAL at 12:47

## 2021-08-24 RX ADMIN — BARICITINIB 4 MG: 2 TABLET, FILM COATED ORAL at 08:54

## 2021-08-24 RX ADMIN — APIXABAN 5 MG: 5 TABLET, FILM COATED ORAL at 21:56

## 2021-08-24 RX ADMIN — IPRATROPIUM BROMIDE AND ALBUTEROL 1 PUFF: 20; 100 SPRAY, METERED RESPIRATORY (INHALATION) at 08:40

## 2021-08-24 RX ADMIN — METOPROLOL TARTRATE 25 MG: 25 TABLET, FILM COATED ORAL at 21:56

## 2021-08-24 RX ADMIN — IPRATROPIUM BROMIDE AND ALBUTEROL 1 PUFF: 20; 100 SPRAY, METERED RESPIRATORY (INHALATION) at 17:12

## 2021-08-24 RX ADMIN — LISINOPRIL AND HYDROCHLOROTHIAZIDE 1 TABLET: 12.5; 2 TABLET ORAL at 08:53

## 2021-08-24 RX ADMIN — INSULIN GLARGINE 25 UNITS: 100 INJECTION, SOLUTION SUBCUTANEOUS at 21:57

## 2021-08-24 RX ADMIN — IPRATROPIUM BROMIDE AND ALBUTEROL 1 PUFF: 20; 100 SPRAY, METERED RESPIRATORY (INHALATION) at 13:08

## 2021-08-24 ASSESSMENT — PAIN SCALES - GENERAL
PAINLEVEL_OUTOF10: 0
PAINLEVEL_OUTOF10: 4

## 2021-08-24 ASSESSMENT — PAIN - FUNCTIONAL ASSESSMENT: PAIN_FUNCTIONAL_ASSESSMENT: ACTIVITIES ARE NOT PREVENTED

## 2021-08-24 ASSESSMENT — PAIN DESCRIPTION - PAIN TYPE: TYPE: ACUTE PAIN

## 2021-08-24 ASSESSMENT — PAIN DESCRIPTION - LOCATION: LOCATION: ABDOMEN;BACK

## 2021-08-24 ASSESSMENT — PAIN DESCRIPTION - DESCRIPTORS: DESCRIPTORS: ACHING

## 2021-08-24 ASSESSMENT — PAIN DESCRIPTION - ONSET: ONSET: GRADUAL

## 2021-08-24 ASSESSMENT — PAIN DESCRIPTION - FREQUENCY: FREQUENCY: INTERMITTENT

## 2021-08-24 ASSESSMENT — PAIN DESCRIPTION - PROGRESSION: CLINICAL_PROGRESSION: NOT CHANGED

## 2021-08-24 NOTE — PROGRESS NOTES
Pulmonary/Critical Care Progress Note    CC:  Follow-up:  Acute hypoxic respiratory failure  COVID 19 pneumonia  Multifocal bacterial pneumonia  BMI 40    Subjective: Weaned to 5L NC  Feeling much better  Working with breathing exercises and PTOT  Not much coughing    ROS  No cough  LÓPEZ improved  No fever    Intake/Output Summary (Last 24 hours) at 8/24/2021 1234  Last data filed at 8/24/2021 0018  Gross per 24 hour   Intake 1066.67 ml   Output 800 ml   Net 266.67 ml         PHYSICAL EXAM:  Blood pressure 122/82, pulse 105, temperature 97.5 °F (36.4 °C), temperature source Oral, resp. rate 18, height 5' 5\" (1.651 m), weight 241 lb 10 oz (109.6 kg), SpO2 95 %, not currently breastfeeding.'  Gen: No distress. Well developed, well-nourished  Eyes: No scleral icterus. No conjunctival injection. ENT: External appearance of ears and nose normal.  Neck: Trachea midline. No obvious mass. No visible thyroid enlargement     Respiratory: No crackles. No wheezes. No rhonchi. Diminished in bases but clearing  Cardiovascular: Regular rate. Regular rhythm. No murmur or rub. Edema in bilateral ankles about the same  GI: Non-tender. Non-distended. No hernia. Bowel sounds present  Skin: Warm, dry, normal texture and turgor. No abnormalities on exposed extremities. Musculoskeletal: No cyanosis. No clubbing. No joint deformity. Neuro: Moves all four extremities.    Psychiatric:  Normal mood and affect; exhibits normal insight and judgement     Medications:    Scheduled Meds:   insulin glargine  25 Units Subcutaneous Nightly    insulin lispro  18 Units Subcutaneous TID     metoprolol tartrate  25 mg Oral BID    apixaban  5 mg Oral BID    albuterol-ipratropium  1 puff Inhalation Q4H WA    methylPREDNISolone sodium  40 mg IntraVENous Q12H    nystatin  5 mL Oral 4x Daily    insulin lispro  0-18 Units Subcutaneous TID     insulin lispro  0-9 Units Subcutaneous Nightly    baricitinib  4 mg Oral Daily    allopurinol  300 mg Oral Daily    lisinopril-hydroCHLOROthiazide  1 tablet Oral Daily    pantoprazole  40 mg Oral QAM AC       Continuous Infusions:   dextrose         PRN Meds:  sodium chloride, glucose, dextrose, glucagon (rDNA), dextrose, perflutren lipid microspheres, acetaminophen, ALPRAZolam, HYDROcodone-acetaminophen    Labs:  CBC:   Recent Labs     08/22/21  0551 08/23/21  0609 08/24/21  0633   WBC 11.3* 15.1* 18.1*   HGB 13.8 14.0 13.7   HCT 41.5 41.7 41.5   MCV 93.2 92.3 93.0    442 518*     BMP:   Recent Labs     08/22/21  0551 08/23/21  0609 08/24/21  0633    138 134*   K 5.2* 4.8 4.6   CL 98* 98* 96*   CO2 27 29 26   BUN 51* 57* 80*   CREATININE 1.2 1.2 1.6*     LIVER PROFILE:   Recent Labs     08/22/21  0551 08/23/21  0609 08/24/21  0633   AST 14* 10* 10*   ALT 12 11 12   BILITOT 0.3 0.3 0.3   ALKPHOS 68 65 59     PT/INR: No results for input(s): PROTIME, INR in the last 72 hours. APTT: No results for input(s): APTT in the last 72 hours. UA:No results for input(s): NITRITE, COLORU, PHUR, LABCAST, WBCUA, RBCUA, MUCUS, TRICHOMONAS, YEAST, BACTERIA, CLARITYU, SPECGRAV, LEUKOCYTESUR, UROBILINOGEN, BILIRUBINUR, BLOODU, GLUCOSEU, AMORPHOUS in the last 72 hours. Invalid input(s): Vonzella Birch  No results for input(s): PH, PCO2, PO2 in the last 72 hours. Films:  Chest imaging and associated reports were personally reviewed and showed 8/18: Moderate multifocal opacity throughout all lobes bilaterally, consistent with   viral pneumonitis in this patient with history of COVID-19 infection.       Trace pleural effusions.  Mild mediastinal adenopathy, likely reactive.       Bilateral adrenal adenomas.       Mild mural thickening of the distal esophagus; correlate for esophagitis. ABG:  No study    Cultures:  Blood: no growth  MRSA probe: negative  COVID19: positive    ECHO  2021   Summary   Left ventricular cavity size is normal.   Normal left ventricular wall thickness.    Ejection fraction is visually estimated to be 50-55%. No regional wall motion abnormalities are noted. Indeterminate diastolic function. Normal right ventricular size and function. Right ventricular systolic function is normal.   Mild tricuspid regurgitation. PFTs  None    Assessment/Plan:     Acute hypoxic respiratory failure  Maintain oxygen saturations >90% with supplemental oxygen and wean as tolerated  Encourage self-proning  Bronchodilators    COVID 19 pneumonia  Remdesivir and baricitinib  Solumedrol  Combivent   Encourage self-proning as tolerated    Multifocal bacterial pneumonia  Completed levaquin  Acapella and IS    BMI 40     DVT prophylaxis with eliquis    Thank you for allowing me to participate in the care of this patient. Will follow.     Lynnette Marte, ACNP  [facility] Pulmonary, Sleep, and Critical Care

## 2021-08-24 NOTE — PLAN OF CARE
Problem: Falls - Risk of:  Goal: Will remain free from falls  Description: Will remain free from falls  8/24/2021 1000 by Alban Salazar RN  Outcome: Ongoing  Note: Fall risk assessment completed per unit protocol. Patient's bed is in the lowest position, call light is within reach and the patient's room is free of clutter. The patient has been instructed to call for assistance before getting out of bed or the chair. 8/24/2021 0048 by Texie Sacks, RN  Outcome: Ongoing  Goal: Absence of physical injury  Description: Absence of physical injury  8/24/2021 1000 by Alban Salazar RN  Outcome: Ongoing  8/24/2021 0048 by Texie Sacks, RN  Outcome: Ongoing     Problem: Airway Clearance - Ineffective  Goal: Achieve or maintain patent airway  8/24/2021 1000 by Alban Salazar RN  Outcome: Ongoing  8/24/2021 0048 by Texie Sacks, RN  Outcome: Ongoing     Problem: Gas Exchange - Impaired  Goal: Absence of hypoxia  8/24/2021 1000 by Alban Salazar RN  Outcome: Ongoing  8/24/2021 0048 by Texie Sacks, RN  Outcome: Ongoing  Goal: Promote optimal lung function  8/24/2021 1000 by Alban Salazar RN  Outcome: Ongoing  8/24/2021 0048 by Texie Sacks, RN  Outcome: Ongoing     Problem: Breathing Pattern - Ineffective  Goal: Ability to achieve and maintain a regular respiratory rate  8/24/2021 1000 by Alban Salazar RN  Outcome: Ongoing  8/24/2021 0048 by Texie Sacks, RN  Outcome: Ongoing     Problem:  Body Temperature -  Risk of, Imbalanced  Goal: Ability to maintain a body temperature within defined limits  8/24/2021 1000 by Alban Salazar RN  Outcome: Ongoing  8/24/2021 0048 by Texie Sacks, RN  Outcome: Ongoing  Goal: Will regain or maintain usual level of consciousness  8/24/2021 1000 by Alban Salazar RN  Outcome: Ongoing  8/24/2021 0048 by Texie Sacks, RN  Outcome: Ongoing  Goal: Complications related to the disease process, condition or treatment will be avoided or minimized  8/24/2021 1000 by Alban Salazar RN  Outcome: Ongoing  8/24/2021 0048 by Britni Miranda RN  Outcome: Ongoing     Problem: Isolation Precautions - Risk of Spread of Infection  Goal: Prevent transmission of infection  8/24/2021 1000 by Jason Hubbard RN  Outcome: Ongoing  8/24/2021 0048 by Britni Miranda RN  Outcome: Ongoing     Problem: Nutrition Deficits  Goal: Optimize nutritional status  8/24/2021 1000 by Jason Hubbard RN  Outcome: Ongoing  8/24/2021 0048 by Britni Miranda RN  Outcome: Ongoing     Problem: Risk for Fluid Volume Deficit  Goal: Maintain normal heart rhythm  8/24/2021 1000 by Jason Hubbard RN  Outcome: Ongoing  8/24/2021 0048 by Britni Miranda RN  Outcome: Ongoing  Goal: Maintain absence of muscle cramping  8/24/2021 1000 by Jason Hubbard RN  Outcome: Ongoing  8/24/2021 0048 by Britni Miranda RN  Outcome: Ongoing  Goal: Maintain normal serum potassium, sodium, calcium, phosphorus, and pH  8/24/2021 1000 by Jason Hubbard RN  Outcome: Ongoing  8/24/2021 0048 by Britni Miranda RN  Outcome: Ongoing     Problem: Loneliness or Risk for Loneliness  Goal: Demonstrate positive use of time alone when socialization is not possible  8/24/2021 1000 by Jason Hubbard RN  Outcome: Ongoing  8/24/2021 0048 by Britni Miranda RN  Outcome: Ongoing     Problem: Fatigue  Goal: Verbalize increase energy and improved vitality  8/24/2021 1000 by Jason Hubbard RN  Outcome: Ongoing  8/24/2021 0048 by Britni Miranda RN  Outcome: Ongoing     Problem: Patient Education: Go to Patient Education Activity  Goal: Patient/Family Education  8/24/2021 1000 by Jason Hubbard RN  Outcome: Ongoing  8/24/2021 0048 by Britni Miranda RN  Outcome: Ongoing     Problem: Pain:  Goal: Pain level will decrease  Description: Pain level will decrease  8/24/2021 1000 by Jason Hubbard RN  Outcome: Ongoing  8/24/2021 0048 by Britni Miranda RN  Outcome: Ongoing  Goal: Control of acute pain  Description: Control of acute pain  8/24/2021 1000 by Jason Hubbard RN  Outcome: Ongoing  8/24/2021 0048 by Tiffanie Vivar Han Pena RN  Outcome: Ongoing  Goal: Control of chronic pain  Description: Control of chronic pain  8/24/2021 1000 by Shaista Lawrence RN  Outcome: Ongoing  8/24/2021 0048 by Laverne Carlos RN  Outcome: Ongoing

## 2021-08-24 NOTE — PROGRESS NOTES
Hospitalist Progress Note      PCP: Mao Fleming MD    Date of Admission: 8/17/2021    Chief Complaint: hypoxia    Hospital Course:      Subjective: weaned to 7L NC, feeling some improvement       Medications:  Reviewed    Infusion Medications    dextrose       Scheduled Medications    insulin glargine  25 Units Subcutaneous Nightly    insulin lispro  18 Units Subcutaneous TID     metoprolol tartrate  25 mg Oral BID    apixaban  5 mg Oral BID    albuterol-ipratropium  1 puff Inhalation Q4H WA    methylPREDNISolone sodium  40 mg Intravenous Q12H    nystatin  5 mL Oral 4x Daily    insulin lispro  0-18 Units Subcutaneous TID     insulin lispro  0-9 Units Subcutaneous Nightly    baricitinib  4 mg Oral Daily    allopurinol  300 mg Oral Daily    lisinopril-hydroCHLOROthiazide  1 tablet Oral Daily    pantoprazole  40 mg Oral QAM AC     PRN Meds: sodium chloride, glucose, dextrose, glucagon (rDNA), dextrose, perflutren lipid microspheres, acetaminophen, ALPRAZolam, HYDROcodone-acetaminophen      Intake/Output Summary (Last 24 hours) at 8/24/2021 0848  Last data filed at 8/24/2021 0018  Gross per 24 hour   Intake 1066.67 ml   Output 800 ml   Net 266.67 ml       Exam:    BP (!) 93/58   Pulse 50   Temp 98 °F (36.7 °C) (Oral)   Resp 18   Ht 5' 5\" (1.651 m)   Wt 241 lb 10 oz (109.6 kg)   SpO2 95%   BMI 40.21 kg/m²     General appearance: No apparent distress, appears stated age and cooperative. HEENT: Pupils equal, round, and reactive to light. Conjunctivae/corneas clear. Neck: Supple, with full range of motion. No jugular venous distention. Trachea midline. Respiratory:  Normal respiratory effort. Clear to auscultation, bilaterally without Rales/Wheezes/Rhonchi. Cardiovascular: Regular rate and rhythm with normal S1/S2 without murmurs, rubs or gallops. Abdomen: Soft, non-tender, non-distended with normal bowel sounds. Musculoskeletal: No clubbing, cyanosis or edema bilaterally.   Full range of motion without deformity. Skin: Skin color, texture, turgor normal.  No rashes or lesions. Neurologic:  Neurovascularly intact without any focal sensory/motor deficits. Cranial nerves: II-XII intact, grossly non-focal.  Psychiatric: Alert and oriented, thought content appropriate, normal insight  Capillary Refill: Brisk,< 3 seconds   Peripheral Pulses: +2 palpable, equal bilaterally       Labs:   Recent Labs     08/22/21  0551 08/23/21  0609 08/24/21  0633   WBC 11.3* 15.1* 18.1*   HGB 13.8 14.0 13.7   HCT 41.5 41.7 41.5    442 518*     Recent Labs     08/22/21  0551 08/23/21  0609 08/24/21  0633    138 134*   K 5.2* 4.8 4.6   CL 98* 98* 96*   CO2 27 29 26   BUN 51* 57* 80*   CREATININE 1.2 1.2 1.6*   CALCIUM 9.9 9.6 9.2     Recent Labs     08/22/21  0551 08/23/21  0609 08/24/21  0633   AST 14* 10* 10*   ALT 12 11 12   BILITOT 0.3 0.3 0.3   ALKPHOS 68 65 59     No results for input(s): INR in the last 72 hours. No results for input(s): Donah Keens in the last 72 hours. Urinalysis:      Lab Results   Component Value Date    NITRU Negative 08/17/2021    WBCUA 6 08/17/2021    RBCUA 2 08/17/2021    BLOODU Negative 08/17/2021    SPECGRAV 1.014 08/17/2021    GLUCOSEU >=1000 08/17/2021    GLUCOSEU >=1000 06/04/2010       Radiology:  CT CHEST WO CONTRAST   Final Result   Moderate multifocal opacity throughout all lobes bilaterally, consistent with   viral pneumonitis in this patient with history of COVID-19 infection. Trace pleural effusions. Mild mediastinal adenopathy, likely reactive. Bilateral adrenal adenomas. Mild mural thickening of the distal esophagus; correlate for esophagitis. XR CHEST PORTABLE   Final Result   Left mid and bibasilar infiltrates consistent with pneumonia.                  Assessment/Plan:    Active Hospital Problems    Diagnosis Date Noted    Community acquired pneumonia of left lower lobe of lung [J18.9]     Acute respiratory failure with hypoxemia ([facility] [address] [J96.01]     COVID-19 [U07.1]     Acute respiratory failure ([email or web address] [J96.00] 08/17/2021       Pneumonia due to COVID-19 infection - improving  -IV Solumedrol  -Remdesivir x 10 days  -baricitnib x 14 days  -vitamin D supplementation  -empiric IV Vanc and Levaquin to cover for possible superimposed bacterial infection  -MRSA swab negative, stopped Vanc  -PPE  -supportive care  -contact plus isolation  -Lovenox BID-> Eliquis with Afib noted  -Combivent QID  -trend inflammatory markers     Remdesivir Initiation Note     This patient meets criteria for initiation of remdesivir based on the following:  · Proven COVID-19  · Moderate disease (Requiring supplemental O2)  · Acceptable hepatic function (ALT within 5 times ULN)     Exclusion Criteria:  · Severe disease requiring invasive or non-invasive mechanical ventilation (includes HFNC & BiPAP)  · Could consider use in patients requiring high flow if early on in the disease course (based on symptom duration)  · Use of more than 1 vasopressor prior to remdesivir initiation  · Already improving on supportive treatment and/or impending discharge  · Patients in whom the clinical team think death is in the immediate short-term where remdesivir is unlikely to change the clinical outcome     Liver function tests will be monitored daily while on remdesivir.     Atrial fibrillation with rapid ventricular response  -cardiology consulted, recs appreciated  -PO Lopressor 25 mg BID  -PO Eliquis 5 mg BID  -tele monitoring     Acute respiratory failure with hypoxia - due to COVID-19 infection, improving  -management as above  -wean oxygen to maintain SpO2 > 89%     Type 2 DM - uncontrolled  -Lantus adjusted  -Humalog adjusted  -SSI  -hypoglycemia protocol  -POCT glucose checks     Anxiety and depression  -continue home meds     Essential hypertension  -continue home meds     Obesity - BMI 39.71  -nutritional counseling provided  -weight loss encouraged  -complicating medical management    DVT Prophylaxis: Eliquis  Diet: ADULT DIET;  Regular; 4 carb choices (60 gm/meal)  Adult Oral Nutrition Supplement; Standard High Calorie/High Protein Oral Supplement  Code Status: Full Code    PT/OT Eval Status: ordered    Pavithra Vidal MD

## 2021-08-24 NOTE — PROGRESS NOTES
Nutrition Assessment     Type and Reason for Visit: Initial    Nutrition Recommendations/Plan:   Carb Control diet   Will modify ONS to Glucerna   Will monitor nutritional adequacy, nutrition-related labs, weights, BMs, and clinical progress     Nutrition Assessment:  LOS. Pt admitted with respiratory failure, does have Covid-19. Hx includes HTN, obesity, HTN, anxiety/depression. Currently on 6L via HFNC. Pt did reports poor appetite PTA. Currently on Carb Control (4) diet with Ensure TID. Per EMR, accepting most of meals and supplements recently. Glucose high so will switch ONS to Glucerna. Malnutrition Assessment:  Malnutrition Status: No malnutrition    Nutrition Related Findings: +2 non pitting edema      Current Nutrition Therapies:    ADULT DIET;  Regular; 4 carb choices (60 gm/meal)  Adult Oral Nutrition Supplement; Standard High Calorie/High Protein Oral Supplement    Anthropometric Measures:  · Height: 5' 5\" (165.1 cm)  · Current Body Wt: 241 lb (109.3 kg)   · BMI: 40.1    Nutrition Diagnosis:   No nutrition diagnosis at this time     Nutrition Interventions:   Food and/or Nutrient Delivery:  Continue Current Diet, Modify Oral Nutrition Supplement  Nutrition Education/Counseling:  No recommendation at this time   Coordination of Nutrition Care:  Continue to monitor while inpatient    Goals:  Consume greater than 50% of meals and supplements this admission       Nutrition Monitoring and Evaluation:   Behavioral-Environmental Outcomes:  None Identified   Food/Nutrient Intake Outcomes:  Food and Nutrient Intake, Supplement Intake  Physical Signs/Symptoms Outcomes:  Biochemical Data, Nutrition Focused Physical Findings, Skin, Weight     Discharge Planning:    No discharge needs at this time     Electronically signed by Jose Smith RD, LD on 8/24/21 at 10:53 AM EDT    Contact: [phone]

## 2021-08-24 NOTE — CARE COORDINATION
Discharge Planning:  SW spoke with pt to discuss d/c needs. Pt stated that she is up independently in the room and does not anticipate that she will need any assistance upon return home unless she should require oxygen. Pt stated that she has created a hoarders home and would eventually like some assistance in getting her home cleaned up and pt is interested in obtaining home delivered meals. SW talked with pt about the [facility] Fast track Program and pt was agreeable to a referral.  Referral faxed to [address] [place] [address] at [phone]. Pt stated that her son will transport her home at d/c. No other needs noted at this time. Plan: return home upon d/c. Will follow for possible home O2 needs. Son to transport pt home at d/c. Fast track referral initiated.    Electronically signed by PEAK-IT Santos on 8/24/2021 at 10:30 AM

## 2021-08-25 LAB
A/G RATIO: 1.2 (ref 1.1–2.2)
ALBUMIN SERPL-MCNC: 2.9 G/DL (ref 3.4–5)
ALP BLD-CCNC: 68 U/L (ref 40–129)
ALT SERPL-CCNC: 12 U/L (ref 10–40)
ANION GAP SERPL CALCULATED.3IONS-SCNC: 14 MMOL/L (ref 3–16)
ANION GAP SERPL CALCULATED.3IONS-SCNC: 15 MMOL/L (ref 3–16)
ANION GAP SERPL CALCULATED.3IONS-SCNC: 19 MMOL/L (ref 3–16)
AST SERPL-CCNC: 13 U/L (ref 15–37)
BASOPHILS ABSOLUTE: 0 K/UL (ref 0–0.2)
BASOPHILS RELATIVE PERCENT: 0.1 %
BILIRUB SERPL-MCNC: 0.3 MG/DL (ref 0–1)
BILIRUBIN URINE: NEGATIVE
BLOOD, URINE: NEGATIVE
BUN BLDV-MCNC: 105 MG/DL (ref 7–20)
BUN BLDV-MCNC: 113 MG/DL (ref 7–20)
BUN BLDV-MCNC: 121 MG/DL (ref 7–20)
C-REACTIVE PROTEIN: 6.6 MG/L (ref 0–5.1)
CALCIUM SERPL-MCNC: 8.4 MG/DL (ref 8.3–10.6)
CALCIUM SERPL-MCNC: 8.6 MG/DL (ref 8.3–10.6)
CALCIUM SERPL-MCNC: 8.8 MG/DL (ref 8.3–10.6)
CHLORIDE BLD-SCNC: 96 MMOL/L (ref 99–110)
CHLORIDE BLD-SCNC: 97 MMOL/L (ref 99–110)
CHLORIDE BLD-SCNC: 98 MMOL/L (ref 99–110)
CLARITY: CLEAR
CO2: 14 MMOL/L (ref 21–32)
CO2: 20 MMOL/L (ref 21–32)
CO2: 21 MMOL/L (ref 21–32)
COLOR: YELLOW
CREAT SERPL-MCNC: 2.2 MG/DL (ref 0.6–1.2)
CREAT SERPL-MCNC: 2.5 MG/DL (ref 0.6–1.2)
CREAT SERPL-MCNC: 2.7 MG/DL (ref 0.6–1.2)
CREATININE URINE: 49.4 MG/DL (ref 28–259)
D DIMER: 270 NG/ML DDU (ref 0–229)
EOSINOPHILS ABSOLUTE: 0 K/UL (ref 0–0.6)
EOSINOPHILS RELATIVE PERCENT: 0 %
EPITHELIAL CELLS, UA: 2 /HPF (ref 0–5)
FERRITIN: 605.4 NG/ML (ref 15–150)
GFR AFRICAN AMERICAN: 21
GFR AFRICAN AMERICAN: 23
GFR AFRICAN AMERICAN: 27
GFR NON-AFRICAN AMERICAN: 17
GFR NON-AFRICAN AMERICAN: 19
GFR NON-AFRICAN AMERICAN: 22
GLOBULIN: 2.5 G/DL
GLUCOSE BLD-MCNC: 216 MG/DL (ref 70–99)
GLUCOSE BLD-MCNC: 237 MG/DL (ref 70–99)
GLUCOSE BLD-MCNC: 273 MG/DL (ref 70–99)
GLUCOSE BLD-MCNC: 340 MG/DL (ref 70–99)
GLUCOSE BLD-MCNC: 353 MG/DL (ref 70–99)
GLUCOSE BLD-MCNC: 377 MG/DL (ref 70–99)
GLUCOSE BLD-MCNC: 408 MG/DL (ref 70–99)
GLUCOSE URINE: 250 MG/DL
HCT VFR BLD CALC: 40.5 % (ref 36–48)
HEMOGLOBIN: 13.4 G/DL (ref 12–16)
HYALINE CASTS: 0 /LPF (ref 0–8)
KETONES, URINE: NEGATIVE MG/DL
LEUKOCYTE ESTERASE, URINE: ABNORMAL
LYMPHOCYTES ABSOLUTE: 0.9 K/UL (ref 1–5.1)
LYMPHOCYTES RELATIVE PERCENT: 4.7 %
MCH RBC QN AUTO: 30.9 PG (ref 26–34)
MCHC RBC AUTO-ENTMCNC: 33 G/DL (ref 31–36)
MCV RBC AUTO: 93.5 FL (ref 80–100)
MICROSCOPIC EXAMINATION: YES
MONOCYTES ABSOLUTE: 0.7 K/UL (ref 0–1.3)
MONOCYTES RELATIVE PERCENT: 3.7 %
NEUTROPHILS ABSOLUTE: 16.9 K/UL (ref 1.7–7.7)
NEUTROPHILS RELATIVE PERCENT: 91.5 %
NITRITE, URINE: NEGATIVE
PDW BLD-RTO: 14.4 % (ref 12.4–15.4)
PERFORMED ON: ABNORMAL
PH UA: 6 (ref 5–8)
PLATELET # BLD: 474 K/UL (ref 135–450)
PMV BLD AUTO: 8.7 FL (ref 5–10.5)
POTASSIUM REFLEX MAGNESIUM: 5.6 MMOL/L (ref 3.5–5.1)
POTASSIUM REFLEX MAGNESIUM: 5.9 MMOL/L (ref 3.5–5.1)
POTASSIUM REFLEX MAGNESIUM: 5.9 MMOL/L (ref 3.5–5.1)
PROTEIN UA: NEGATIVE MG/DL
RBC # BLD: 4.33 M/UL (ref 4–5.2)
RBC UA: 1 /HPF (ref 0–4)
SODIUM BLD-SCNC: 129 MMOL/L (ref 136–145)
SODIUM BLD-SCNC: 132 MMOL/L (ref 136–145)
SODIUM BLD-SCNC: 133 MMOL/L (ref 136–145)
SPECIFIC GRAVITY UA: 1.02 (ref 1–1.03)
TOTAL PROTEIN: 5.4 G/DL (ref 6.4–8.2)
UREA NITROGEN, UR: 1101 MG/DL (ref 800–1666)
URINE TYPE: ABNORMAL
UROBILINOGEN, URINE: 0.2 E.U./DL
WBC # BLD: 18.5 K/UL (ref 4–11)
WBC UA: 16 /HPF (ref 0–5)

## 2021-08-25 PROCEDURE — 97530 THERAPEUTIC ACTIVITIES: CPT

## 2021-08-25 PROCEDURE — 81001 URINALYSIS AUTO W/SCOPE: CPT

## 2021-08-25 PROCEDURE — 85025 COMPLETE CBC W/AUTO DIFF WBC: CPT

## 2021-08-25 PROCEDURE — 2060000000 HC ICU INTERMEDIATE R&B

## 2021-08-25 PROCEDURE — 6360000002 HC RX W HCPCS: Performed by: FAMILY MEDICINE

## 2021-08-25 PROCEDURE — 94761 N-INVAS EAR/PLS OXIMETRY MLT: CPT

## 2021-08-25 PROCEDURE — 2580000003 HC RX 258: Performed by: FAMILY MEDICINE

## 2021-08-25 PROCEDURE — 86140 C-REACTIVE PROTEIN: CPT

## 2021-08-25 PROCEDURE — 6370000000 HC RX 637 (ALT 250 FOR IP): Performed by: INTERNAL MEDICINE

## 2021-08-25 PROCEDURE — 97162 PT EVAL MOD COMPLEX 30 MIN: CPT

## 2021-08-25 PROCEDURE — 6360000002 HC RX W HCPCS: Performed by: INTERNAL MEDICINE

## 2021-08-25 PROCEDURE — 6370000000 HC RX 637 (ALT 250 FOR IP): Performed by: FAMILY MEDICINE

## 2021-08-25 PROCEDURE — 97535 SELF CARE MNGMENT TRAINING: CPT

## 2021-08-25 PROCEDURE — 51701 INSERT BLADDER CATHETER: CPT

## 2021-08-25 PROCEDURE — 82728 ASSAY OF FERRITIN: CPT

## 2021-08-25 PROCEDURE — 94640 AIRWAY INHALATION TREATMENT: CPT

## 2021-08-25 PROCEDURE — 97116 GAIT TRAINING THERAPY: CPT

## 2021-08-25 PROCEDURE — 82570 ASSAY OF URINE CREATININE: CPT

## 2021-08-25 PROCEDURE — 80053 COMPREHEN METABOLIC PANEL: CPT

## 2021-08-25 PROCEDURE — 84540 ASSAY OF URINE/UREA-N: CPT

## 2021-08-25 PROCEDURE — 85379 FIBRIN DEGRADATION QUANT: CPT

## 2021-08-25 PROCEDURE — 6370000000 HC RX 637 (ALT 250 FOR IP): Performed by: HOSPITALIST

## 2021-08-25 PROCEDURE — 51798 US URINE CAPACITY MEASURE: CPT

## 2021-08-25 PROCEDURE — 2500000003 HC RX 250 WO HCPCS: Performed by: HOSPITALIST

## 2021-08-25 PROCEDURE — 2500000003 HC RX 250 WO HCPCS: Performed by: INTERNAL MEDICINE

## 2021-08-25 PROCEDURE — 97165 OT EVAL LOW COMPLEX 30 MIN: CPT

## 2021-08-25 PROCEDURE — 99223 1ST HOSP IP/OBS HIGH 75: CPT | Performed by: HOSPITALIST

## 2021-08-25 PROCEDURE — 99232 SBSQ HOSP IP/OBS MODERATE 35: CPT | Performed by: INTERNAL MEDICINE

## 2021-08-25 PROCEDURE — 36415 COLL VENOUS BLD VENIPUNCTURE: CPT

## 2021-08-25 RX ORDER — INSULIN GLARGINE 100 [IU]/ML
50 INJECTION, SOLUTION SUBCUTANEOUS NIGHTLY
Status: DISCONTINUED | OUTPATIENT
Start: 2021-08-25 | End: 2021-08-26

## 2021-08-25 RX ORDER — SODIUM CHLORIDE 9 MG/ML
INJECTION, SOLUTION INTRAVENOUS CONTINUOUS
Status: DISCONTINUED | OUTPATIENT
Start: 2021-08-25 | End: 2021-08-25

## 2021-08-25 RX ORDER — 0.9 % SODIUM CHLORIDE 0.9 %
1000 INTRAVENOUS SOLUTION INTRAVENOUS ONCE
Status: COMPLETED | OUTPATIENT
Start: 2021-08-25 | End: 2021-08-25

## 2021-08-25 RX ADMIN — INSULIN LISPRO 12 UNITS: 100 INJECTION, SOLUTION INTRAVENOUS; SUBCUTANEOUS at 17:39

## 2021-08-25 RX ADMIN — NYSTATIN 500000 UNITS: 100000 SUSPENSION ORAL at 08:35

## 2021-08-25 RX ADMIN — APIXABAN 5 MG: 5 TABLET, FILM COATED ORAL at 08:35

## 2021-08-25 RX ADMIN — ACETAMINOPHEN 650 MG: 325 TABLET ORAL at 02:03

## 2021-08-25 RX ADMIN — METOPROLOL TARTRATE 25 MG: 25 TABLET, FILM COATED ORAL at 21:02

## 2021-08-25 RX ADMIN — IPRATROPIUM BROMIDE AND ALBUTEROL 1 PUFF: 20; 100 SPRAY, METERED RESPIRATORY (INHALATION) at 12:13

## 2021-08-25 RX ADMIN — INSULIN LISPRO 15 UNITS: 100 INJECTION, SOLUTION INTRAVENOUS; SUBCUTANEOUS at 12:20

## 2021-08-25 RX ADMIN — IPRATROPIUM BROMIDE AND ALBUTEROL 1 PUFF: 20; 100 SPRAY, METERED RESPIRATORY (INHALATION) at 20:59

## 2021-08-25 RX ADMIN — NYSTATIN 500000 UNITS: 100000 SUSPENSION ORAL at 17:39

## 2021-08-25 RX ADMIN — NYSTATIN 500000 UNITS: 100000 SUSPENSION ORAL at 21:02

## 2021-08-25 RX ADMIN — IPRATROPIUM BROMIDE AND ALBUTEROL 1 PUFF: 20; 100 SPRAY, METERED RESPIRATORY (INHALATION) at 08:28

## 2021-08-25 RX ADMIN — METOPROLOL TARTRATE 25 MG: 25 TABLET, FILM COATED ORAL at 08:35

## 2021-08-25 RX ADMIN — CALCIUM GLUCONATE 1000 MG: 98 INJECTION, SOLUTION INTRAVENOUS at 15:50

## 2021-08-25 RX ADMIN — INSULIN LISPRO 6 UNITS: 100 INJECTION, SOLUTION INTRAVENOUS; SUBCUTANEOUS at 08:34

## 2021-08-25 RX ADMIN — BARICITINIB 4 MG: 2 TABLET, FILM COATED ORAL at 08:35

## 2021-08-25 RX ADMIN — METHYLPREDNISOLONE SODIUM SUCCINATE 40 MG: 40 INJECTION, POWDER, FOR SOLUTION INTRAMUSCULAR; INTRAVENOUS at 06:10

## 2021-08-25 RX ADMIN — INSULIN LISPRO 18 UNITS: 100 INJECTION, SOLUTION INTRAVENOUS; SUBCUTANEOUS at 12:20

## 2021-08-25 RX ADMIN — SODIUM BICARBONATE: 84 INJECTION, SOLUTION INTRAVENOUS at 15:41

## 2021-08-25 RX ADMIN — INSULIN GLARGINE 50 UNITS: 100 INJECTION, SOLUTION SUBCUTANEOUS at 21:03

## 2021-08-25 RX ADMIN — INSULIN LISPRO 15 UNITS: 100 INJECTION, SOLUTION INTRAVENOUS; SUBCUTANEOUS at 16:30

## 2021-08-25 RX ADMIN — APIXABAN 5 MG: 5 TABLET, FILM COATED ORAL at 21:02

## 2021-08-25 RX ADMIN — INSULIN LISPRO 18 UNITS: 100 INJECTION, SOLUTION INTRAVENOUS; SUBCUTANEOUS at 08:35

## 2021-08-25 RX ADMIN — SODIUM CHLORIDE 1000 ML: 9 INJECTION, SOLUTION INTRAVENOUS at 11:09

## 2021-08-25 RX ADMIN — SODIUM ZIRCONIUM CYCLOSILICATE 10 G: 10 POWDER, FOR SUSPENSION ORAL at 15:52

## 2021-08-25 RX ADMIN — IPRATROPIUM BROMIDE AND ALBUTEROL 1 PUFF: 20; 100 SPRAY, METERED RESPIRATORY (INHALATION) at 16:24

## 2021-08-25 RX ADMIN — NYSTATIN 500000 UNITS: 100000 SUSPENSION ORAL at 12:20

## 2021-08-25 RX ADMIN — PANTOPRAZOLE SODIUM 40 MG: 40 TABLET, DELAYED RELEASE ORAL at 06:10

## 2021-08-25 RX ADMIN — INSULIN LISPRO 5 UNITS: 100 INJECTION, SOLUTION INTRAVENOUS; SUBCUTANEOUS at 21:03

## 2021-08-25 RX ADMIN — ACETAMINOPHEN 650 MG: 325 TABLET ORAL at 19:47

## 2021-08-25 RX ADMIN — METHYLPREDNISOLONE SODIUM SUCCINATE 40 MG: 40 INJECTION, POWDER, FOR SOLUTION INTRAMUSCULAR; INTRAVENOUS at 17:39

## 2021-08-25 RX ADMIN — ALLOPURINOL 300 MG: 300 TABLET ORAL at 08:35

## 2021-08-25 RX ADMIN — INSULIN LISPRO 22 UNITS: 100 INJECTION, SOLUTION INTRAVENOUS; SUBCUTANEOUS at 17:39

## 2021-08-25 ASSESSMENT — PAIN SCALES - GENERAL
PAINLEVEL_OUTOF10: 0
PAINLEVEL_OUTOF10: 1
PAINLEVEL_OUTOF10: 3
PAINLEVEL_OUTOF10: 0

## 2021-08-25 ASSESSMENT — PAIN - FUNCTIONAL ASSESSMENT: PAIN_FUNCTIONAL_ASSESSMENT: ACTIVITIES ARE NOT PREVENTED

## 2021-08-25 ASSESSMENT — PAIN DESCRIPTION - ORIENTATION: ORIENTATION: MID

## 2021-08-25 ASSESSMENT — PAIN DESCRIPTION - ONSET: ONSET: GRADUAL

## 2021-08-25 ASSESSMENT — PAIN DESCRIPTION - PAIN TYPE: TYPE: ACUTE PAIN

## 2021-08-25 ASSESSMENT — PAIN DESCRIPTION - PROGRESSION: CLINICAL_PROGRESSION: GRADUALLY WORSENING

## 2021-08-25 ASSESSMENT — PAIN DESCRIPTION - LOCATION: LOCATION: ABDOMEN

## 2021-08-25 ASSESSMENT — PAIN DESCRIPTION - FREQUENCY: FREQUENCY: INTERMITTENT

## 2021-08-25 ASSESSMENT — PAIN DESCRIPTION - DESCRIPTORS: DESCRIPTORS: ACHING

## 2021-08-25 NOTE — PLAN OF CARE
Trinidad Still RN  Outcome: Ongoing  8/25/2021 0033 by Merideth Lesches, RN  Outcome: Ongoing     Problem: Isolation Precautions - Risk of Spread of Infection  Goal: Prevent transmission of infection  8/25/2021 1200 by Christina Gant RN  Outcome: Ongoing  8/25/2021 0033 by Merideth Lesches, RN  Outcome: Ongoing     Problem: Nutrition Deficits  Goal: Optimize nutritional status  8/25/2021 1200 by Christina Gant RN  Outcome: Ongoing  8/25/2021 0033 by Merideth Lesches, RN  Outcome: Ongoing     Problem: Risk for Fluid Volume Deficit  Goal: Maintain normal heart rhythm  8/25/2021 1200 by Christina Gant RN  Outcome: Ongoing  8/25/2021 0033 by Merideth Lesches, RN  Outcome: Ongoing  Goal: Maintain absence of muscle cramping  8/25/2021 1200 by Christina Gant RN  Outcome: Ongoing  8/25/2021 0033 by Merideth Lesches, RN  Outcome: Met This Shift  Goal: Maintain normal serum potassium, sodium, calcium, phosphorus, and pH  8/25/2021 1200 by Christina Gant RN  Outcome: Ongoing  8/25/2021 0033 by Merideth Lesches, RN  Outcome: Ongoing     Problem: Loneliness or Risk for Loneliness  Goal: Demonstrate positive use of time alone when socialization is not possible  8/25/2021 1200 by Christina Gant RN  Outcome: Ongoing  8/25/2021 0033 by Merideth Lesches, RN  Outcome: Ongoing     Problem: Fatigue  Goal: Verbalize increase energy and improved vitality  8/25/2021 1200 by Christina Gant RN  Outcome: Ongoing  8/25/2021 0033 by Merideth Lesches, RN  Outcome: Ongoing     Problem: Patient Education: Go to Patient Education Activity  Goal: Patient/Family Education  8/25/2021 1200 by Christina Gant RN  Outcome: Ongoing  8/25/2021 0033 by Merideth Lesches, RN  Outcome: Ongoing     Problem: Pain:  Goal: Pain level will decrease  Description: Pain level will decrease  8/25/2021 1200 by Christina Gant RN  Outcome: Ongoing  8/25/2021 0033 by Merideth Lesches, RN  Outcome: Ongoing  Goal: Control of acute pain  Description: Control of acute pain  8/25/2021 1200 by Christina Gant RN  Outcome: Ongoing  8/25/2021 0033 by Yue Ashby RN  Outcome: Ongoing  Goal: Control of chronic pain  Description: Control of chronic pain  8/25/2021 1200 by Alban Salazar RN  Outcome: Ongoing  8/25/2021 0033 by Yue Ashby RN  Outcome: Ongoing

## 2021-08-25 NOTE — PROGRESS NOTES
Units Subcutaneous Nightly    baricitinib  4 mg Oral Daily    allopurinol  300 mg Oral Daily    lisinopril-hydroCHLOROthiazide  1 tablet Oral Daily    pantoprazole  40 mg Oral QAM AC       Continuous Infusions:   dextrose         PRN Meds:  sodium chloride, glucose, dextrose, glucagon (rDNA), dextrose, perflutren lipid microspheres, acetaminophen, ALPRAZolam, HYDROcodone-acetaminophen    Labs reviewed:  CBC:   Recent Labs     08/23/21 0609 08/24/21 0633 08/25/21 0522   WBC 15.1* 18.1* 18.5*   HGB 14.0 13.7 13.4   HCT 41.7 41.5 40.5   MCV 92.3 93.0 93.5    518* 474*     BMP:   Recent Labs     08/23/21 0609 08/24/21 0633 08/25/21 0522    134* 133*   K 4.8 4.6 5.6*   CL 98* 96* 98*   CO2 29 26 21   BUN 57* 80* 121*   CREATININE 1.2 1.6* 2.5*     LIVER PROFILE:   Recent Labs     08/23/21 0609 08/24/21 0633 08/25/21 0522   AST 10* 10* 13*   ALT 11 12 12   BILITOT 0.3 0.3 0.3   ALKPHOS 65 59 68     PT/INR: No results for input(s): PROTIME, INR in the last 72 hours. APTT: No results for input(s): APTT in the last 72 hours. UA:No results for input(s): NITRITE, COLORU, PHUR, LABCAST, WBCUA, RBCUA, MUCUS, TRICHOMONAS, YEAST, BACTERIA, CLARITYU, SPECGRAV, LEUKOCYTESUR, UROBILINOGEN, BILIRUBINUR, BLOODU, GLUCOSEU, AMORPHOUS in the last 72 hours. Invalid input(s): Michaela Engel  No results for input(s): PH, PCO2, PO2 in the last 72 hours. Cx:      Films: This note was transcribed using [id number]. Please disregard any translational errors.       Matthew [phone]

## 2021-08-25 NOTE — PLAN OF CARE
Problem: Falls - Risk of:  Goal: Will remain free from falls  Description: Will remain free from falls  Outcome: Met This Shift  Goal: Absence of physical injury  Description: Absence of physical injury  Outcome: Met This Shift   Alert and oriented x4 Dyspnic with exertion Sats. WNL O2 now at 2L Lungs diminished Cough and deep breathing exercises encouraged No c/o pain Up to [facility] and chair indep.  Free from fall/injury Frequently turns and repositions self

## 2021-08-25 NOTE — PROGRESS NOTES
Hospitalist Progress Note      PCP: Ari Herndon MD    Date of Admission: 8/17/2021    Chief Complaint:     Hospital Course:      Subjective: weaned to RA, but having SURAJ and hyperkalemia      Medications:  Reviewed    Infusion Medications    sodium bicarbonate in sterile water infusion 150 mL/hr at 08/25/21 1541    dextrose       Scheduled Medications    [Held by provider] baricitinib  1 mg Oral Daily    insulin glargine  50 Units Subcutaneous Nightly    insulin lispro  22 Units Subcutaneous TID     metoprolol tartrate  25 mg Oral BID    apixaban  5 mg Oral BID    albuterol-ipratropium  1 puff Inhalation Q4H WA    methylPREDNISolone sodium  40 mg IntraVENous Q12H    nystatin  5 mL Oral 4x Daily    insulin lispro  0-18 Units Subcutaneous TID WC    insulin lispro  0-9 Units Subcutaneous Nightly    [Held by provider] allopurinol  300 mg Oral Daily    [Held by provider] lisinopril-hydroCHLOROthiazide  1 tablet Oral Daily    pantoprazole  40 mg Oral QAM AC     PRN Meds: sodium chloride, glucose, dextrose, glucagon (rDNA), dextrose, perflutren lipid microspheres, acetaminophen, ALPRAZolam, HYDROcodone-acetaminophen      Intake/Output Summary (Last 24 hours) at 8/25/2021 1745  Last data filed at 8/25/2021 1712  Gross per 24 hour   Intake 1863.42 ml   Output 2000 ml   Net -136.58 ml       Exam:    BP (!) 103/55   Pulse 56   Temp 97.9 °F (36.6 °C) (Oral)   Resp 18   Ht 5' 5\" (1.651 m)   Wt 243 lb 9.7 oz (110.5 kg)   SpO2 94%   BMI 40.54 kg/m²     General appearance: No apparent distress, appears stated age and cooperative. HEENT: Pupils equal, round, and reactive to light. Conjunctivae/corneas clear. Neck: Supple, with full range of motion. No jugular venous distention. Trachea midline. Respiratory:  Normal respiratory effort. Clear to auscultation, bilaterally without Rales/Wheezes/Rhonchi.   Cardiovascular: Regular rate and rhythm with normal S1/S2 without murmurs, rubs or gallops. Abdomen: Soft, non-tender, non-distended with normal bowel sounds. Musculoskeletal: No clubbing, cyanosis or edema bilaterally. Full range of motion without deformity. Skin: Skin color, texture, turgor normal.  No rashes or lesions. Neurologic:  Neurovascularly intact without any focal sensory/motor deficits. Cranial nerves: II-XII intact, grossly non-focal.  Psychiatric: Alert and oriented, thought content appropriate, normal insight  Capillary Refill: Brisk,< 3 seconds   Peripheral Pulses: +2 palpable, equal bilaterally       Labs:   Recent Labs     08/23/21  0609 08/24/21 0633 08/25/21 0522   WBC 15.1* 18.1* 18.5*   HGB 14.0 13.7 13.4   HCT 41.7 41.5 40.5    518* 474*     Recent Labs     08/24/21 0633 08/25/21 0522 08/25/21  1326   * 133* 132*   K 4.6 5.6* 5.9*   CL 96* 98* 97*   CO2 26 21 20*   BUN 80* 121* 113*   CREATININE 1.6* 2.5* 2.7*   CALCIUM 9.2 8.8 8.6     Recent Labs     08/23/21  0609 08/24/21 0633 08/25/21 0522   AST 10* 10* 13*   ALT 11 12 12   BILITOT 0.3 0.3 0.3   ALKPHOS 65 59 68     No results for input(s): INR in the last 72 hours. No results for input(s): Martina Damian in the last 72 hours. Urinalysis:      Lab Results   Component Value Date    NITRU Negative 08/25/2021    WBCUA 16 08/25/2021    RBCUA 1 08/25/2021    BLOODU Negative 08/25/2021    SPECGRAV 1.018 08/25/2021    GLUCOSEU 250 08/25/2021    GLUCOSEU >=1000 06/04/2010       Radiology:  CT CHEST WO CONTRAST   Final Result   Moderate multifocal opacity throughout all lobes bilaterally, consistent with   viral pneumonitis in this patient with history of COVID-19 infection. Trace pleural effusions. Mild mediastinal adenopathy, likely reactive. Bilateral adrenal adenomas. Mild mural thickening of the distal esophagus; correlate for esophagitis. XR CHEST PORTABLE   Final Result   Left mid and bibasilar infiltrates consistent with pneumonia. Assessment/Plan:    Active Hospital Problems    Diagnosis Date Noted    Community acquired pneumonia of left lower lobe of lung [J18.9]     Acute respiratory failure with hypoxemia ([facility] [address] [J96.01]     COVID-19 [U07.1]     Acute respiratory failure ([facility] [address] [J96.00] 08/17/2021       Pneumonia due to COVID-19 infection - improving  -IV Solumedrol  -Remdesivir x 10 days  -baricitnib x 14 days  -vitamin D supplementation  -empiric IV Vanc and Levaquin to cover for possible superimposed bacterial infection  -MRSA swab negative, stopped Vanc  -PPE  -supportive care  -contact plus isolation  -Lovenox BID-> Eliquis with Afib noted  -Combivent QID  -trend inflammatory markers     SURAJ and Hyperkalemia:  - uncertain cause. Could be urinary retention -- was bladder scaned with > 350cc urine-- but has been making urine throughout the day  -insulin, calcium gluconate, sodium bicarb drip  - s/p 1L NS bolus  - nephrology following     Atrial fibrillation with rapid ventricular response  -cardiology consulted, recs appreciated  -PO Lopressor 25 mg BID  -PO Eliquis 5 mg BID  -tele monitoring     Acute respiratory failure with hypoxia - due to COVID-19 infection, improving  -management as above  -wean oxygen to maintain SpO2 > 89%     Type 2 DM - uncontrolled  -Lantus adjusted  -Humalog adjusted  -SSI  -hypoglycemia protocol  -POCT glucose checks     Anxiety and depression  -continue home meds     Essential hypertension  -continue home meds     Obesity - BMI 39.71  -nutritional counseling provided  -weight loss encouraged  -complicating medical management    DVT Prophylaxis: Eliquis  Diet: ADULT DIET;  Regular; 4 carb choices (60 gm/meal)  Adult Oral Nutrition Supplement; Diabetic Oral Supplement  Code Status: Full Code    PT/OT Eval Status: ordered    Stacey Napier MD

## 2021-08-25 NOTE — PROGRESS NOTES
Pt bladder scan . Dr Serena Mireles notified. New orders to straight cath. Straight cath performed per sterile technique, 250 mls yellow urine drained.     Electronically signed by Rodolfo Bland RN on 8/25/2021 at 4:45 PM

## 2021-08-25 NOTE — CONSULTS
Consult received  Full note to follow    Autumn Koo MD  8/25/2021    [facility] [address]  Office : [phone]  Fax :[phone]

## 2021-08-25 NOTE — CONSULTS
Office: [phone]       Fax: [phone]      Nephrology Initial Consult Note        Patient's Name: Ayesha Blue Date: 2021  Date of Visit: 2021    Reason for Consult: SURAJ  Requesting Physician: Dhruv Perez MD  PCP: Benitez Ramos MD    Chief Complaint:  shortness of breath      History of Present Illness:      Kenna Monroy is a 69 y.o. female with PMHx of hypertension, diabetes mellitus  who was hospitalized on 2021 with complaints of shortness of breath   Pt was managed for acute resp failure 2/2 COVID pneumonia. She received Remdesivir, steroids, Baricitinib  Creatinine now rising along with BUN, WBC  Had SBP in 90s yesterday  Also had PAF during hospital stay  UO unmeasured. Wt about 5 lb up since admission  NSAID use: Denies   IV contrast: None recent   Home and current meds reviewed . Received Vanc, levaquin. Lisinopril held today    Past Medical History:   Diagnosis Date    Anxiety     Arthritis     Chronic back pain     Depression     Diabetes 1.5, managed as type 2 ([facility] [address]     Hypertension     Obesity     Psoriasis        Past Surgical History:   Procedure Laterality Date     SECTION      LEG SURGERY Right        Family History   Problem Relation Age of Onset    High Blood Pressure Mother     High Blood Pressure Father         reports that she has never smoked. She has never used smokeless tobacco. She reports that she does not drink alcohol and does not use drugs. Medications:    Allergies:  Penicillins    Scheduled Meds:   sodium chloride  1,000 mL IntraVENous Once    insulin glargine  25 Units Subcutaneous Nightly    insulin lispro  18 Units Subcutaneous TID     metoprolol tartrate  25 mg Oral BID    apixaban  5 mg Oral BID    albuterol-ipratropium  1 puff Inhalation Q4H WA  methylPREDNISolone sodium  40 mg IntraVENous Q12H    nystatin  5 mL Oral 4x Daily    insulin lispro  0-18 Units Subcutaneous TID WC    insulin lispro  0-9 Units Subcutaneous Nightly    baricitinib  4 mg Oral Daily    allopurinol  300 mg Oral Daily    [Held by provider] lisinopril-hydroCHLOROthiazide  1 tablet Oral Daily    pantoprazole  40 mg Oral QAM AC     Continuous Infusions:   dextrose         Labs:  CBC:   Recent Labs     08/23/21  0609 08/24/21  0633 08/25/21  0522   WBC 15.1* 18.1* 18.5*   HGB 14.0 13.7 13.4    518* 474*     Ca/Mg/Phos:   Recent Labs     08/23/21  0609 08/24/21  0633 08/25/21  0522   CALCIUM 9.6 9.2 8.8     UA:No results for input(s): Miguel Angel Greenwood, GLUCOSEU, BILIRUBINUR, [place] [place], PROTEINU, UROBILINOGEN, NITRU, LEUKOCYTESUR, Ellen Bowen in the last 72 hours. Urine Microscopic: No results for input(s): LABCAST, BACTERIA, COMU, HYALCAST, WBCUA, RBCUA, EPIU in the last 72 hours. Urine Chemistry: No results for input(s): Kalie Parody, PROTEINUR, NAUR in the last 72 hours. ROS:     All systems reviewed and negative except as in HPI    Objective:     Vitals: /62   Pulse 80   Temp 98.6 °F (37 °C) (Oral)   Resp 18 Comment: Simultaneous filing. User may not have seen previous data.   Ht 5' 5\" (1.651 m)   Wt 243 lb 9.7 oz (110.5 kg)   SpO2 96%   BMI 40.54 kg/m²    Wt Readings from Last 3 Encounters:   08/25/21 243 lb 9.7 oz (110.5 kg)   08/13/21 244 lb (110.7 kg)   04/29/21 259 lb (117.5 kg)      24HR INTAKE/OUTPUT:  No intake or output data in the 24 hours ending 08/25/21 1037  Constitutional:  awake, NAD  HEENT:  MMM, No icterus  Neck: no bruits, No JVD  Cardiovascular:  reg rhythm  Respiratory: Diminished at bases   Abdomen:  +BS, soft, NT, ND  Ext: + lower extremity edema  Psychiatric: mood and affect appropriate  Skin: dry/intact  CNS: alert, no agitation    IMAGING:  CT CHEST WO CONTRAST   Final Result   Moderate multifocal opacity throughout all lobes bilaterally, consistent with   viral pneumonitis in this patient with history of COVID-19 infection. Trace pleural effusions. Mild mediastinal adenopathy, likely reactive. Bilateral adrenal adenomas. Mild mural thickening of the distal esophagus; correlate for esophagitis. XR CHEST PORTABLE   Final Result   Left mid and bibasilar infiltrates consistent with pneumonia. Assessment :     1. SURAJ  -Non-Oliguric  -Baseline creat: <1 Now 1->1.2->1.6->2.5  -UA: on admission, conc, pyuria, hyaline cast, prot negative   -Volume: Hypervolemic   -Electrolytes: Hyperkalemia  -Acid-Base: NAGMA and AGMA no ABG  -Renal hypoperfusion, AIN in diff. No uremia    Recent Labs     08/23/21  0609 08/24/21  0633 08/25/21  0522   BUN 57* 80* 121*   CREATININE 1.2 1.6* 2.5*     Recent Labs     08/23/21  0609 08/24/21  0633 08/25/21  0522    134* 133*   K 4.8 4.6 5.6*   CO2 29 26 21     2. HTN  -Blood pressure low normal    BP Readings from Last 1 Encounters:   08/25/21 112/62       3. DM  -takes metformin at home    Lab Results   Component Value Date    LABA1C 7.5 06/07/2021       4. COVID pneumonia     Plan:     - agree to hold Lisinopril/HCTZ  - recheck UA  - Ur urea/creat  - IVF for hemodynamic support. Will need to be back on home lasix  - Lokelma  - Bladder scan  - Monitor BMP    -Monitor I/O, UOP  -Maintain MAP>65  -Avoid nephrotoxin, if able. -Dose meds to current eGFR    Spoke to Claudette Weber MD     Thank you for allowing us to participate in care of [address] [place] [address] . We will continue to follow. Feel free to contact me with any questions.       Tani Adan MD  8/25/2021    [facility]  [address]  Office : [phone]  Fax :[phone]

## 2021-08-25 NOTE — PROGRESS NOTES
Clinical Pharmacy Note  Renal Dose Adjustment    Maria Isabel Armas is receiving Baricitinib 4 mg po daily. This medication is renally eliminated. Based on the patient's Estimated Creatinine Clearance: 26 mL/min (A) (based on SCr of 2.5 mg/dL (H)). and urine output, the dose has been adjusted to Baricitinib 1 mg po daily per protocol. Pharmacy will continue to monitor and adjust dose as needed for changes in renal function.       Jay Kelsey [facility], PharmD, BCPS  8/25/2021 11:23 AM

## 2021-08-25 NOTE — PROGRESS NOTES
Physical Therapy    Facility/Department: [address] PROGRESSIVE CARE  Initial Assessment    NAME: Deborah Simons  : 1950  MRN: [id number]    Date of Service: 2021    Discharge Recommendations:  Continue to assess pending progress, Home with Home health PT   PT Equipment Recommendations  Other: Would recommend an Alert button. Assessment   Body structures, Functions, Activity limitations: Decreased functional mobility ; Decreased endurance  Assessment: 68 y/o female admit 2021 with Acute Respiratory, Pneumonia, COVID +. PMH as noted including Arthritis, Chronic Back Pain, Psoriasis, Anxiety, Obesity. PTA pt living alone in multi level home and primarly stays on main level of home; independent daily care and functional mobility. At this time, would recommend Home PT (however need to ensure able to negotiate stairs in home)  to ensure safe transition home alone. Prognosis: Fair  Decision Making: Medium Complexity  History: 68 y/o female admit 2021 with Acute Respiratory, Pneumonia, COVID +. PMH as noted including Arthritis, Chronic Back Pain, Psoriasis, Anxiety, Obesity. Exam: See above. Clinical Presentation: See above. Patient Education: Role of PT, POC, Need to call for assist.  Barriers to Learning: None. REQUIRES PT FOLLOW UP: Yes  Activity Tolerance  Activity Tolerance: Patient limited by endurance  Activity Tolerance: Pt currently not requiring any extra O2; brief desat 80's although recovers at/above 90% with seated rest.       Patient Diagnosis(es): The primary encounter diagnosis was Community acquired pneumonia of left lower lobe of lung. Diagnoses of Acute respiratory failure with hypoxemia (HCC) and Hyperglycemia were also pertinent to this visit. has a past medical history of Anxiety, Arthritis, Chronic back pain, Depression, Diabetes 1.5, managed as type 2 ([facility] [address] Hypertension, Obesity, and Psoriasis.    has a past surgical history that includes  section and Leg Surgery (Right, 1993). Restrictions  Restrictions/Precautions  Restrictions/Precautions: Fall Risk, Isolation  Position Activity Restriction  Other position/activity restrictions: Droplet Plus Precautions : COVID +. Room Air. Vision/Hearing  Vision: Impaired  Vision Exceptions: Wears glasses for reading  Hearing: Exceptions to [facility]  Hearing Exceptions: Hard of hearing/hearing concerns     Subjective  General  Chart Reviewed: Yes  Patient assessed for rehabilitation services?: Yes  Additional Pertinent Hx: 68 y/o female admit 8/17/2021 with Acute Respiratory, Pneumonia, COVID +. PMH as noted including Arthritis, Chronic Back Pain, Psoriasis, Anxiety, Obesity. Family / Caregiver Present: No  Referring Practitioner: Dr. Sandra Law  Diagnosis: Acute Respiratory, Pneumonia, COVID +. Follows Commands: Within Functional Limits  Subjective  Subjective: Pt agreeable to PT Eval/Rx. Pain Screening  Patient Currently in Pain: Denies          Orientation  Orientation  Overall Orientation Status: Within Functional Limits  Social/Functional History  Social/Functional History  Lives With: Alone  Type of Home: House  Home Layout: Multi-level, Able to Live on Main level with bedroom/bathroom, Laundry in basement (3 levels; doesn't go to 3rd; stays on 2nd most of the day)  Home Access: Stairs to enter with rails (8 steps to enter.)  Entrance Stairs - Number of Steps: 8  Entrance Stairs - Rails: Right  Bathroom Shower/Tub: Tub/Shower unit  Bathroom Toilet: Standard (bedside commode)  Bathroom Equipment: Shower chair  Bathroom Accessibility: Accessible  Home Equipment: Rolling walker, Cane  ADL Assistance: [address] [place] [address] Avenue: Independent  Homemaking Responsibilities: Yes  Ambulation Assistance: Independent (\"Furniture Walks. \")  Transfer Assistance: Independent  Active : Yes  Occupation: Full time employment  Type of occupation: Works for [employer]. Additional Comments: Pt reports x1 fall ~month ago.    Pt reports vertigo from [facility] in eardrum. \"  Cognition   Cognition  Overall Cognitive Status: Exceptions  Arousal/Alertness: Appropriate responses to stimuli  Following Commands: Follows all commands without difficulty  Attention Span: Appears intact  Memory: Appears intact  Safety Judgement: Decreased awareness of need for safety;Decreased awareness of need for assistance  Insights: Decreased awareness of deficits    Objective          AROM RLE (degrees)  RLE AROM: WFL  AROM LLE (degrees)  LLE AROM : WFL  AROM RUE (degrees)  RUE AROM : WFL  AROM LUE (degrees)  LUE AROM : WFL  Strength RLE  Strength RLE: WFL  Strength LLE  Strength LLE: WFL  Strength RUE  Strength RUE: WFL  Strength LUE  Strength LUE: WFL        Bed mobility  Supine to Sit: Unable to assess  Comment: Pt oob prior PT arrival.  Transfers  Sit to Stand: Stand by assistance  Stand to sit: Stand by assistance  Comment: Toilet Transfer SBA. Ambulation  Ambulation?: Yes  Ambulation 1  Surface: level tile  Device: No Device  Distance: Pt amb to/from bathroom without assist device SBA. Wide blake, lat sway and occass \"furniture touch. \"  No specfic LOB noted. Plan   Plan  Times per week: 3-5x week while in acute care setting. Current Treatment Recommendations: Strengthening, Functional Mobility Training, Transfer Training, Gait Training, Safety Education & Training, Patient/Caregiver Education & Training  Safety Devices  Type of devices: Call light within reach, Chair alarm in place, Left in chair, Nurse notified           AM-PAC Score  AM-PAC Inpatient Mobility Raw Score : 17 (08/25/21 1524)  AM-PAC Inpatient T-Scale Score : 42.13 (08/25/21 1524)  Mobility Inpatient CMS 0-100% Score: 50.57 (08/25/21 1524)  Mobility Inpatient CMS G-Code Modifier : CK (08/25/21 1524)          Goals  Short term goals  Time Frame for Short term goals: Upon d/c acute care setting. Short term goal 1: Bed Mob Independent.   Short term goal 2: Transfers with/without assist device Supervision. Short term goal 3: Amb with/without assist device 50-75' Supervision. Patient Goals   Patient goals : Go home.        Therapy Time   Individual Concurrent Group Co-treatment   Time In [phone]         Time Out 1420         Minutes [address] [facility]

## 2021-08-25 NOTE — PROGRESS NOTES
Occupational Therapy   Occupational Therapy Initial Assessment and Tentative D/C    This note to serve as d/c summary should pt d/c prior to next session. Date: 2021   Patient Name: Sd Do  MRN: [id number]     : 1950    Date of Service: 2021    Discharge Recommendations: Sd Do scored a 21/24 on the [facility] ADL Inpatient form. At this time, no further OT is recommended upon discharge due to anticipate pt safe to return home with PRN assist.  Recommend patient returns to prior setting with prior services. Continue to assess pending progress, Home with assist PRN  OT Equipment Recommendations  Equipment Needed: No    Assessment   Performance deficits / Impairments: Decreased functional mobility ; Decreased endurance;Decreased ADL status; Decreased balance;Decreased high-level IADLs;Decreased safe awareness  Assessment: Sd Do is a 69 y.o. female with PMH sniffing for HTN, HLD, and DMII who presents to the emergency department today complaining of cough and fatigue. Onset was 5 days ago. Symptoms started spontaneously and been constant. She has no energy and has not been eating or drinking. She denies chest pain and shortness of breath. No abdominal pain. No headache dizziness numbness or weakness. On arrival to ER she was 86% on room air. PTA pt from home alone where pt was Ind with mobility and ADLs. Pt currently functioning below baseline completing mobility and transfers with supervision/SBA with no device. Without device pt reaching for objects to steady self. Anticipate pt needing up to supervision for ADLs. Pt on RA throughout with SpO2 briefly decreasing into 80s and returning >90% with seated rest break. Pt with some decreased insight into deficits when educated on energy conservation including staying on main floor of home and avoiding stairs. Pt will benefit from skilled OT service at this time.  Anticipate pt safe to return home with PRN assist and progression in therapy. Pt reports having needed AD. Prognosis: Good  Decision Making: Low Complexity  Exam: see above  Assistance / Modification: RW  OT Education: OT Role;Plan of Care;Transfer Training;ADL Adaptive Strategies  REQUIRES OT FOLLOW UP: Yes  Activity Tolerance  Activity Tolerance: Patient Tolerated treatment well  Safety Devices  Safety Devices in place: Yes  Type of devices: Call light within reach;Gait belt;Nurse notified; Left in chair           Patient Diagnosis(es): The primary encounter diagnosis was Community acquired pneumonia of left lower lobe of lung. Diagnoses of Acute respiratory failure with hypoxemia (HCC) and Hyperglycemia were also pertinent to this visit. has a past medical history of Anxiety, Arthritis, Chronic back pain, Depression, Diabetes 1.5, managed as type 2 ([facility] [address] Hypertension, Obesity, and Psoriasis. has a past surgical history that includes  section and Leg Surgery (Right, ). Restrictions  Restrictions/Precautions  Restrictions/Precautions: Fall Risk  Position Activity Restriction  Other position/activity restrictions: RA    Subjective   General  Chart Reviewed: Yes  Patient assessed for rehabilitation services?: Yes  Additional Pertinent Hx: per ED note, Cameron Cast is a 69 y.o. female with PMH sniffing for HTN, HLD, and DMII who presents to the emergency department today complaining of cough and fatigue. Onset was 5 days ago. Symptoms started spontaneously and been constant. She has no energy and has not been eating or drinking. She denies chest pain and shortness of breath. No abdominal pain. No headache dizziness numbness or weakness. On arrival to ER she was 86% on room air. Family / Caregiver Present: No  Referring Practitioner: Rafita Sierra MD  Subjective  Subjective: Pt agreeable to OT evaluation. Pt reports no pain. Pt on RA throughout.      Social/Functional History  Social/Functional History  Lives With: Alone  Type of Home: House  Home Layout: Multi-level, Laundry in basement, Able to Live on Main level with bedroom/bathroom (3 levels; doesn't go to 3rd; stays on 2nd most of the day)  Home Access: Stairs to enter with rails  Entrance Stairs - Number of Steps: 8  Entrance Stairs - Rails: Right  Bathroom Shower/Tub: Tub/Shower unit  Bathroom Toilet: Standard (bedside commode)  Bathroom Equipment: Shower chair  Home Equipment: Rolling walker, Cane  ADL Assistance: Independent  Homemaking Assistance: Independent  Homemaking Responsibilities: Yes  Ambulation Assistance: Independent (\"furniture walks\")  Transfer Assistance: Independent  Active : Yes  Occupation: Full time employment  Type of occupation: [employer]  Additional Comments: reports x1 fall ~month ago; pt reports vertigo from [place] in eardrum\"       Objective   Vision: Impaired  Vision Exceptions: Wears glasses for reading  Hearing: Exceptions to [facility]  Hearing Exceptions: Hard of hearing/hearing concerns    Orientation  Overall Orientation Status: Within Functional Limits  Orientation Level: Oriented X4     Balance  Sitting Balance: Independent  Standing Balance: Supervision  Functional Mobility  Functional - Mobility Device: No device  Activity: To/from bathroom; Other (~25ft x3)  Assist Level: Stand by assistance  Functional Mobility Comments: with no device pt reaching for objects in room; pt reports \"furniture walking\" at home; supervision with use of RW; pt reports history of vertigo  Toilet Transfers  Toilet - Technique: Ambulating  Equipment Used: Grab bars  Toilet Transfer: Supervision  Wheelchair Bed Transfers  Wheelchair/Bed - Technique: Ambulating  Equipment Used:  Other (chair)  Level of Asssistance: Supervision  ADL  Grooming: Supervision (in stance at sink with wash hands)  Toileting: Supervision (able to manage pants and complete pericare)  Additional Comments: Anticipate pt needing up to supervision for ADLs including dressing, bathing, and toileting based on ROM, strength, and balance  Tone RUE  RUE Tone: Normotonic  Tone LUE  LUE Tone: Normotonic  Coordination  Movements Are Fluid And Coordinated: Yes     Bed mobility  Supine to Sit: Unable to assess  Sit to Supine: Unable to assess  Scooting: Unable to assess  Transfers  Sit to stand: Supervision  Stand to sit: Supervision     Cognition  Overall Cognitive Status: Exceptions  Arousal/Alertness: Appropriate responses to stimuli  Following Commands:  Follows all commands without difficulty  Attention Span: Appears intact  Memory: Appears intact  Safety Judgement: Decreased awareness of need for safety;Decreased awareness of need for assistance  Insights: Decreased awareness of deficits                 LUE AROM (degrees)  LUE AROM : WFL  RUE AROM (degrees)  RUE AROM : WFL  LUE Strength  Gross LUE Strength: WFL  RUE Strength  Gross RUE Strength: WFL                   Plan   Plan  Times per week: 3-5x  Current Treatment Recommendations: Endurance Training, Balance Training, Functional Mobility Training, Safety Education & Training, Self-Care / ADL, Patient/Caregiver Education & Training      AM-PAC Score        AM-PAC Inpatient Daily Activity Raw Score: 21 (08/25/21 0803)  AM-PAC Inpatient ADL T-Scale Score : 44.27 (08/25/21 0803)  ADL Inpatient CMS 0-100% Score: 32.79 (08/25/21 0803)  ADL Inpatient CMS G-Code Modifier : Mallika Reynolds (08/25/21 9322)    Goals  Short term goals  Time Frame for Short term goals: prior to D/C  Short term goal 1: complete functional mobility and transfers Ind  Short term goal 2: complete bathing and dressing Ind  Short term goal 3: complete toileting Ind  Short term goal 4: complete grooming in stance at sink Ind  Long term goals  Time Frame for Long term goals : STG=LTG  Patient Goals   Patient goals : return home       Therapy Time   Individual Concurrent Group Co-treatment   Time In 0715         Time Out 0800         Minutes 45         Timed Code Treatment Minutes: 30 Minutes (15 minute eval)       Igor Wagoner, OTR/L

## 2021-08-26 LAB
A/G RATIO: 1.4 (ref 1.1–2.2)
ALBUMIN SERPL-MCNC: 3.1 G/DL (ref 3.4–5)
ALP BLD-CCNC: 59 U/L (ref 40–129)
ALT SERPL-CCNC: 13 U/L (ref 10–40)
ANION GAP SERPL CALCULATED.3IONS-SCNC: 11 MMOL/L (ref 3–16)
AST SERPL-CCNC: 12 U/L (ref 15–37)
BASOPHILS ABSOLUTE: 0 K/UL (ref 0–0.2)
BASOPHILS RELATIVE PERCENT: 0.2 %
BILIRUB SERPL-MCNC: 0.4 MG/DL (ref 0–1)
BUN BLDV-MCNC: 102 MG/DL (ref 7–20)
C-REACTIVE PROTEIN: 4.8 MG/L (ref 0–5.1)
CALCIUM SERPL-MCNC: 9.2 MG/DL (ref 8.3–10.6)
CHLORIDE BLD-SCNC: 101 MMOL/L (ref 99–110)
CO2: 25 MMOL/L (ref 21–32)
CREAT SERPL-MCNC: 1.6 MG/DL (ref 0.6–1.2)
D DIMER: 332 NG/ML DDU (ref 0–229)
EOSINOPHILS ABSOLUTE: 0 K/UL (ref 0–0.6)
EOSINOPHILS RELATIVE PERCENT: 0 %
FERRITIN: 674 NG/ML (ref 15–150)
GFR AFRICAN AMERICAN: 38
GFR NON-AFRICAN AMERICAN: 32
GLOBULIN: 2.2 G/DL
GLUCOSE BLD-MCNC: 155 MG/DL (ref 70–99)
GLUCOSE BLD-MCNC: 184 MG/DL (ref 70–99)
GLUCOSE BLD-MCNC: 190 MG/DL (ref 70–99)
GLUCOSE BLD-MCNC: 53 MG/DL (ref 70–99)
GLUCOSE BLD-MCNC: 84 MG/DL (ref 70–99)
HCT VFR BLD CALC: 39.6 % (ref 36–48)
HEMOGLOBIN: 12.9 G/DL (ref 12–16)
LYMPHOCYTES ABSOLUTE: 0.9 K/UL (ref 1–5.1)
LYMPHOCYTES RELATIVE PERCENT: 4.7 %
MCH RBC QN AUTO: 30.7 PG (ref 26–34)
MCHC RBC AUTO-ENTMCNC: 32.7 G/DL (ref 31–36)
MCV RBC AUTO: 93.9 FL (ref 80–100)
MONOCYTES ABSOLUTE: 1.5 K/UL (ref 0–1.3)
MONOCYTES RELATIVE PERCENT: 7.6 %
NEUTROPHILS ABSOLUTE: 17.3 K/UL (ref 1.7–7.7)
NEUTROPHILS RELATIVE PERCENT: 87.5 %
PDW BLD-RTO: 14.1 % (ref 12.4–15.4)
PERFORMED ON: ABNORMAL
PERFORMED ON: NORMAL
PLATELET # BLD: 564 K/UL (ref 135–450)
PMV BLD AUTO: 8.3 FL (ref 5–10.5)
POTASSIUM REFLEX MAGNESIUM: 5 MMOL/L (ref 3.5–5.1)
PROCALCITONIN: 0.11 NG/ML (ref 0–0.15)
RBC # BLD: 4.22 M/UL (ref 4–5.2)
SODIUM BLD-SCNC: 137 MMOL/L (ref 136–145)
TOTAL PROTEIN: 5.3 G/DL (ref 6.4–8.2)
WBC # BLD: 19.8 K/UL (ref 4–11)

## 2021-08-26 PROCEDURE — 86140 C-REACTIVE PROTEIN: CPT

## 2021-08-26 PROCEDURE — 2060000000 HC ICU INTERMEDIATE R&B

## 2021-08-26 PROCEDURE — 85379 FIBRIN DEGRADATION QUANT: CPT

## 2021-08-26 PROCEDURE — 94669 MECHANICAL CHEST WALL OSCILL: CPT

## 2021-08-26 PROCEDURE — 6360000002 HC RX W HCPCS: Performed by: INTERNAL MEDICINE

## 2021-08-26 PROCEDURE — 94761 N-INVAS EAR/PLS OXIMETRY MLT: CPT

## 2021-08-26 PROCEDURE — 51798 US URINE CAPACITY MEASURE: CPT

## 2021-08-26 PROCEDURE — 6370000000 HC RX 637 (ALT 250 FOR IP): Performed by: INTERNAL MEDICINE

## 2021-08-26 PROCEDURE — 6370000000 HC RX 637 (ALT 250 FOR IP): Performed by: FAMILY MEDICINE

## 2021-08-26 PROCEDURE — 99233 SBSQ HOSP IP/OBS HIGH 50: CPT | Performed by: HOSPITALIST

## 2021-08-26 PROCEDURE — 94640 AIRWAY INHALATION TREATMENT: CPT

## 2021-08-26 PROCEDURE — 97530 THERAPEUTIC ACTIVITIES: CPT

## 2021-08-26 PROCEDURE — 2700000000 HC OXYGEN THERAPY PER DAY

## 2021-08-26 PROCEDURE — 97535 SELF CARE MNGMENT TRAINING: CPT

## 2021-08-26 PROCEDURE — 85025 COMPLETE CBC W/AUTO DIFF WBC: CPT

## 2021-08-26 PROCEDURE — 6370000000 HC RX 637 (ALT 250 FOR IP): Performed by: HOSPITALIST

## 2021-08-26 PROCEDURE — 80053 COMPREHEN METABOLIC PANEL: CPT

## 2021-08-26 PROCEDURE — 84145 PROCALCITONIN (PCT): CPT

## 2021-08-26 PROCEDURE — 36415 COLL VENOUS BLD VENIPUNCTURE: CPT

## 2021-08-26 PROCEDURE — 82728 ASSAY OF FERRITIN: CPT

## 2021-08-26 RX ORDER — INSULIN GLARGINE 100 [IU]/ML
40 INJECTION, SOLUTION SUBCUTANEOUS NIGHTLY
Status: DISCONTINUED | OUTPATIENT
Start: 2021-08-26 | End: 2021-08-26

## 2021-08-26 RX ORDER — FAMOTIDINE 20 MG/1
20 TABLET, FILM COATED ORAL DAILY
Status: DISCONTINUED | OUTPATIENT
Start: 2021-08-26 | End: 2021-08-27 | Stop reason: HOSPADM

## 2021-08-26 RX ORDER — INSULIN GLARGINE 100 [IU]/ML
35 INJECTION, SOLUTION SUBCUTANEOUS NIGHTLY
Status: DISCONTINUED | OUTPATIENT
Start: 2021-08-26 | End: 2021-08-27 | Stop reason: HOSPADM

## 2021-08-26 RX ADMIN — NYSTATIN 500000 UNITS: 100000 SUSPENSION ORAL at 21:07

## 2021-08-26 RX ADMIN — INSULIN LISPRO 22 UNITS: 100 INJECTION, SOLUTION INTRAVENOUS; SUBCUTANEOUS at 12:33

## 2021-08-26 RX ADMIN — FAMOTIDINE 20 MG: 20 TABLET, FILM COATED ORAL at 08:57

## 2021-08-26 RX ADMIN — INSULIN GLARGINE 35 UNITS: 100 INJECTION, SOLUTION SUBCUTANEOUS at 21:08

## 2021-08-26 RX ADMIN — APIXABAN 5 MG: 5 TABLET, FILM COATED ORAL at 08:57

## 2021-08-26 RX ADMIN — INSULIN LISPRO 22 UNITS: 100 INJECTION, SOLUTION INTRAVENOUS; SUBCUTANEOUS at 09:03

## 2021-08-26 RX ADMIN — METOPROLOL TARTRATE 25 MG: 25 TABLET, FILM COATED ORAL at 08:57

## 2021-08-26 RX ADMIN — IPRATROPIUM BROMIDE AND ALBUTEROL 1 PUFF: 20; 100 SPRAY, METERED RESPIRATORY (INHALATION) at 19:56

## 2021-08-26 RX ADMIN — INSULIN LISPRO 3 UNITS: 100 INJECTION, SOLUTION INTRAVENOUS; SUBCUTANEOUS at 17:05

## 2021-08-26 RX ADMIN — NYSTATIN 500000 UNITS: 100000 SUSPENSION ORAL at 08:57

## 2021-08-26 RX ADMIN — IPRATROPIUM BROMIDE AND ALBUTEROL 1 PUFF: 20; 100 SPRAY, METERED RESPIRATORY (INHALATION) at 16:11

## 2021-08-26 RX ADMIN — INSULIN LISPRO 2 UNITS: 100 INJECTION, SOLUTION INTRAVENOUS; SUBCUTANEOUS at 21:08

## 2021-08-26 RX ADMIN — METOPROLOL TARTRATE 25 MG: 25 TABLET, FILM COATED ORAL at 21:07

## 2021-08-26 RX ADMIN — INSULIN LISPRO 22 UNITS: 100 INJECTION, SOLUTION INTRAVENOUS; SUBCUTANEOUS at 17:04

## 2021-08-26 RX ADMIN — IPRATROPIUM BROMIDE AND ALBUTEROL 1 PUFF: 20; 100 SPRAY, METERED RESPIRATORY (INHALATION) at 08:29

## 2021-08-26 RX ADMIN — SODIUM ZIRCONIUM CYCLOSILICATE 5 G: 5 POWDER, FOR SUSPENSION ORAL at 12:11

## 2021-08-26 RX ADMIN — IPRATROPIUM BROMIDE AND ALBUTEROL 1 PUFF: 20; 100 SPRAY, METERED RESPIRATORY (INHALATION) at 12:12

## 2021-08-26 RX ADMIN — METHYLPREDNISOLONE SODIUM SUCCINATE 40 MG: 40 INJECTION, POWDER, FOR SOLUTION INTRAMUSCULAR; INTRAVENOUS at 05:35

## 2021-08-26 RX ADMIN — INSULIN LISPRO 3 UNITS: 100 INJECTION, SOLUTION INTRAVENOUS; SUBCUTANEOUS at 12:33

## 2021-08-26 RX ADMIN — APIXABAN 5 MG: 5 TABLET, FILM COATED ORAL at 21:07

## 2021-08-26 RX ADMIN — NYSTATIN 500000 UNITS: 100000 SUSPENSION ORAL at 17:03

## 2021-08-26 RX ADMIN — PANTOPRAZOLE SODIUM 40 MG: 40 TABLET, DELAYED RELEASE ORAL at 05:35

## 2021-08-26 RX ADMIN — SODIUM ZIRCONIUM CYCLOSILICATE 10 G: 10 POWDER, FOR SUSPENSION ORAL at 01:25

## 2021-08-26 RX ADMIN — NYSTATIN 500000 UNITS: 100000 SUSPENSION ORAL at 12:31

## 2021-08-26 RX ADMIN — METHYLPREDNISOLONE SODIUM SUCCINATE 40 MG: 40 INJECTION, POWDER, FOR SOLUTION INTRAMUSCULAR; INTRAVENOUS at 18:55

## 2021-08-26 ASSESSMENT — PAIN SCALES - GENERAL
PAINLEVEL_OUTOF10: 0

## 2021-08-26 NOTE — PROGRESS NOTES
Office: [phone]       Fax: [phone]      Nephrology Progress Note        Patient's Name: Sd Do  Admit Date: 8/17/2021  Date of Visit: 8/26/2021    Reason for Consult:  SURAJ     Subjective:      Sd Do is a 69 y.o. female with PMHx of hypertension, diabetes mellitus  who was hospitalized on 8/17/2021 with complaints of shortness of breath   Pt was managed for acute resp failure 2/2 COVID pneumonia. She received Remdesivir, steroids, Baricitinib  Creatinine now rising along with BUN, WBC  Had SBP in 90s yesterday  Also had PAF during hospital stay  UO unmeasured. Wt about 5 lb up since admission  NSAID use: Denies   IV contrast: None recent   Home and current meds reviewed . Received Vanc, levaquin. Lisinopril held today    INTERVAL HISTORY    Feels same  Shortness of breath: No   UOP: Good   Creat: trending down  Had urinary retention  ml, st cath done     Medications:    Allergies:  Penicillins    Scheduled Meds:   famotidine  20 mg Oral Daily    sodium zirconium cyclosilicate  5 g Oral Once    [Held by provider] baricitinib  1 mg Oral Daily    insulin glargine  50 Units Subcutaneous Nightly    insulin lispro  22 Units Subcutaneous TID WC    metoprolol tartrate  25 mg Oral BID    apixaban  5 mg Oral BID    albuterol-ipratropium  1 puff Inhalation Q4H WA    methylPREDNISolone sodium  40 mg IntraVENous Q12H    nystatin  5 mL Oral 4x Daily    insulin lispro  0-18 Units Subcutaneous TID WC    insulin lispro  0-9 Units Subcutaneous Nightly    [Held by provider] allopurinol  300 mg Oral Daily    [Held by provider] lisinopril-hydroCHLOROthiazide  1 tablet Oral Daily     Continuous Infusions:   dextrose         Labs:  CBC:   Recent Labs     08/24/21  0633 08/25/21  0522 08/26/21  0621   WBC 18.1* 18.5* 19.8*   HGB 13.7 13.4 12.9   * 474* 564*     Ca/Mg/Phos:   Recent Labs     08/25/21  1326 08/25/21  1903 08/26/21  0621   CALCIUM 8.6 8.4 9.2     UA:  Recent Labs     08/25/21  1246   COLORU YELLOW   CLARITYU Clear   GLUCOSEU 250*   BILIRUBINUR Negative   KETUA Negative   SPECGRAV 1.018   BLOODU Negative   PHUR 6.0   PROTEINU Negative   UROBILINOGEN 0.2   NITRU Negative   LEUKOCYTESUR MODERATE*   LABMICR YES   URINETYPE NotGiven      Urine Microscopic:   Recent Labs     08/25/21  1246   HYALCAST 0   WBCUA 16*   RBCUA 1   EPIU 2     Urine Chemistry:   Recent Labs     08/25/21  1246   LABCREA 49.4         Objective:     Vitals: /71   Pulse 77   Temp 98 °F (36.7 °C)   Resp 16   Ht 5' 5\" (1.651 m)   Wt 250 lb 3.2 oz (113.5 kg)   SpO2 92%   BMI 41.64 kg/m²    Wt Readings from Last 3 Encounters:   08/26/21 250 lb 3.2 oz (113.5 kg)   08/13/21 244 lb (110.7 kg)   04/29/21 259 lb (117.5 kg)      24HR INTAKE/OUTPUT:      Intake/Output Summary (Last 24 hours) at 8/26/2021 8058  Last data filed at 8/26/2021 0536  Gross per 24 hour   Intake 2008.7 ml   Output 3150 ml   Net -1141.3 ml     Constitutional:  awake, NAD  HEENT:  MMM, No icterus  Neck: no bruits, No JVD  Cardiovascular:  reg rhythm  Respiratory: Diminished at bases   Abdomen:  +BS, soft, NT, ND  Ext: + lower extremity edema  CNS: alert, no agitation    IMAGING:  CT CHEST WO CONTRAST   Final Result   Moderate multifocal opacity throughout all lobes bilaterally, consistent with   viral pneumonitis in this patient with history of COVID-19 infection. Trace pleural effusions. Mild mediastinal adenopathy, likely reactive. Bilateral adrenal adenomas. Mild mural thickening of the distal esophagus; correlate for esophagitis. XR CHEST PORTABLE   Final Result   Left mid and bibasilar infiltrates consistent with pneumonia. Assessment :     1.  SURAJ  -Non-Oliguric  -Baseline creat: <1 Now 1->1.2->1.6->2.5  -UA: on admission, conc, pyuria, hyaline cast, prot negative   -Volume: Hypervolemic   -Electrolytes: Hyperkalemia  -Acid-Base: Metabolic alkalosis no ABG  -urinary obstruction. AIN in diff. No uremia    Recent Labs     08/24/21  0633 08/25/21  0522 08/25/21  1326 08/25/21  1903 08/26/21  0621   BUN 80* 121* 113* 105* 102*   CREATININE 1.6* 2.5* 2.7* 2.2* 1.6*     Recent Labs     08/24/21  4943 08/25/21  0522 08/25/21  1326 08/25/21  1903 08/26/21  0621   * 133* 132* 129* 137   K 4.6 5.6* 5.9* 5.9* 5.0   CO2 26 21 20* 14* 25     2. HTN  -Blood pressure low normal    BP Readings from Last 1 Encounters:   08/26/21 114/71       3. DM  -takes metformin at home    Lab Results   Component Value Date    LABA1C 7.5 06/07/2021       4. COVID pneumonia     Plan:     - agree to hold Lisinopril/HCTZ  - Resume lasix in AM  - minimize IVF today unless has post obstructive polyuria  - Lokelma  - change PPI to pepcid  - Monitor BMP    -Monitor I/O, UOP  -Maintain MAP>65  -Avoid nephrotoxin, if able. -Dose meds to current eGFR      Thank you for allowing us to participate in care of [address] [place] [address] . We will continue to follow. Feel free to contact me with any questions.       Gillian Goodmna MD  8/26/2021    [facility] [address] [place] [address]  Office : [phone]  Fax :[phone]

## 2021-08-26 NOTE — ADT AUTH CERT
[facility]ata [facility] #[id number] ([id number]) (:1950 69 y.o. F) (Adm: 21)  [facility] [id number]  PCP    SREEDHAR, [facility]  Demographics  Comment     Last edited by  on  at    Address: Home Phone: Work Phone: Mobile Phone:   [employer] [address] [place] [address]    [phone] -- [phone]   SSN: Insurance: Marital Status: Nondenominational:    [address]  None   Admission Information    Arrival Date/Time: 2021 Admit Date/Time: 2021 IP Adm.  Date/Time: 2021   Admission Type: Emergency Point of Origin: Non-health Care Facility/self Admit Category:    Means of Arrival: [address] [place] [address] Primary Service: Family Medicine Secondary Service: N/A   Transfer Source:  Service Area: [facility]) Unit: [address] Progressive Care   Admit Provider: Fito Fallon MD Attending Provider: Pablito Richter MD Referring Provider:    Patient Diagnoses    Reasons for Admission Coded Admission Diagnoses    *Acute respiratory failure ([address] [J96.00]    Hyperglycemia [R73.9]    Acute respiratory failure with hypoxemia ([address] [J96.01]    Community acquired pneumonia of left lower lobe of lung [J18.9]   Insurance Payors as of 2021    [facility]    Plan: [place] Group: [id number] Member: [id number]   Effective from: 2019 Subscriber: Mike Dalal ID: [id number]   Guarantor: Pat[place] to Patient    Power of  Living Will Clinical Unknown Study Attachment Consent Form ABN Waiver After Visit Summary Lab Result Scan Code Status MyChart Status Advance Care Planning   Not on File Not on File Not on File Not on File Not on File Not on File Filed Not on File FULL [Updated on 21] Pending Jump to the Activity    Auth/Cert Information    Open Auth/Cert linked to [address] [de-identified]   Emergency Contact(s)    Name Relation Home Work [address] Child [phone]   [facility] [phone]     Treasure Adhikari Child   [phone]   Admission Information    Current Information    Attending Provider Admitting Provider Admission Type Admission Status   MD Tarsha Garcia MD Emergency Confirmed Admission          Admission Date/Time Discharge Date Hospital Service Auth/Cert Status   74/25/12  05:17 PM  [address] Unit Room/Bed    [address] PROGRESSIVE [id number] [facility] Account    Name Acct ID Class Status Primary Coverage   Real Lindsey [id number] Inpatient 1906 [facility]          Guarantor Account (for Hospital Account [de-identified])    Name Relation to Pt Service Area Active?  Acct Type   Gerson Falcon [facility] Personal/Family   Address Phone     [address] [facility] [phone](D)            Coverage Information (for Hospital Account [de-identified])    F/O Payor/Plan Precert #   [employer] ADVANTAGE    Subscriber Subscriber #   Gerson Falcon [id number]   Address Phone   [address]   [address] [phone]          Last H&P Note    H&P by Gale Ramirez MD at 8/18/2021 10:59 AM    Author: Gale Ramirez MD Specialty: Internal Medicine Author Type: Physician   Filed: 8/18/2021  2:28 PM Date of Service: 8/18/2021 10:59 AM Status: Signed   : Gale Ramirez MD (Physician)   Gigi English [facility] Medicine History & Physical       PCP: Tarsha Rudd MD     Date of Admission: 8/17/2021     Date of Service: Pt seen/examined on 8/18/2021 and Admitted to Inpatient.     Chief Complaint:  Shortness of breath, cough, fatigue        History Of Present Illness:      The patient is a 69 y.o. female with hx HTN, obesity, HTN, anxiety/depression who presents to [facility] with shortness of breath, cough, and fatigue. Her symptoms began about 5 days ago and have been constant. Appetite has been poor and patient states she has no energy. Denies fever, chills, chest pain, abdominal pain, nausea, vomiting, constipation, diarrhea, and dysuria.     In the ED, labs were significant for a sodium of 128, chloride of 91, pro-BNP of 1,769, glucose of 377. She tested positive for COVID-19. CXR showed left mid and bibasilar infiltrates consistent with pneumonia. CT Chest without contrast showed moderate multifocal opacity throughout all lobes bilaterally, trace pleural effusions, mild mediastinal adenopathy, bilateral adrenal adenomas, and mild mural thickening of the distal esophagus.     Past Medical History:    Past Medical History             Diagnosis Date    Anxiety      Arthritis      Chronic back pain      Depression      Diabetes 1.5, managed as type 2 ([facility]      Hypertension      Obesity      Psoriasis              Past Surgical History:    Past Surgical History             Procedure Laterality Date     SECTION        LEG SURGERY Right             Medications Prior to Admission:    Home Medications           Prior to Admission medications    Medication Sig Start Date End Date Taking?  Authorizing Provider   levoFLOXacin (LEVAQUIN) 500 MG tablet Take 1 tablet by mouth daily for 7 days 21 Yes Narinder Leal MD   predniSONE (DELTASONE) 20 MG tablet 2 tab po daily for 4 days then 1 tab po daily for 3 days 21   Yes Narinder Leal MD   ALPRAZolam (XANAX) 0.5 MG tablet TAKE ONE TABLET BY MOUTH THREE TIMES A DAY AS NEEDED FOR ANXIETY OR SLEEP 21 Yes Narinder Leal MD   nystatin (MYCOSTATIN) 536909 UNIT/ML suspension TAKE FIVE MILLILITERS BY MOUTH FOUR TIMES A DAY FOR 10 DAYS (RETAIN IN MOUTH AS LONG AS POSSIBLE) 21   Yes Layne MD Millicent   HYDROcodone-acetaminophen (NORCO) 5-325 MG per tablet Take 1 tablet by mouth every 8 hours as needed for Pain for up to 30 days. 7/29/21 8/28/21 Yes Shaila Dodson MD   lisinopril-hydroCHLOROthiazide (PRINZIDE;ZESTORETIC) 20-12.5 MG per tablet TAKE ONE TABLET BY MOUTH DAILY 7/16/21   Yes Shaila Dodson MD    Extract 2 % OINT Apply bid 5/3/21   Yes Shaila Dodson MD   furosemide (LASIX) 40 MG tablet Take 1 tablet by mouth 2 times daily 12/1/20   Yes Shaila Dodson MD   metFORMIN (GLUCOPHAGE) 1000 MG tablet TAKE ONE TABLET BY MOUTH TWICE A DAY WITH MEALS 12/1/20   Yes Shaila Dodson MD            Allergies:  Penicillins     Social History:  The patient currently lives at home.     TOBACCO:   reports that she has never smoked. She has never used smokeless tobacco.  ETOH:   reports no history of alcohol use.        Family History:  Reviewed in detail and negative for DM, Early CAD, Cancer, CVA. Positive as follows:     Family History             Problem Relation Age of Onset    High Blood Pressure Mother      High Blood Pressure Father              REVIEW OF SYSTEMS:   Positive for and as noted in the HPI. All other systems reviewed and negative.     PHYSICAL EXAM:     /62   Pulse 77   Temp 97 °F (36.1 °C) (Axillary)   Resp 20   Ht 5' 5\" (1.651 m)   Wt 238 lb 9.6 oz (108.2 kg)   SpO2 91%   BMI 39.71 kg/m²      General appearance: No apparent distress appears stated age and cooperative. HEENT Thrush apparent. Normal cephalic, atraumatic without obvious deformity. Pupils equal, round, and reactive to light. Extra ocular muscles intact. Conjunctivae/corneas clear. Neck: Supple, No jugular venous distention/bruits. Trachea midline without thyromegaly or adenopathy with full range of motion. Lungs: Increased work of breathing, accessory muscle use, lungs clear to auscultation.   Heart: Regular rate and rhythm with Normal S1/S2 without murmurs, rubs or gallops, point of maximum impulse non-displaced  Abdomen: Obese, soft, non-tender or non-distended without rigidity or guarding and positive bowel sounds all four quadrants. Extremities: No clubbing, cyanosis, or edema bilaterally. Full range of motion without deformity and normal gait intact. Skin: Skin color, texture, turgor normal.  No rashes or lesions. Neurologic: Alert and oriented X 3, neurovascularly intact with sensory/motor intact upper extremities/lower extremities, bilaterally. Cranial nerves: II-XII intact, grossly non-focal.  Mental status: Alert, oriented, thought content appropriate. Capillary Refill: Acceptable  < 3 seconds  Peripheral Pulses: +3 Easily felt, not easily obliterated with pressure        CXR:  I have reviewed the CXR with the following interpretation: left mid and bibasilar infiltrates consistent with pneumonia  EKG:  I have reviewed the EKG with the following interpretation: NSR, left axis deviation, left ventricular hypertrophy     CT CHEST WO CONTRAST   Final Result   Moderate multifocal opacity throughout all lobes bilaterally, consistent with   viral pneumonitis in this patient with history of COVID-19 infection.       Trace pleural effusions. Mild mediastinal adenopathy, likely reactive.       Bilateral adrenal adenomas.       Mild mural thickening of the distal esophagus; correlate for esophagitis.           XR CHEST PORTABLE   Final Result   Left mid and bibasilar infiltrates consistent with pneumonia.                    CBC       Recent Labs     08/17/21 1757   WBC 4.8   HGB 12.1   HCT 36.5         RENAL       Recent Labs     08/17/21 1757 08/18/21  0529   *  --    K 4.2  --    CL 91*  --    CO2 23  --    BUN 17  --    CREATININE 1.1 0.7      LFT'S      Recent Labs     08/17/21 1757   AST 23   ALT 17   BILITOT 0.3   ALKPHOS 73      COAG  No results for input(s): INR in the last 72 hours.   CARDIAC ENZYMES      Recent Labs     08/17/21 1757   TROPONINI <0.01       U/A:          Lab Results   Component Value Date     COLORU YELLOW 08/17/2021     WBCUA 6 08/17/2021     RBCUA 2 08/17/2021     CLARITYU Clear 08/17/2021     SPECGRAV 1.014 08/17/2021     LEUKOCYTESUR TRACE 08/17/2021     BLOODU Negative 08/17/2021     GLUCOSEU >=1000 08/17/2021     GLUCOSEU >=1000 06/04/2010         ABG  No results found for: DKT8KEL, BEART, V2QBJESI, PHART, THGBART, FKM3EAZ, PO2ART, RFK3NCE                Active Hospital Problems     Diagnosis Date Noted    Acute respiratory failure ([facility] [J96.00] 08/17/2021            PHYSICIANS CERTIFICATION:     I certify that Lupe Hayes is expected to be hospitalized for more than 2 midnights based on the following assessment and plan:        ASSESSMENT/PLAN:        Pneumonia due to COVID-19 infection  -IV Solumedrol  -Remdesivir x 10 days  -baricitnib x 14 days  -vitamin D supplementation  -empiric IV Vanc and Levaquin to cover for possible superimposed bacterial infection  -MRSA swab  -PPE  -supportive care  -contact plus isolation  -Lovenox BID  -Combivent QID  -trend inflammatory markers     Remdesivir Initiation Note     This patient meets criteria for initiation of remdesivir based on the following:  · Proven COVID-19  · Moderate disease (Requiring supplemental O2)  · Acceptable hepatic function (ALT within 5 times ULN)     Exclusion Criteria:  · Severe disease requiring invasive or non-invasive mechanical ventilation (includes HFNC & BiPAP)  · Could consider use in patients requiring high flow if early on in the disease course (based on symptom duration)  · Use of more than 1 vasopressor prior to remdesivir initiation  · Already improving on supportive treatment and/or impending discharge  · Patients in whom the clinical team think death is in the immediate short-term where remdesivir is unlikely to change the clinical outcome     Liver function tests will be monitored daily while on remdesivir.     Acute respiratory failure with hypoxia - due to COVID-19 infection  -management as above  -wean oxygen to maintain SpO2 > 89%     Type 2 DM  -Lantus  -Humalog  -SSI  -hypoglycemia protocol  -POCT glucose checks     Anxiety and depression  -continue home meds     Essential hypertension  -continue home meds     Obesity - BMI 39.71  -nutritional counseling provided  -weight loss encouraged  -complicating medical management        DVT Prophylaxis: Lovenox BID  Diet: ADULT DIET; Regular; 4 carb choices (60 gm/meal)  Code Status: Full Code  PT/OT Eval Status: defer     Dispo - continue care         Waleska Cordon MD     Thank you Isabelle Mojica MD for the opportunity to be involved in this patient's care. If you have any questions or concerns please feel free to contact me at [phone].       Utilization Reviews       Pneumonia - Care Day 2 (8/18/2021) by Cherie Pizano RN       Review Entered Review Status   8/19/2021 09:03 Completed      Criteria Review      Care Day: 2 Care Date: 8/18/2021 Level of Care:    Guideline Day 2    Level Of Care    (X) Floor    8/19/2021 9:03 AM EDT by Kloudless Gregoria      intermediate    Clinical Status    ( ) * No CO2 retention or acidosis    (X) * No requirement for mechanical ventilation    (X) * Hypotension absent    8/19/2021 9:03 AM EDT by Pinpoint MD      98.1-80-/84    (X) * Afebrile or fever improved    ( ) * No hypoxia on room air or oxygenation improved    (X) * Mental status improved or at baseline    Activity    ( ) * Increased activity    Routes    ( ) Oral hydration    8/19/2021 9:03 AM EDT by Pinpoint MD      IVF @100 ml/hr    ( ) Oral medications    8/19/2021 9:03 AM EDT by Pinpoint MD      IV Solumedrol 40 mg q6, IV Remdesivir 100 mg q24, tylenol 650 mg q4 prn pain x1    Interventions    (X) Pulse oximetry    8/19/2021 9:03 AM EDT by 37coinsjustice Kinney      93%    (X) Possible oxygen    8/19/2021 9:03 AM EDT by Pinpoint MD      Iincreased to 7 liter HFNC    Medications    (X) IV or oral antibiotics    8/19/2021 9:03 AM EDT by Krissy Sanders      IV Levaquin 500 mg q24, IV Vancomycin 1500 mg q18    * Milestone   Additional Notes   08/18/2021   Dyspnea with exertion      crp 239.7   glucose 366. .414..448. Blanchie Be[address] 513   na 135   k+4.9   bun 17, creat 1.0      CT chest-Moderate multifocal opacity throughout all lobes bilaterally, consistent with viral pneumonitis in this patient with history of COVID-19 infection. Trace pleural effusions.  Mild mediastinal adenopathy, likely reactive. Bilateral adrenal adenomas.     Mild mural thickening of the distal esophagus; correlate for esophagitis      MEDS;Combivent 2 puff q4w/a   Allopurinol 300 mg daily    Lovenox 40 mg SC daily    Lantus 10 units Nightly   Humalog SS QID   Humalog 3 units tid with meals    Protonix 40 mg daily       Per Internal Medicine-Lungs: Increased work of breathing, accessory muscle use, lungs clear to auscultation      ASSESSMENT/PLAN:           Pneumonia due to COVID-19 infection   -IV Solumedrol   -Remdesivir x 10 days   -baricitnib x 14 days   -vitamin D supplementation   -empiric IV Vanc and Levaquin to cover for possible superimposed bacterial infection   -MRSA swab   -PPE   -supportive care   -contact plus isolation   -Lovenox BID   -Combivent QID   -trend inflammatory markers       Remdesivir Initiation Note       This patient meets criteria for initiation of remdesivir based on the following:   · Proven COVID-19   · Moderate disease (Requiring supplemental O2)   · Acceptable hepatic function (ALT within 5 times ULN)       Exclusion Criteria:   · Severe disease requiring invasive or non-invasive mechanical ventilation (includes HFNC & BiPAP)   · Could consider use in patients requiring high flow if early on in the disease course (based on symptom duration)   · Use of more than 1 vasopressor prior to remdesivir initiation   · Already improving on supportive treatment and/or impending discharge · Patients in whom the clinical team think death is in the immediate short-term where remdesivir is unlikely to change the clinical outcome       Liver function tests will be monitored daily while on remdesivir.       Acute respiratory failure with hypoxia - due to COVID-19 infection   -management as above   -wean oxygen to maintain SpO2 > 89%       Type 2 DM   -Lantus   -Humalog   -SSI   -hypoglycemia protocol   -POCT glucose checks       Anxiety and depression   -continue home meds       Essential hypertension   -continue home meds       Obesity - BMI 39.71   -nutritional counseling provided   -weight loss encouraged   -complicating medical management           DVT Prophylaxis: Lovenox BID   Diet: ADULT DIET; Regular; 4 carb choices (60 gm/meal)   Code Status: Full Code   PT/OT Eval Status: defer       Dispo - continue care          COVID-19 by General [place] RN       Review Entered Review Status   8/18/2021 15:04 In Primary      Criteria Review   The illness suspected to be related to the Coronavirus (COVID-19)? Yes  Has the member been tested for the COVID-19? Yes   If Yes, what are the results of the COVID-19? Positive  What is the severity of the members condition (i.e. Isolation, Ventilator use)?  Droplet isolation, IV solumedrol, IV remdesivir, IV vanc and levaquin , 5 liter n/c       Pneumonia - Care Day 1 (8/17/2021) by General [place] RN       Review Entered Review Status   8/18/2021 15:00 Completed      Criteria Review      Care Day: 1 Care Date: 8/17/2021 Level of Care:    Guideline Day 1    Level Of Care    (X) ICU or floor    8/18/2021 3:00 PM EDT by Karrie Pinedo      intermediate    Clinical Status    (X) * Clinical Indications met    (X) Possible Fever, dyspnea, purulent sputum, pleuritic pain, Altered mental status    8/18/2021 3:00 PM EDT by Karrie Pinedo      dyspnea    99.3    Routes    (X) Possible IV fluids    8/18/2021 3:00 PM EDT by Jaden Laurent IVF @ 100ml/hr    (X) Parenteral or oral medications    8/18/2021 3:00 PM EDT by Kisha Hidalgo      IV solumedrol 125 mg x1, phenergan with codeine 5 ml x1  in ED    IV solumedrol 40 mg q6    Interventions    (X) WBC    8/18/2021 3:00 PM EDT by Kisha Hidalgo      wbc 4.8  hgb 12.1, hct 36.5  propbnp 1769  troponin < 0.01  na 128  k+4.2  glucose 377  bun 17, creat 1.1    (X) Possible ABG or oximetry    8/18/2021 3:00 PM EDT by Kisha Hidalgo      86% on RA    (X) Blood culture    (X) Possible respiratory therapy    8/18/2021 3:00 PM EDT by Kisha Hidalgo      albuterol 4 puff in ED, duoneb in ed    Medications    (X) IV antibiotics    8/18/2021 3:00 PM EDT by Kisha Hidalgo      IV zithromax 500 mg x1, IV rocephin 1000 mg x1 in ED    IV levaquin 500 mg q24    * Milestone   Additional Notes   08/17/2021   69 y.o. female with PMH sniffing for HTN, HLD, and DMII who presents to the emergency department today complaining of cough and fatigue. Onset was 5 days ago. Symptoms started spontaneously and been constant. She has no energy and has not been eating or drinking. She denies chest pain and shortness of breath. No abdominal pain.  No headache dizziness numbness or weakness. On arrival to ER she was 86% on room air. VS:99.3-106-/51      CXR-Left mid and bibasilar infiltrates consistent with pneumonia.          Physical Exam      General: She is not in acute distress.      Appearance: Normal appearance. She is well-developed. She is morbidly obese. She is not toxic-appearing. HENT: Head: Normocephalic and atraumatic.       Mouth/Throat:       Mouth: Mucous membranes are dry. Eyes: General: No scleral icterus.      Conjunctiva/sclera: Conjunctivae normal.    Neck: Vascular: No JVD. Cardiovascular: Rate and Rhythm: Regular rhythm. Tachycardia present.       Heart sounds: Normal heart sounds.     Pulmonary: Effort: Pulmonary effort is normal. No respiratory distress.       Breath sounds: Rales present. Abdominal: General: There is no distension.       Palpations: Abdomen is soft. Abdomen is not rigid.       Tenderness: There is no abdominal tenderness. Musculoskeletal:General: Normal range of motion.       Cervical back: Full passive range of motion without pain and neck supple. No rigidity.       Right lower leg: Edema present.       Left lower leg: Edema present. Skin: General: Skin is warm and dry.       Capillary Refill: Capillary refill takes less than 2 seconds.       Findings: No rash. Neurological: General: No focal deficit present.       Mental Status: She is alert and oriented to person, place, and time.       Cranial Nerves: Cranial nerves are intact.       Sensory: Sensation is intact.       Motor: Motor function is intact. Psychiatric:  Mood and Affect: Mood normal.          H&P      History Of Present Illness:        The patient is a 69 y.o. female with hx HTN, obesity, HTN, anxiety/depression who presents to [facility] with shortness of breath, cough, and fatigue. Her symptoms began about 5 days ago and have been constant. Appetite has been poor and patient states she has no energy. Denies fever, chills, chest pain, abdominal pain, nausea, vomiting, constipation, diarrhea, and dysuria.       In the ED, labs were significant for a sodium of 128, chloride of 91, pro-BNP of 1,769, glucose of 377. She tested positive for COVID-19. CXR showed left mid and bibasilar infiltrates consistent with pneumonia.  CT Chest without contrast showed moderate multifocal opacity throughout all lobes bilaterally, trace pleural effusions, mild mediastinal adenopathy, bilateral adrenal adenomas, and mild mural thickening of the distal esophagus.       ASSESSMENT/PLAN:           Pneumonia due to COVID-19 infection   -IV Solumedrol   -Remdesivir x 10 days   -baricitnib x 14 days   -vitamin D supplementation   -empiric IV Vanc and Levaquin to cover for possible superimposed bacterial infection   -MRSA swab   -PPE   -supportive care   -contact plus isolation   -Lovenox BID   -Combivent QID   -trend inflammatory markers       Remdesivir Initiation Note       This patient meets criteria for initiation of remdesivir based on the following:   · Proven COVID-19   · Moderate disease (Requiring supplemental O2)   · Acceptable hepatic function (ALT within 5 times ULN)       Exclusion Criteria:   · Severe disease requiring invasive or non-invasive mechanical ventilation (includes HFNC & BiPAP)   · Could consider use in patients requiring high flow if early on in the disease course (based on symptom duration)   · Use of more than 1 vasopressor prior to remdesivir initiation   · Already improving on supportive treatment and/or impending discharge   · Patients in whom the clinical team think death is in the immediate short-term where remdesivir is unlikely to change the clinical outcome       Liver function tests will be monitored daily while on remdesivir.       Acute respiratory failure with hypoxia - due to COVID-19 infection   -management as above   -wean oxygen to maintain SpO2 > 89%       Type 2 DM   -Lantus   -Humalog   -SSI   -hypoglycemia protocol   -POCT glucose checks       Anxiety and depression   -continue home meds       Essential hypertension   -continue home meds       Obesity - BMI 39.71   -nutritional counseling provided   -weight loss encouraged   -complicating medical management           DVT Prophylaxis: Lovenox BID   Diet: ADULT DIET;  Regular; 4 carb choices (60 gm/meal)   Code Status: Full Code   PT/OT Eval Status: defer       Dispo - continue care      Pneumonia - Clinical Indications for Admission to Inpatient Care by Nica New RN       Review Entered Review Status   8/18/2021 14:55 Completed      Criteria Review      Clinical Indications for Admission to Inpatient Care    Most Recent : Krissy Sanders Most Recent Date: 8/18/2021 2:55 PM EDT    (X) Admission is indicated for  1 or more  of the following  [A] [B] (1) (2) (9) (10) (11):       (X) Hypoxemia       8/18/2021 2:55 PM EDT by Jeanine Kohli         86% on RA

## 2021-08-26 NOTE — PROGRESS NOTES
Hospitalist Progress Note      PCP: Narinder Leal MD    Date of Admission: 8/17/2021    Chief Complaint:     Hospital Course:      Subjective: SURAJ and hyperkalemia improving      Medications:  Reviewed    Infusion Medications    dextrose       Scheduled Medications    famotidine  20 mg Oral Daily    sodium zirconium cyclosilicate  5 g Oral Once    [Held by provider] baricitinib  1 mg Oral Daily    insulin glargine  50 Units Subcutaneous Nightly    insulin lispro  22 Units Subcutaneous TID     metoprolol tartrate  25 mg Oral BID    apixaban  5 mg Oral BID    albuterol-ipratropium  1 puff Inhalation Q4H WA    methylPREDNISolone sodium  40 mg IntraVENous Q12H    nystatin  5 mL Oral 4x Daily    insulin lispro  0-18 Units Subcutaneous TID WC    insulin lispro  0-9 Units Subcutaneous Nightly    [Held by provider] allopurinol  300 mg Oral Daily    [Held by provider] lisinopril-hydroCHLOROthiazide  1 tablet Oral Daily     PRN Meds: sodium chloride, glucose, dextrose, glucagon (rDNA), dextrose, perflutren lipid microspheres, acetaminophen, ALPRAZolam, HYDROcodone-acetaminophen      Intake/Output Summary (Last 24 hours) at 8/26/2021 0843  Last data filed at 8/26/2021 0536  Gross per 24 hour   Intake 2008.7 ml   Output 3150 ml   Net -1141.3 ml       Exam:    /71   Pulse 77   Temp 98 °F (36.7 °C)   Resp 16   Ht 5' 5\" (1.651 m)   Wt 250 lb 3.2 oz (113.5 kg)   SpO2 92%   BMI 41.64 kg/m²     General appearance: No apparent distress, appears stated age and cooperative. HEENT: Pupils equal, round, and reactive to light. Conjunctivae/corneas clear. Neck: Supple, with full range of motion. No jugular venous distention. Trachea midline. Respiratory:  Normal respiratory effort. Clear to auscultation, bilaterally without Rales/Wheezes/Rhonchi. Cardiovascular: Regular rate and rhythm with normal S1/S2 without murmurs, rubs or gallops.   Abdomen: Soft, non-tender, non-distended with normal bowel sounds. Musculoskeletal: No clubbing, cyanosis or edema bilaterally. Full range of motion without deformity. Skin: Skin color, texture, turgor normal.  No rashes or lesions. Neurologic:  Neurovascularly intact without any focal sensory/motor deficits. Cranial nerves: II-XII intact, grossly non-focal.  Psychiatric: Alert and oriented, thought content appropriate, normal insight  Capillary Refill: Brisk,< 3 seconds   Peripheral Pulses: +2 palpable, equal bilaterally       Labs:   Recent Labs     08/24/21  0633 08/25/21  0522 08/26/21  0621   WBC 18.1* 18.5* 19.8*   HGB 13.7 13.4 12.9   HCT 41.5 40.5 39.6   * 474* 564*     Recent Labs     08/25/21  1326 08/25/21  1903 08/26/21  0621   * 129* 137   K 5.9* 5.9* 5.0   CL 97* 96* 101   CO2 20* 14* 25   * 105* 102*   CREATININE 2.7* 2.2* 1.6*   CALCIUM 8.6 8.4 9.2     Recent Labs     08/24/21  0633 08/25/21  0522 08/26/21  0621   AST 10* 13* 12*   ALT 12 12 13   BILITOT 0.3 0.3 0.4   ALKPHOS 59 68 59     No results for input(s): INR in the last 72 hours. No results for input(s): Joeline Reeks in the last 72 hours. Urinalysis:      Lab Results   Component Value Date    NITRU Negative 08/25/2021    WBCUA 16 08/25/2021    RBCUA 1 08/25/2021    BLOODU Negative 08/25/2021    SPECGRAV 1.018 08/25/2021    GLUCOSEU 250 08/25/2021    GLUCOSEU >=1000 06/04/2010       Radiology:  CT CHEST WO CONTRAST   Final Result   Moderate multifocal opacity throughout all lobes bilaterally, consistent with   viral pneumonitis in this patient with history of COVID-19 infection. Trace pleural effusions. Mild mediastinal adenopathy, likely reactive. Bilateral adrenal adenomas. Mild mural thickening of the distal esophagus; correlate for esophagitis. XR CHEST PORTABLE   Final Result   Left mid and bibasilar infiltrates consistent with pneumonia.                  Assessment/Plan:    Active Hospital Problems    Diagnosis Date Noted   [employer] acquired pneumonia of left lower lobe of lung [J18.9]     Acute respiratory failure with hypoxemia ([facility] [J96.01]     COVID-19 [U07.1]     Acute respiratory failure ([facility] [J96.00] 08/17/2021       Pneumonia due to COVID-19 infection - improving  -IV Solumedrol  -Remdesivir x 10 days  -baricitnib x 14 days  -vitamin D supplementation  -empiric IV Vanc and Levaquin to cover for possible superimposed bacterial infection  -MRSA swab negative, stopped Vanc  -PPE  -supportive care  -contact plus isolation  -Lovenox BID-> Eliquis with Afib noted  -Combivent QID  -trend inflammatory markers     SURAJ and Hyperkalemia:  improving  - uncertain cause. Could be urinary retention -- was bladder scaned with > 350cc urine-- but has been making urine throughout the day  - s/p insulin, calcium gluconate, sodium bicarb drip  - s/p 1L NS bolus  - nephrology following     Atrial fibrillation with rapid ventricular response  -cardiology consulted, recs appreciated  -PO Lopressor 25 mg BID  -PO Eliquis 5 mg BID  -tele monitoring     Acute respiratory failure with hypoxia - due to COVID-19 infection, improving  -management as above  -wean oxygen to maintain SpO2 > 89%     Type 2 DM - uncontrolled  -Lantus adjusted  -Humalog adjusted  -SSI  -hypoglycemia protocol  -POCT glucose checks     Anxiety and depression  -continue home meds     Essential hypertension  -continue home meds     Obesity - BMI 39.71  -nutritional counseling provided  -weight loss encouraged  -complicating medical management    DVT Prophylaxis: Eliquis  Diet: Adult Oral Nutrition Supplement; Diabetic Oral Supplement  ADULT DIET; Regular; 4 carb choices (60 gm/meal);  Low Potassium (Less than 3000 mg/day)  Code Status: Full Code    PT/OT Eval Status: ordered    Felix Almendarez MD

## 2021-08-26 NOTE — CARE COORDINATION
[facility]  Diabetes Education   Progress Note       NAME:  [address] RECORD NUMBER:  [id number]  AGE: 69 y.o. GENDER: female  : 1950  TODAY'S DATE:  2021    Subjective   Reason for Diabetic Education Evaluation and Assessment: general diabetes support     Telephonic education session. [facility] is eager to make changes in her eating habits and medication taking habits. She is not interested in blood glucose monitoring due to concerns about needles.       Visit Type: evaluation      Silvia Brito is a 69 y.o. female referred by:     [] Physician  [] Nursing  [x] Chart Review   [] Other:     PAST MEDICAL HISTORY        Diagnosis Date    Anxiety     Arthritis     Chronic back pain     Depression     Diabetes 1.5, managed as type 2 ([address]     Hypertension     Obesity     Psoriasis        PAST SURGICAL HISTORY    Past Surgical History:   Procedure Laterality Date     SECTION      LEG SURGERY Right 1993       FAMILY HISTORY    Family History   Problem Relation Age of Onset    High Blood Pressure Mother     High Blood Pressure Father        SOCIAL HISTORY    Social History     Tobacco Use    Smoking status: Never Smoker    Smokeless tobacco: Never Used   Substance Use Topics    Alcohol use: No    Drug use: No       ALLERGIES    Allergies   Allergen Reactions    Penicillins      Pt unsure of reaction       MEDICATIONS     famotidine  20 mg Oral Daily    insulin glargine  35 Units Subcutaneous Nightly    [Held by provider] baricitinib  1 mg Oral Daily    insulin lispro  22 Units Subcutaneous TID WC    metoprolol tartrate  25 mg Oral BID    apixaban  5 mg Oral BID    albuterol-ipratropium  1 puff Inhalation Q4H WA    methylPREDNISolone sodium  40 mg IntraVENous Q12H    nystatin  5 mL Oral 4x Daily    insulin lispro  0-18 Units Subcutaneous TID WC    insulin lispro  0-9 Units Subcutaneous Nightly    [Held by provider] allopurinol 300 mg Oral Daily    [Held by provider] lisinopril-hydroCHLOROthiazide  1 tablet Oral Daily       Objective        Patient Active Problem List   Diagnosis Code    Morbid obesity ([facility] E66.01    Psoriasis L40.9    High cholesterol E78.00    Diabetes 1.5, managed as type 2 ([facility] E13.9    Anxiety F41.9    Sedative, hypnotic or anxiolytic use, unspecified with unspecified sedative, hypnotic or anxiolytic-induced disorder F13.99    Sedative, hypnotic or anxiolytic dependence with unspecified sedative, hypnotic or anxiolytic-induced disorder F13.29    Sedative, hypnotic or anxiolytic dependence, uncomplicated W07.79    Acute respiratory failure ([facility] J96.00    Community acquired pneumonia of left lower lobe of lung J18.9    Acute respiratory failure with hypoxemia (HCC) J96.01    COVID-19 U07.1        /68   Pulse 83   Temp 97.9 °F (36.6 °C)   Resp 16   Ht 5' 5\" (1.651 m)   Wt 250 lb 3.2 oz (113.5 kg)   SpO2 100%   BMI 41.64 kg/m²     HgBA1c:    Lab Results   Component Value Date    LABA1C 7.5 06/07/2021       Recent Labs     08/25/21  1717 08/25/21  2048 08/26/21  0828 08/26/21  1134   POCGLU 340* 273* 84 190*       BUN/Creatinine:    Lab Results   Component Value Date     08/26/2021    CREATININE 1.6 08/26/2021       Assessment        Diabetes Management and Education    Does the patient have a Primary Care Physician? Yes, Howie Schwartz MD       Does the patient require new medication instruction? Yes  Discussed impact of steroids on blood sugar. Level of patient/caregiver understanding able to:       [x] Verbalized Understanding   [] Demonstrated Understanding       [] Teach Back       [] Needs Reinforcement     []  Other:        Does the patient/caregiver monitor Blood Glucoses? No: She is not interested in testing her blood sugar at home. Does the patient/caregiver follow a Meal Plan? No: Rarely cooks at home. Dental issues influence preference for soft foods.  Skips meals.  Drinks soda. Recommend making water, unsweetened tea or coffee primary drinks. She is willing to try "[employer] [facility]. Identified carb containing foods. Reviewed importance of eating three meals per day and plate method for consistent carb intake. Level of patient/caregiver understanding able to:       [x] Verbalized Understanding   [] Demonstrated Understanding       [] Teach Back       [] Needs Reinforcement     []  Other:      Does the patient/caregiver understand S/S of Hypoglycemia? No:                   Does the patient/caregiver understand S/S of Hyperglycemia? No:     Reviewed symptoms, prevention and treatment. Level of patient/caregiver understanding able to:        [] Verbalized Understanding   [] Demonstrated Understanding       [] Teach Back       [x] Needs Reinforcement     []  Other:           Plan        Ongoing diabetes education and blood glucose monitoring. Strong preference to resume Metformin at discharge and is not interested in glucose monitoring.       The following educational and support materials were provided:  · My contact information      · The Diabetes Education Program:  Planning Healthy Meals   · Academy of Nutrition and Dietetics handout - Carbohydrate Counting for People with Diabetes  · Nutrition in the Fast gerson                                           Discharge Plan:  Discharge needs: no prescription needs identified       Teaching Time Diabetes Education:  20 minutes     Electronically signed by Dunia Cedeno on 8/26/2021 at 3:04 PM

## 2021-08-26 NOTE — PLAN OF CARE
Problem: Falls - Risk of:  Goal: Will remain free from falls  Description: Will remain free from falls  Outcome: Met This Shift  Goal: Absence of physical injury  Description: Absence of physical injury  Outcome: Met This Shift   Alert and oriented x4 Resp. Easy and even Sats. WNL on RA Lungs diminished in bases Cough and deep breathing exercises encouraged Medicated x1 for c/o pain with fair effect.  Uses BSC independently Free from fall/injury Frequently turns and repositions self

## 2021-08-26 NOTE — PROGRESS NOTES
Occupational Therapy  [facility]  Daily Treatment Note and Tentative D/C    This note to serve as d/c summary should pt d/c prior to next session. NAME: Kirsty Mcbride  : 1950  MRN: [id number]    Date of Service: 2021    Discharge Recommendations: Kirsty Mcbride scored a 21/24 on the AM-[facility] ADL Inpatient form. At this time, no further OT is recommended upon discharge due to anticipate pt safe to return home with PRN assist.  Recommend patient returns to prior setting with prior services. Continue to assess pending progress, Home with assist PRN  OT Equipment Recommendations  Equipment Needed: No    Assessment   Performance deficits / Impairments: Decreased functional mobility ; Decreased endurance;Decreased ADL status; Decreased balance;Decreased high-level IADLs;Decreased safe awareness  Assessment: Pt tolerated session well. Pt completes functional mobility and transfers with supervision/SBA and no device. Pt reaching for objects to steady self when ambulating. No overt LOB. Pt's SpO2 decreasing into 80s needing seated rest break to return >90%. Pt completes toileting and grooming with supervision. Pt reports no concerns returning home at this time. Continue with POC. Prognosis: Good  OT Education: OT Role;Plan of Care;Transfer Training;ADL Adaptive Strategies  REQUIRES OT FOLLOW UP: Yes  Activity Tolerance  Activity Tolerance: Patient Tolerated treatment well  Safety Devices  Safety Devices in place: Yes  Type of devices: Call light within reach;Gait belt;Nurse notified; Left in chair         Patient Diagnosis(es): The primary encounter diagnosis was Community acquired pneumonia of left lower lobe of lung. Diagnoses of Acute respiratory failure with hypoxemia (HCC) and Hyperglycemia were also pertinent to this visit.       has a past medical history of Anxiety, Arthritis, Chronic back pain, Depression, Diabetes 1.5, managed as type 2 ([facility] Hypertension, Obesity, and Psoriasis. has a past surgical history that includes  section and Leg Surgery (Right, ). Restrictions  Restrictions/Precautions  Restrictions/Precautions: Fall Risk, Isolation  Position Activity Restriction  Other position/activity restrictions: Droplet Plus Precautions : COVID +. Room Air. Subjective   General  Chart Reviewed: Yes  Patient assessed for rehabilitation services?: Yes  Additional Pertinent Hx: per ED note, Truong Cha is a 69 y.o. female with PMH sniffing for HTN, HLD, and DMII who presents to the emergency department today complaining of cough and fatigue. Onset was 5 days ago. Symptoms started spontaneously and been constant. She has no energy and has not been eating or drinking. She denies chest pain and shortness of breath. No abdominal pain. No headache dizziness numbness or weakness. On arrival to ER she was 86% on room air. Family / Caregiver Present: No  Referring Practitioner: Thai Baez MD  Subjective  Subjective: Pt agreeable to OT treatment. Pt with no reports of pain. Pt on RA throughout session. General Comment  Comments: okay for therapy per RN. Orientation  Orientation  Overall Orientation Status: Within Functional Limits  Orientation Level: Oriented X4  Objective    ADL  Grooming: Supervision (in stance at sink to wash hands)  Toileting: Supervision (supervision for balance)        Balance  Sitting Balance: Independent  Standing Balance: Supervision  Standing Balance  Time: 3 minutes  Activity: grooming at sink  Comment: SpO2 decreasing into 80s needing seated rest break to return >90%  Functional Mobility  Functional - Mobility Device: No device  Activity: To/from bathroom; Other (~15ft, 25ft)  Functional Mobility Comments: pt reaching for objects to steady self; SpO2 decreasing into 80s needing seated rest break to return >90%; pt reports nursing has allowed pt to ambulate to/from bathroom although therapy recommending assist x1, pt left RW to use as needed if ambulating to/from bathroom due to reaching for objects  Toilet Transfers  Toilet - Technique: Ambulating  Equipment Used: Grab bars  Toilet Transfer: Supervision  Wheelchair [employer] Transfers  Wheelchair/Bed - Technique: Ambulating  Equipment Used: Bed;Other (bed to chair)  Level of Asssistance: Supervision  Bed mobility  Supine to Sit: Independent  Sit to Supine: Unable to assess  Scooting: Independent  Transfers  Sit to stand: Supervision  Stand to sit: Supervision                       Cognition  Overall Cognitive Status: Exceptions  Arousal/Alertness: Appropriate responses to stimuli  Following Commands:  Follows all commands without difficulty  Attention Span: Appears intact  Memory: Appears intact  Safety Judgement: Decreased awareness of need for safety;Decreased awareness of need for assistance  Insights: Decreased awareness of deficits                       LUE AROM (degrees)  LUE AROM : WFL  RUE AROM (degrees)  RUE AROM : WFL                 Plan   Plan  Times per week: 3-5x  Current Treatment Recommendations: Endurance Training, Balance Training, Functional Mobility Training, Safety Education & Training, Self-Care / ADL, Patient/Caregiver Education & Training    AM-PAC Score        AM-[facility] Inpatient Daily Activity Raw Score: 21 (08/26/21 0828)  -PAC Inpatient ADL T-Scale Score : 44.27 (08/26/21 0828)  ADL Inpatient CMS 0-100% Score: 32.79 (08/26/21 0828)  ADL Inpatient CMS G-Code Modifier : Jamarcus Sanchez (08/26/21 9790)    Goals  Short term goals  Time Frame for Short term goals: prior to D/C; ongoing 8/26  Short term goal 1: complete functional mobility and transfers Ind  Short term goal 2: complete bathing and dressing Ind  Short term goal 3: complete toileting Ind  Short term goal 4: complete grooming in stance at sink Ind  Long term goals  Time Frame for Long term goals : STG=LTG  Patient Goals   Patient goals : return home       Therapy Time   Individual

## 2021-08-27 VITALS
BODY MASS INDEX: 41.84 KG/M2 | HEIGHT: 65 IN | HEART RATE: 87 BPM | DIASTOLIC BLOOD PRESSURE: 81 MMHG | OXYGEN SATURATION: 94 % | WEIGHT: 251.1 LBS | TEMPERATURE: 97.7 F | RESPIRATION RATE: 18 BRPM | SYSTOLIC BLOOD PRESSURE: 145 MMHG

## 2021-08-27 LAB
A/G RATIO: 1.3 (ref 1.1–2.2)
ALBUMIN SERPL-MCNC: 2.9 G/DL (ref 3.4–5)
ALP BLD-CCNC: 67 U/L (ref 40–129)
ALT SERPL-CCNC: 14 U/L (ref 10–40)
ANION GAP SERPL CALCULATED.3IONS-SCNC: 18 MMOL/L (ref 3–16)
AST SERPL-CCNC: 17 U/L (ref 15–37)
BASOPHILS ABSOLUTE: 0 K/UL (ref 0–0.2)
BASOPHILS RELATIVE PERCENT: 0.1 %
BILIRUB SERPL-MCNC: 0.3 MG/DL (ref 0–1)
BUN BLDV-MCNC: 86 MG/DL (ref 7–20)
C-REACTIVE PROTEIN: 3.5 MG/L (ref 0–5.1)
CALCIUM SERPL-MCNC: 9 MG/DL (ref 8.3–10.6)
CHLORIDE BLD-SCNC: 101 MMOL/L (ref 99–110)
CO2: 17 MMOL/L (ref 21–32)
CREAT SERPL-MCNC: 1.5 MG/DL (ref 0.6–1.2)
D DIMER: 317 NG/ML DDU (ref 0–229)
EOSINOPHILS ABSOLUTE: 0 K/UL (ref 0–0.6)
EOSINOPHILS RELATIVE PERCENT: 0 %
FERRITIN: 678.6 NG/ML (ref 15–150)
GFR AFRICAN AMERICAN: 41
GFR NON-AFRICAN AMERICAN: 34
GLOBULIN: 2.2 G/DL
GLUCOSE BLD-MCNC: 135 MG/DL (ref 70–99)
GLUCOSE BLD-MCNC: 174 MG/DL (ref 70–99)
GLUCOSE BLD-MCNC: 228 MG/DL (ref 70–99)
HCT VFR BLD CALC: 39.9 % (ref 36–48)
HEMOGLOBIN: 12.9 G/DL (ref 12–16)
LYMPHOCYTES ABSOLUTE: 0.4 K/UL (ref 1–5.1)
LYMPHOCYTES RELATIVE PERCENT: 2.5 %
MCH RBC QN AUTO: 30.6 PG (ref 26–34)
MCHC RBC AUTO-ENTMCNC: 32.4 G/DL (ref 31–36)
MCV RBC AUTO: 94.6 FL (ref 80–100)
MONOCYTES ABSOLUTE: 1 K/UL (ref 0–1.3)
MONOCYTES RELATIVE PERCENT: 6 %
NEUTROPHILS ABSOLUTE: 14.7 K/UL (ref 1.7–7.7)
NEUTROPHILS RELATIVE PERCENT: 91.4 %
PDW BLD-RTO: 14.5 % (ref 12.4–15.4)
PERFORMED ON: ABNORMAL
PERFORMED ON: ABNORMAL
PLATELET # BLD: 508 K/UL (ref 135–450)
PMV BLD AUTO: 8.2 FL (ref 5–10.5)
POTASSIUM REFLEX MAGNESIUM: 5.4 MMOL/L (ref 3.5–5.1)
RBC # BLD: 4.22 M/UL (ref 4–5.2)
SODIUM BLD-SCNC: 136 MMOL/L (ref 136–145)
TOTAL PROTEIN: 5.1 G/DL (ref 6.4–8.2)
WBC # BLD: 16.1 K/UL (ref 4–11)

## 2021-08-27 PROCEDURE — 86140 C-REACTIVE PROTEIN: CPT

## 2021-08-27 PROCEDURE — 94669 MECHANICAL CHEST WALL OSCILL: CPT

## 2021-08-27 PROCEDURE — 97116 GAIT TRAINING THERAPY: CPT

## 2021-08-27 PROCEDURE — 97530 THERAPEUTIC ACTIVITIES: CPT

## 2021-08-27 PROCEDURE — 6370000000 HC RX 637 (ALT 250 FOR IP): Performed by: INTERNAL MEDICINE

## 2021-08-27 PROCEDURE — 36415 COLL VENOUS BLD VENIPUNCTURE: CPT

## 2021-08-27 PROCEDURE — 6370000000 HC RX 637 (ALT 250 FOR IP): Performed by: HOSPITALIST

## 2021-08-27 PROCEDURE — 85379 FIBRIN DEGRADATION QUANT: CPT

## 2021-08-27 PROCEDURE — 80053 COMPREHEN METABOLIC PANEL: CPT

## 2021-08-27 PROCEDURE — 85025 COMPLETE CBC W/AUTO DIFF WBC: CPT

## 2021-08-27 PROCEDURE — 99233 SBSQ HOSP IP/OBS HIGH 50: CPT | Performed by: HOSPITALIST

## 2021-08-27 PROCEDURE — 82728 ASSAY OF FERRITIN: CPT

## 2021-08-27 PROCEDURE — 94761 N-INVAS EAR/PLS OXIMETRY MLT: CPT

## 2021-08-27 PROCEDURE — 6360000002 HC RX W HCPCS: Performed by: INTERNAL MEDICINE

## 2021-08-27 PROCEDURE — 94680 O2 UPTK RST&XERS DIR SIMPLE: CPT

## 2021-08-27 PROCEDURE — 6370000000 HC RX 637 (ALT 250 FOR IP): Performed by: FAMILY MEDICINE

## 2021-08-27 PROCEDURE — 94640 AIRWAY INHALATION TREATMENT: CPT

## 2021-08-27 RX ORDER — FUROSEMIDE 40 MG/1
40 TABLET ORAL 2 TIMES DAILY
Qty: 60 TABLET | Refills: 3 | Status: SHIPPED | OUTPATIENT
Start: 2021-08-27 | End: 2021-12-04 | Stop reason: SDUPTHER

## 2021-08-27 RX ORDER — LISINOPRIL AND HYDROCHLOROTHIAZIDE 20; 12.5 MG/1; MG/1
TABLET ORAL
Qty: 90 TABLET | Refills: 1 | Status: SHIPPED | OUTPATIENT
Start: 2021-08-27 | End: 2021-12-04 | Stop reason: SDUPTHER

## 2021-08-27 RX ADMIN — IPRATROPIUM BROMIDE AND ALBUTEROL 1 PUFF: 20; 100 SPRAY, METERED RESPIRATORY (INHALATION) at 08:33

## 2021-08-27 RX ADMIN — INSULIN LISPRO 3 UNITS: 100 INJECTION, SOLUTION INTRAVENOUS; SUBCUTANEOUS at 08:50

## 2021-08-27 RX ADMIN — METOPROLOL TARTRATE 25 MG: 25 TABLET, FILM COATED ORAL at 08:49

## 2021-08-27 RX ADMIN — INSULIN LISPRO 22 UNITS: 100 INJECTION, SOLUTION INTRAVENOUS; SUBCUTANEOUS at 08:49

## 2021-08-27 RX ADMIN — APIXABAN 5 MG: 5 TABLET, FILM COATED ORAL at 08:49

## 2021-08-27 RX ADMIN — METHYLPREDNISOLONE SODIUM SUCCINATE 40 MG: 40 INJECTION, POWDER, FOR SOLUTION INTRAMUSCULAR; INTRAVENOUS at 06:55

## 2021-08-27 RX ADMIN — NYSTATIN 500000 UNITS: 100000 SUSPENSION ORAL at 08:49

## 2021-08-27 RX ADMIN — FAMOTIDINE 20 MG: 20 TABLET, FILM COATED ORAL at 08:49

## 2021-08-27 ASSESSMENT — PAIN SCALES - GENERAL: PAINLEVEL_OUTOF10: 0

## 2021-08-27 NOTE — PROGRESS NOTES
Patient discharged to home. Son drove patient home in private vehicle. Patient educated on new medications and carb controlled diet. All questions answered . IV and tele box removed from patient.

## 2021-08-27 NOTE — DISCHARGE INSTR - COC
Continuity of Care Form    Patient Name: Abraham Amaya   :  1950  MRN:  [id number]    Admit date:  2021  Discharge date:  ***    Code Status Order: Full Code   Advance Directives:      Admitting Physician:  Fito Fallon MD  PCP: Fito Fallon MD    Discharging Nurse: [facility] Unit/Room#: Q6N-5932/8004-47  Discharging Unit Phone Number: ***    Emergency Contact:   Extended Emergency Contact Information  Primary Emergency Contact: Boss   Address: [facility], [address] [place] [address]  Home Phone: [phone]  Mobile Phone: [phone]  Relation: Child  Secondary Emergency Contact: Compa Toussaint  Home Phone: [phone]  Relation: Child    Past Surgical History:  Past Surgical History:   Procedure Laterality Date     SECTION      LEG SURGERY Right 1993       Immunization History:   Immunization History   Administered Date(s) Administered    Influenza, High Dose (Fluzone 65 yrs and older) 10/23/2014    Pneumococcal Conjugate 13-valent (Vannessa Gandara) 2017    Pneumococcal Polysaccharide (Taebmviao21) 10/23/2014    Tdap (Boostrix, Adacel) 2016       Active Problems:  Patient Active Problem List   Diagnosis Code    Morbid obesity ([facility] E66.01    Psoriasis L40.9    High cholesterol E78.00    Diabetes 1.5, managed as type 2 ([facility] E13.9    Anxiety F41.9    Sedative, hypnotic or anxiolytic use, unspecified with unspecified sedative, hypnotic or anxiolytic-induced disorder F13.99    Sedative, hypnotic or anxiolytic dependence with unspecified sedative, hypnotic or anxiolytic-induced disorder F13.29    Sedative, hypnotic or anxiolytic dependence, uncomplicated M33.21    Acute respiratory failure ([facility] J96.00    Community acquired pneumonia of left lower lobe of lung J18.9    Acute respiratory failure with hypoxemia ([facility] J96.01    COVID-19 U07.1       Isolation/Infection:   Isolation            Droplet Plus          Patient Infection Status Infection Onset Added Last Indicated Last Indicated By Review Planned Expiration Resolved Resolved By    COVID-19 08/17/21 08/18/21 08/17/21 COVID-19 08/25/21 08/31/21      Resolved    COVID-19 Rule Out 08/17/21 08/17/21 08/17/21 COVID-19 (Ordered)   08/18/21 Rule-Out Test Resulted            Nurse Assessment:  Last Vital Signs: BP (!) 145/81   Pulse 87   Temp 97.7 °F (36.5 °C)   Resp 18   Ht 5' 5\" (1.651 m)   Wt 251 lb 1.7 oz (113.9 kg)   SpO2 94%   BMI 41.79 kg/m²     Last documented pain score (0-10 scale): Pain Level: 0  Last Weight:   Wt Readings from Last 1 Encounters:   08/27/21 251 lb 1.7 oz (113.9 kg)     Mental Status:  {IP PT MENTAL STATUS:20030:::0}    IV Access:  { [place] IV ACCESS:771358458:::0}    Nursing Mobility/ADLs:  Walking   {CHP DME ADLs:426189956:::0}  Transfer  {CHP DME ADLs:062882736:::0}  Bathing  {CHP DME ADLs:985266634:::0}  Dressing  {CHP DME ADLs:424314502:::0}  Toileting  {CHP DME ADLs:169728197:::0}  Feeding  {CHP DME ADLs:102220583:::0}  Med Admin  {CHP DME ADLs:252498666:::0}  Med Delivery   { [place] MED Delivery:327256692:::0}    Wound Care Documentation and Therapy:        Elimination:  Continence: Bowel: {YES / SY:99354}  Bladder: {YES / FW:69150}  Urinary Catheter: {Urinary Catheter:496496470:::0}   Colostomy/Ileostomy/Ileal Conduit: {YES / DI:68056}       Date of Last BM: ***    Intake/Output Summary (Last 24 hours) at 8/27/2021 0916  Last data filed at 8/27/2021 0806  Gross per 24 hour   Intake 720 ml   Output 2200 ml   Net -1480 ml     I/O last 3 completed shifts:   In: 1080 [P.O.:1080]  Out: 1750 [Urine:1750]    Safety Concerns:     [address] [employer] Safety Concerns:577351728:::0}    Impairments/Disabilities:      [address] [employer] Impairments/Disabilities:088823091:::0}    Nutrition Therapy:  Current Nutrition Therapy:   [address] [place] Diet List:812431638:::0}    Routes of Feeding: {CHP DME Other Feedings:511516135:::0}  Liquids: {Slp liquid thickness:78724}  Daily Fluid Restriction: {CHP DME Yes amt example:739989593:::0}  Last Modified Barium Swallow with Video (Video Swallowing Test): {Done Not Done LSWJ:760942299:::1}    Treatments at the Time of Hospital Discharge:   Respiratory Treatments: ***  Oxygen Therapy:  {Therapy; copd oxygen:64594:::0}  Ventilator:    {[facility] Vent List:507599635:::0}    Rehab Therapies: {THERAPEUTIC INTERVENTION:3266788523}  Weight Bearing Status/Restrictions: {[facility] Weight Bearin:::0}  Other Medical Equipment (for information only, NOT a DME order):  {EQUIPMENT:845790709}  Other Treatments: ***    Patient's personal belongings (please select all that are sent with patient):  {CHP DME Belongings:586147820:::0}    RN SIGNATURE:  {Esignature:234418692:::0}    CASE MANAGEMENT/SOCIAL WORK SECTION    Inpatient Status Date: ***    Readmission Risk Assessment Score:  Readmission Risk              Risk of Unplanned Readmission:  20           Discharging to Facility/ Agency   Name:   Address:  Phone:  Fax:    Dialysis Facility (if applicable)   Name:  Address:  Dialysis Schedule:  Phone:  Fax:    / signature: {Esignature:093707640:::0}    PHYSICIAN SECTION    Prognosis: Good    Condition at Discharge: Stable    Rehab Potential (if transferring to Rehab): Good    Recommended Labs or Other Treatments After Discharge:   Home PT and OT:  Evaluate and treat    Physician Certification: I certify the above information and transfer of Esteban Estes  is necessary for the continuing treatment of the diagnosis listed and that she requires Home Care for less 30 days.      Update Admission H&P: No change in H&P    PHYSICIAN SIGNATURE:  Electronically signed by Geovanny Cha MD on 21 at 9:17 AM EDT

## 2021-08-27 NOTE — PLAN OF CARE
Problem: Falls - Risk of:  Goal: Will remain free from falls  Description: Will remain free from falls  Outcome: Met This Shift  Goal: Absence of physical injury  Description: Absence of physical injury  Outcome: Met This Shift   Alert and oriented x4 Dyspnic with exertion at times Lungs diminished Cough and deep breathing exercises encouraged No c/o pain Up to bathroom and chair indep. With steady gait Free from fall/injury.  In good spirits talking about how good she feels Heart rate up at times but does not sustain

## 2021-08-27 NOTE — CARE COORDINATION
Discharge Planning:  SW spoke with pt about d/c planning. SW explained that it is the recommendation of the PT that pt have home PT. Pt stated that she does not feel that she needs this and is not interested in any home care services at this time. Pt stated that her son will be picking her up from the hospital.  Medicare appeal rights again reviewed with pt. Pt stated that she still has the copy of her appeal rights and declined another copy. No other needs noted at this time. Plan: Return home. Pt is refusing HC.   Son to transport pt home at d/c.  Camden Gilford, [facility]  [phone]  Electronically signed by Gideon Dakin on 8/27/2021 at 10:52 AM

## 2021-08-27 NOTE — ADT AUTH CERT
Utilization Reviews       Pneumonia - Care Day 2 (8/18/2021) by Mandy Cordova RN       Review Status Review Entered   Completed 8/19/2021 09:03      Criteria Review      Care Day: 2 Care Date: 8/18/2021 Level of Care:    Guideline Day 2    Level Of Care    (X) Floor    8/19/2021 9:03 AM EDT by Altagracia Matt      intermediate    Clinical Status    ( ) * No CO2 retention or acidosis    (X) * No requirement for mechanical ventilation    (X) * Hypotension absent    8/19/2021 9:03 AM EDT by Altagracia Matt      98.1-80-/84    (X) * Afebrile or fever improved    ( ) * No hypoxia on room air or oxygenation improved    (X) * Mental status improved or at baseline    Activity    ( ) * Increased activity    Routes    ( ) Oral hydration    8/19/2021 9:03 AM EDT by Altagracia Matt      IVF @100 ml/hr    ( ) Oral medications    8/19/2021 9:03 AM EDT by Altagracia Matt      IV Solumedrol 40 mg q6, IV Remdesivir 100 mg q24, tylenol 650 mg q4 prn pain x1    Interventions    (X) Pulse oximetry    8/19/2021 9:03 AM EDT by Altagracia Matt      93%    (X) Possible oxygen    8/19/2021 9:03 AM EDT by Royce Garber Iincreased to 7 liter HFNC    Medications    (X) IV or oral antibiotics    8/19/2021 9:03 AM EDT by Altagracia Matt      IV Levaquin 500 mg q24, IV Vancomycin 1500 mg q18    * Milestone   Additional Notes   08/18/2021   Dyspnea with exertion      crp 239.7   glucose 366. .414..448. Tristan Confer 513   na 135   k+4.9   bun 17, creat 1.0      CT chest-Moderate multifocal opacity throughout all lobes bilaterally, consistent with viral pneumonitis in this patient with history of COVID-19 infection. Trace pleural effusions.  Mild mediastinal adenopathy, likely reactive. Bilateral adrenal adenomas.     Mild mural thickening of the distal esophagus; correlate for esophagitis      MEDS;Combivent 2 puff q4w/a   Allopurinol 300 mg daily    Lovenox 40 mg SC daily    Lantus 10 units Nightly   Humalog SS QID Humalog 3 units tid with meals    Protonix 40 mg daily       Per Internal Medicine-Lungs: Increased work of breathing, accessory muscle use, lungs clear to auscultation              ASSESSMENT/PLAN:           Pneumonia due to COVID-19 infection   -IV Solumedrol   -Remdesivir x 10 days   -baricitnib x 14 days   -vitamin D supplementation   -empiric IV Vanc and Levaquin to cover for possible superimposed bacterial infection   -MRSA swab   -PPE   -supportive care   -contact plus isolation   -Lovenox BID   -Combivent QID   -trend inflammatory markers       Remdesivir Initiation Note       This patient meets criteria for initiation of remdesivir based on the following:   · Proven COVID-19   · Moderate disease (Requiring supplemental O2)   · Acceptable hepatic function (ALT within 5 times ULN)       Exclusion Criteria:   · Severe disease requiring invasive or non-invasive mechanical ventilation (includes HFNC & BiPAP)   · Could consider use in patients requiring high flow if early on in the disease course (based on symptom duration)   · Use of more than 1 vasopressor prior to remdesivir initiation   · Already improving on supportive treatment and/or impending discharge   · Patients in whom the clinical team think death is in the immediate short-term where remdesivir is unlikely to change the clinical outcome       Liver function tests will be monitored daily while on remdesivir.       Acute respiratory failure with hypoxia - due to COVID-19 infection   -management as above   -wean oxygen to maintain SpO2 > 89%       Type 2 DM   -Lantus   -Humalog   -SSI   -hypoglycemia protocol   -POCT glucose checks       Anxiety and depression   -continue home meds       Essential hypertension   -continue home meds       Obesity - BMI 39.71   -nutritional counseling provided   -weight loss encouraged   -complicating medical management           DVT Prophylaxis: Lovenox BID   Diet: ADULT DIET;  Regular; 4 carb choices (60 gm/meal) Code Status: Full Code   PT/OT Eval Status: defer       Dispo - continue care          COVID-19 by General Be, RN       Review Status Review Entered   In Primary 8/18/2021 15:04      Criteria Review   The illness suspected to be related to the Coronavirus (COVID-19)? Yes  Has the member been tested for the COVID-19? Yes   If Yes, what are the results of the COVID-19? Positive  What is the severity of the members condition (i.e. Isolation, Ventilator use)?  Droplet isolation, IV solumedrol, IV remdesivir, IV vanc and levaquin , 5 liter n/c       Pneumonia - Care Day 1 (8/17/2021) by General Be, SITA       Review Status Review Entered   Completed 8/18/2021 15:00      Criteria Review      Care Day: 1 Care Date: 8/17/2021 Level of Care:    Guideline Day 1    Level Of Care    (X) ICU or floor    8/18/2021 3:00 PM EDT by Karrie Pinedo      intermediate    Clinical Status    (X) * Clinical Indications met    (X) Possible Fever, dyspnea, purulent sputum, pleuritic pain, Altered mental status    8/18/2021 3:00 PM EDT by Karrie Pinedo      dyspnea    99.3    Routes    (X) Possible IV fluids    8/18/2021 3:00 PM EDT by Karrie Pinedo      IVF @ 100ml/hr    (X) Parenteral or oral medications    8/18/2021 3:00 PM EDT by Karrie Pinedo      IV solumedrol 125 mg x1, phenergan with codeine 5 ml x1  in ED    IV solumedrol 40 mg q6    Interventions    (X) WBC    8/18/2021 3:00 PM EDT by Karrie Pinedo      wbc 4.8  hgb 12.1, hct 36.5  propbnp 1769  troponin < 0.01  na 128  k+4.2  glucose 377  bun 17, creat 1.1    (X) Possible ABG or oximetry    8/18/2021 3:00 PM EDT by Karrie Pinedo      86% on RA    (X) Blood culture    (X) Possible respiratory therapy    8/18/2021 3:00 PM EDT by Karrie Pinedo      albuterol 4 puff in ED, duoneb in ed    Medications    (X) IV antibiotics    8/18/2021 3:00 PM EDT by Karrie Pinedo      IV zithromax 500 mg x1, IV rocephin 1000 mg x1 in ED    IV levaquin 500 mg q24    * Milestone   Additional Notes   08/17/2021   69 y.o. female with PMH sniffing for HTN, HLD, and DMII who presents to the emergency department today complaining of cough and fatigue. Onset was 5 days ago. Symptoms started spontaneously and been constant. She has no energy and has not been eating or drinking. She denies chest pain and shortness of breath. No abdominal pain.  No headache dizziness numbness or weakness. On arrival to ER she was 86% on room air. VS:99.3-106-/51      CXR-Left mid and bibasilar infiltrates consistent with pneumonia.          Physical Exam      General: She is not in acute distress.      Appearance: Normal appearance. She is well-developed. She is morbidly obese. She is not toxic-appearing. HENT: Head: Normocephalic and atraumatic.       Mouth/Throat:       Mouth: Mucous membranes are dry. Eyes: General: No scleral icterus.      Conjunctiva/sclera: Conjunctivae normal.    Neck: Vascular: No JVD. Cardiovascular: Rate and Rhythm: Regular rhythm. Tachycardia present.       Heart sounds: Normal heart sounds. Pulmonary: Effort: Pulmonary effort is normal. No respiratory distress.       Breath sounds: Rales present. Abdominal: General: There is no distension.       Palpations: Abdomen is soft. Abdomen is not rigid.       Tenderness: There is no abdominal tenderness. Musculoskeletal:General: Normal range of motion.       Cervical back: Full passive range of motion without pain and neck supple. No rigidity.       Right lower leg: Edema present.       Left lower leg: Edema present. Skin: General: Skin is warm and dry.       Capillary Refill: Capillary refill takes less than 2 seconds.       Findings: No rash.     Neurological: General: No focal deficit present.       Mental Status: She is alert and oriented to person, place, and time.       Cranial Nerves: Cranial nerves are intact.       Sensory: Sensation is intact.       Motor: Motor function is intact. Psychiatric:  Mood and Affect: Mood normal.          H&P      History Of Present Illness:        The patient is a 69 y.o. female with hx HTN, obesity, HTN, anxiety/depression who presents to [facility] with shortness of breath, cough, and fatigue. Her symptoms began about 5 days ago and have been constant. Appetite has been poor and patient states she has no energy. Denies fever, chills, chest pain, abdominal pain, nausea, vomiting, constipation, diarrhea, and dysuria.       In the ED, labs were significant for a sodium of 128, chloride of 91, pro-BNP of 1,769, glucose of 377. She tested positive for COVID-19. CXR showed left mid and bibasilar infiltrates consistent with pneumonia.  CT Chest without contrast showed moderate multifocal opacity throughout all lobes bilaterally, trace pleural effusions, mild mediastinal adenopathy, bilateral adrenal adenomas, and mild mural thickening of the distal esophagus.       ASSESSMENT/PLAN:           Pneumonia due to COVID-19 infection   -IV Solumedrol   -Remdesivir x 10 days   -baricitnib x 14 days   -vitamin D supplementation   -empiric IV Vanc and Levaquin to cover for possible superimposed bacterial infection   -MRSA swab   -PPE   -supportive care   -contact plus isolation   -Lovenox BID   -Combivent QID   -trend inflammatory markers       Remdesivir Initiation Note       This patient meets criteria for initiation of remdesivir based on the following:   · Proven COVID-19   · Moderate disease (Requiring supplemental O2)   · Acceptable hepatic function (ALT within 5 times ULN)       Exclusion Criteria:   · Severe disease requiring invasive or non-invasive mechanical ventilation (includes HFNC & BiPAP)   · Could consider use in patients requiring high flow if early on in the disease course (based on symptom duration)   · Use of more than 1 vasopressor prior to remdesivir initiation   · Already improving on supportive treatment and/or impending discharge   · Patients in whom the clinical team think death is in the immediate short-term where remdesivir is unlikely to change the clinical outcome       Liver function tests will be monitored daily while on remdesivir.       Acute respiratory failure with hypoxia - due to COVID-19 infection   -management as above   -wean oxygen to maintain SpO2 > 89%       Type 2 DM   -Lantus   -Humalog   -SSI   -hypoglycemia protocol   -POCT glucose checks       Anxiety and depression   -continue home meds       Essential hypertension   -continue home meds       Obesity - BMI 39.71   -nutritional counseling provided   -weight loss encouraged   -complicating medical management           DVT Prophylaxis: Lovenox BID   Diet: ADULT DIET;  Regular; 4 carb choices (60 gm/meal)   Code Status: Full Code   PT/OT Eval Status: defer       Dispo - continue care      Pneumonia - Clinical Indications for Admission to Inpatient Care by Mandy Cordova RN       Review Status Review Entered   Completed 8/18/2021 14:55      Criteria Review      Clinical Indications for Admission to Inpatient Care    Most Recent : Altagracia Matt Most Recent Date: 8/18/2021 2:55 PM EDT    (X) Admission is indicated for  1 or more  of the following  [A] [B] (1) (2) (9) (10) (11):       (X) Hypoxemia       8/18/2021 2:55 PM EDT by Altagracia Matt         86% on RA

## 2021-08-27 NOTE — PROGRESS NOTES
HOME OXYGEN EVALUATION: ROOM air at rest 94% SpO2; room air with ambulation 93% SpO2 Electronically signed by Remington Esteban RCP on 8/27/2021 at 10:09 AM

## 2021-08-27 NOTE — PROGRESS NOTES
Physical Therapy    Facility/Department: [address] PROGRESSIVE CARE  Treatment Note/Discharge Summary    NAME: Cameron Cast  : 1950  MRN: [id number]    Date of Service: 2021    Discharge Recommendations:  Home with Home health PT   PT Equipment Recommendations  Equipment Needed: No  Other: Would recommend an Alert button. Assessment   Assessment: 70 y/o female admit 2021 with Acute Respiratory, Pneumonia, COVID +. PMH as noted including Arthritis, Chronic Back Pain, Psoriasis, Anxiety, Obesity. PTA pt living alone in multi level home and primarly stays on main level of home; independent daily care and functional mobility. Pt now up ad carmen; amb without assist device and steady gait. Pt would benefit from at least brief  Home PT to ensure safe transition home. Prognosis: Good  Decision Making: Medium Complexity  History: 70 y/o female admit 2021 with Acute Respiratory, Pneumonia, COVID +. PMH as noted including Arthritis, Chronic Back Pain, Psoriasis, Anxiety, Obesity. Exam: See above. Clinical Presentation: See above. Patient Education: Role of PT, POC, Need to call for assist.  Barriers to Learning: None. REQUIRES PT FOLLOW UP: No  Activity Tolerance  Activity Tolerance: Patient Tolerated treatment well       Patient Diagnosis(es): The primary encounter diagnosis was Community acquired pneumonia of left lower lobe of lung. Diagnoses of Acute respiratory failure with hypoxemia (HCC) and Hyperglycemia were also pertinent to this visit. has a past medical history of Anxiety, Arthritis, Chronic back pain, Depression, Diabetes 1.5, managed as type 2 ([facility] [address] Hypertension, Obesity, and Psoriasis. has a past surgical history that includes  section and Leg Surgery (Right, ).     Restrictions  Restrictions/Precautions  Restrictions/Precautions: Isolation, Up as Tolerated  Position Activity Restriction  Other position/activity restrictions: Droplet Plus Precautions : COVID +. Room Air. Vision/Hearing  Vision: Impaired  Vision Exceptions: Wears glasses for reading  Hearing: Exceptions to [facility]  Hearing Exceptions: Hard of hearing/hearing concerns     Subjective  General  Chart Reviewed: Yes  Patient assessed for rehabilitation services?: Yes  Additional Pertinent Hx: 68 y/o female admit 8/17/2021 with Acute Respiratory, Pneumonia, COVID +. PMH as noted including Arthritis, Chronic Back Pain, Psoriasis, Anxiety, Obesity. Family / Caregiver Present: No  Referring Practitioner: Dr. Karine Dias  Diagnosis: Acute Respiratory, Pneumonia, COVID +. Follows Commands: Within Functional Limits  Subjective  Subjective: Pt agreeable to PT Rx. Tomas Chand to go home. Pain Screening  Patient Currently in Pain: Denies          Orientation  Orientation  Overall Orientation Status: Within Functional Limits  Social/Functional History  Social/Functional History  Lives With: Alone  Type of Home: House  Home Layout: Multi-level, Able to Live on Main level with bedroom/bathroom, Laundry in basement (3 levels; doesn't go to 3rd; stays on 2nd most of the day)  Home Access: Stairs to enter with rails (8 steps to enter.)  Entrance Stairs - Number of Steps: 8  Entrance Stairs - Rails: Right  Bathroom Shower/Tub: Tub/Shower unit  Bathroom Toilet: Standard (bedside commode)  Bathroom Equipment: Shower chair  Bathroom Accessibility: Accessible  Home Equipment: Rolling walker, Cane  ADL Assistance: [address]: Independent  Homemaking Responsibilities: Yes  Ambulation Assistance: Independent (\"Furniture Walks. \")  Transfer Assistance: Independent  Active : Yes  Occupation: Full time employment  Type of occupation: Works for [employer]. Additional Comments: Pt reports x1 fall ~month ago. Pt reports vertigo from [facility] in eardrum. \"  Cognition   Cognition  Insights: Decreased awareness of deficits    Objective             Strength RLE  Strength RLE: WFL  Strength LLE  Strength LLE: [facility]  Strength RUE  Strength RUE: WFL  Strength LUE  Strength LUE: WFL        Bed mobility  Supine to Sit: Independent  Transfers  Sit to Stand: Independent  Stand to sit: Independent  Comment: Toilet Transfer Independent. Independent pericare and manage clothing. Ambulation  Ambulation?: Yes  Ambulation 1  Surface: level tile  Device: No Device  Distance: Pt amb to/from bathroom, around hospital room setting ~60' Independently. Mild lat sway, occass \"furniture touch\" although no specific LOB noted. Comments: Pt declines need for assist device. Plan   Plan  Times per week: D/C Acute Care PT Services. Current Treatment Recommendations: Strengthening, Functional Mobility Training, Transfer Training, Gait Training, Safety Education & Training, Patient/Caregiver Education & Training  Safety Devices  Type of devices: Call light within reach, Left in bed, Nurse notified         AM-PAC Score  AM-PAC Inpatient Mobility Raw Score : 23 (08/27/21 0828)  AM-PAC Inpatient T-Scale Score : 56.93 (08/27/21 0828)  Mobility Inpatient CMS 0-100% Score: 11.2 (08/27/21 0828)  Mobility Inpatient CMS G-Code Modifier : CI (08/27/21 7845)          Goals  Short term goals  Time Frame for Short term goals: Upon d/c acute care setting. Short term goal 1: Bed Mob Independent. 8/27 Goal met. Short term goal 2: Transfers with/without assist device Supervision. 8/27 Goal met. Short term goal 3: Amb with/without assist device 50-75' Supervision. 8/27 Goal met. Patient Goals   Patient goals : Go home.        Therapy Time   Individual Concurrent Group Co-treatment   Time In [place] [id number].         Time Out 0715         Minutes [facility]

## 2021-08-27 NOTE — PROGRESS NOTES
Office: [phone]       Fax: [phone]      Nephrology Progress Note        Patient's Name: Fatou Montano Date: 8/17/2021  Date of Visit: 8/27/2021    Reason for Consult:  SURAJ     Subjective:      Parmjit Curtis is a 69 y.o. female with PMHx of hypertension, diabetes mellitus  who was hospitalized on 8/17/2021 with complaints of shortness of breath   Pt was managed for acute resp failure 2/2 COVID pneumonia. She received Remdesivir, steroids, Baricitinib  Creatinine now rising along with BUN, WBC  Had SBP in 90s yesterday  Also had PAF during hospital stay  UO unmeasured. Wt about 5 lb up since admission  NSAID use: Denies   IV contrast: None recent   Home and current meds reviewed . Received Vanc, levaquin. Lisinopril held today    INTERVAL HISTORY    Feels same  Shortness of breath: No   UOP: Good   Creat: trending down  No further urinary retention      Medications:    Allergies:  Penicillins    Scheduled Meds:   famotidine  20 mg Oral Daily    insulin glargine  35 Units Subcutaneous Nightly    [Held by provider] baricitinib  1 mg Oral Daily    insulin lispro  22 Units Subcutaneous TID     metoprolol tartrate  25 mg Oral BID    apixaban  5 mg Oral BID    albuterol-ipratropium  1 puff Inhalation Q4H WA    methylPREDNISolone sodium  40 mg IntraVENous Q12H    nystatin  5 mL Oral 4x Daily    insulin lispro  0-18 Units Subcutaneous TID WC    insulin lispro  0-9 Units Subcutaneous Nightly    [Held by provider] allopurinol  300 mg Oral Daily    [Held by provider] lisinopril-hydroCHLOROthiazide  1 tablet Oral Daily     Continuous Infusions:   dextrose         Labs:  CBC:   Recent Labs     08/25/21  0522 08/26/21  0621 08/27/21  0719   WBC 18.5* 19.8* 16.1*   HGB 13.4 12.9 12.9   * 564* 508*     Ca/Mg/Phos:   Recent Labs 08/25/21  1326 08/25/21  1903 08/26/21  0621   CALCIUM 8.6 8.4 9.2     UA:  Recent Labs     08/25/21  1246   COLORU YELLOW   CLARITYU Clear   GLUCOSEU 250*   BILIRUBINUR Negative   KETUA Negative   SPECGRAV 1.018   BLOODU Negative   PHUR 6.0   PROTEINU Negative   UROBILINOGEN 0.2   NITRU Negative   LEUKOCYTESUR MODERATE*   LABMICR YES   URINETYPE NotGiven      Urine Microscopic:   Recent Labs     08/25/21  1246   HYALCAST 0   WBCUA 16*   RBCUA 1   EPIU 2     Urine Chemistry:   Recent Labs     08/25/21  1246   LABCREA 49.4         Objective:     Vitals: BP (!) 145/81   Pulse 87   Temp 97.7 °F (36.5 °C)   Resp 18   Ht 5' 5\" (1.651 m)   Wt 251 lb 1.7 oz (113.9 kg)   SpO2 94%   BMI 41.79 kg/m²    Wt Readings from Last 3 Encounters:   08/27/21 251 lb 1.7 oz (113.9 kg)   08/13/21 244 lb (110.7 kg)   04/29/21 259 lb (117.5 kg)      24HR INTAKE/OUTPUT:      Intake/Output Summary (Last 24 hours) at 8/27/2021 0858  Last data filed at 8/27/2021 1233  Gross per 24 hour   Intake 720 ml   Output 2200 ml   Net -1480 ml     Constitutional:  awake, NAD  HEENT:  MMM, No icterus  Neck: no bruits, No JVD  Cardiovascular:  reg rhythm  Respiratory: Diminished at bases   Abdomen:  +BS, soft, NT, ND  Ext: + lower extremity edema  CNS: alert, no agitation    IMAGING:  CT CHEST WO CONTRAST   Final Result   Moderate multifocal opacity throughout all lobes bilaterally, consistent with   viral pneumonitis in this patient with history of COVID-19 infection. Trace pleural effusions. Mild mediastinal adenopathy, likely reactive. Bilateral adrenal adenomas. Mild mural thickening of the distal esophagus; correlate for esophagitis. XR CHEST PORTABLE   Final Result   Left mid and bibasilar infiltrates consistent with pneumonia. Assessment :     1.  SURAJ  -Non-Oliguric  -Baseline creat: <1 Now 1->1.2->1.6->2.5-->1.6  -UA: on admission, conc, pyuria, hyaline cast, prot negative   -Volume: Hypervolemic -Electrolytes: Hyperkalemia  -Acid-Base: Metabolic alkalosis no ABG  -urinary obstruction. AIN in diff. No uremia    Recent Labs     08/25/21  0522 08/25/21  1326 08/25/21  1903 08/26/21  0621   * 113* 105* 102*   CREATININE 2.5* 2.7* 2.2* 1.6*     Recent Labs     08/25/21  0522 08/25/21  1326 08/25/21  1903 08/26/21  0621   * 132* 129* 137   K 5.6* 5.9* 5.9* 5.0   CO2 21 20* 14* 25     2. HTN  -Blood pressure low normal    BP Readings from Last 1 Encounters:   08/27/21 (!) 145/81       3. DM  -takes metformin at home    Lab Results   Component Value Date    LABA1C 7.5 06/07/2021       4. COVID pneumonia     Plan:     - agree to hold Lisinopril/HCTZ  - Resume lasix   - Lokelma as needed  - changed PPI to pepcid  - Monitor BMP    -Monitor I/O, UOP  -Maintain MAP>65  -Avoid nephrotoxin, if able. -Dose meds to current eGFR    Spoke to Dr. John Paul Stewart      Thank you for allowing us to participate in care of [address] [place] [address] . We will continue to follow. Feel free to contact me with any questions.       Radha Alan MD  8/27/2021    [facility] [address] [place] [address]  Office : [phone]  Fax :[phone]

## 2021-08-27 NOTE — CARE COORDINATION
DISCHARGE SUMMARY     DATE OF DISCHARGE: 08/27/2021    DISCHARGE DESTINATION: Home      TRANSPORTATION: Son      COMMENTS: Pt is declining home care services.   Jamari Beckman  [phone]  Electronically signed by Norman Pina on 8/27/2021 at 10:55 AM

## 2021-08-30 ENCOUNTER — TELEPHONE (OUTPATIENT)
Dept: NEPHROLOGY | Age: 71
End: 2021-08-30

## 2021-08-30 ENCOUNTER — CARE COORDINATION (OUTPATIENT)
Dept: CASE MANAGEMENT | Age: 71
End: 2021-08-30

## 2021-08-30 DIAGNOSIS — N17.9 AKI (ACUTE KIDNEY INJURY) (HCC): Primary | ICD-10-CM

## 2021-08-30 NOTE — DISCHARGE SUMMARY
39.71  -nutritional counseling provided  -weight loss encouraged  -complicating medical management      Exam:     BP (!) 145/81   Pulse 87   Temp 97.7 °F (36.5 °C)   Resp 18   Ht 5' 5\" (1.651 m)   Wt 251 lb 1.7 oz (113.9 kg)   SpO2 94%   BMI 41.79 kg/m²     General appearance: No apparent distress, appears stated age and cooperative. HEENT: Pupils equal, round, and reactive to light. Conjunctivae/corneas clear. Neck: Supple, with full range of motion. No jugular venous distention. Trachea midline. Respiratory:  Normal respiratory effort. Clear to auscultation, bilaterally without Rales/Wheezes/Rhonchi. Cardiovascular: Regular rate and rhythm with normal S1/S2 without murmurs, rubs or gallops. Abdomen: Soft, non-tender, non-distended with normal bowel sounds. Musculoskelatal: No clubbing, cyanosis or edema bilaterally. Full range of motion without deformity. Skin: Skin color, texture, turgor normal.  No rashes or lesions. Neurologic:  Neurovascularly intact without any focal sensory/motor deficits. Cranial nerves: II-XII intact, grossly non-focal.  Psychiatric: Alert and oriented, thought content appropriate, normal insight      Consults:     IP CONSULT TO PRIMARY CARE PROVIDER  IP CONSULT TO PULMONOLOGY  IP CONSULT TO HOSPITALIST  IP CONSULT TO PHARMACY  IP CONSULT TO PHARMACY  IP CONSULT TO CARDIOLOGY  IP CONSULT TO NEPHROLOGY  IP CONSULT TO HOME CARE NEEDS    Significant Diagnostic Studies:             Radiology:  CT CHEST WO CONTRAST   Final Result   Moderate multifocal opacity throughout all lobes bilaterally, consistent with   viral pneumonitis in this patient with history of COVID-19 infection.       Trace pleural effusions.   Mild mediastinal adenopathy, likely reactive.       Bilateral adrenal adenomas.       Mild mural thickening of the distal esophagus; correlate for esophagitis.           XR CHEST PORTABLE   Final Result   Left mid and bibasilar infiltrates consistent with pneumonia.     PCP/SNF to follow up: SURAJ resolution    Disposition:  Home    Condition on D/C:  stable     Discharge Instructions/Follow-up:  Dr. Felipa Schaefer to f/u with patient about SURAJ and hyperkalemia. Discussed with provider at time of D/C    Code Status:  Prior     Activity: activity as tolerated    Diet: diabetic diet    Labs: For convenience and continuity at follow-up the following most recent labs are provided:      CBC:    Lab Results   Component Value Date    WBC 16.1 08/27/2021    HGB 12.9 08/27/2021    HCT 39.9 08/27/2021     08/27/2021       Renal:    Lab Results   Component Value Date     08/27/2021    K 5.4 08/27/2021     08/27/2021    CO2 17 08/27/2021    BUN 86 08/27/2021    CREATININE 1.5 08/27/2021    CALCIUM 9.0 08/27/2021       Discharge Medications:     Discharge Medication List as of 8/27/2021 11:20 AM           Details   rivaroxaban (XARELTO) 20 MG TABS tablet Take 1 tablet by mouth daily (with breakfast), Disp-30 tablet, R-1Normal      metoprolol tartrate (LOPRESSOR) 25 MG tablet Take 1 tablet by mouth 2 times daily, Disp-60 tablet, R-3Normal              Details   lisinopril-hydroCHLOROthiazide (PRINZIDE;ZESTORETIC) 20-12.5 MG per tablet TAKE ONE TABLET BY MOUTH DAILY  Hold for 1 week post discharge, Disp-90 tablet, R-1Print      furosemide (LASIX) 40 MG tablet Take 1 tablet by mouth 2 times daily Hold for 1 week post discharge, Disp-60 tablet, R-3Print              Details   ALPRAZolam (XANAX) 0.5 MG tablet TAKE ONE TABLET BY MOUTH THREE TIMES A DAY AS NEEDED FOR ANXIETY OR SLEEP, Disp-90 tablet, R-1Normal      nystatin (MYCOSTATIN) 080493 UNIT/ML suspension TAKE FIVE MILLILITERS BY MOUTH FOUR TIMES A DAY FOR 10 DAYS (RETAIN IN MOUTH AS LONG AS POSSIBLE), Disp-1 Bottle, R-0, Normal      HYDROcodone-acetaminophen (NORCO) 5-325 MG per tablet Take 1 tablet by mouth every 8 hours as needed for Pain for up to 30 days. , Disp-70 tablet, R-0Normal      Coal Tar Extract 2 % OINT Apply bid, Disp-60 g, R-2Normal      metFORMIN (GLUCOPHAGE) 1000 MG tablet TAKE ONE TABLET BY MOUTH TWICE A DAY WITH MEALS, Disp-180 tablet,R-1Normal             Time Spent on discharge is more than 45 minutes in the examination, evaluation, counseling and review of medications and discharge plan. Signed: Kalina Yan MD   8/30/2021      Thank you Mao Fleming MD for the opportunity to be involved in this patient's care. If you have any questions or concerns please feel free to contact me at [phone].

## 2021-08-30 NOTE — ADT AUTH CERT
Utilization Reviews       Pneumonia - Care Day 2 (8/18/2021) by Mandy Cordova RN       Review Status Review Entered   Completed 8/19/2021 09:03      Criteria Review      Care Day: 2 Care Date: 8/18/2021 Level of Care:    Guideline Day 2    Level Of Care    (X) Floor    8/19/2021 9:03 AM EDT by Altagracia Matt      intermediate    Clinical Status    ( ) * No CO2 retention or acidosis    (X) * No requirement for mechanical ventilation    (X) * Hypotension absent    8/19/2021 9:03 AM EDT by Altagracia Matt      98.1-80-/84    (X) * Afebrile or fever improved    ( ) * No hypoxia on room air or oxygenation improved    (X) * Mental status improved or at baseline    Activity    ( ) * Increased activity    Routes    ( ) Oral hydration    8/19/2021 9:03 AM EDT by Altagracia Matt      IVF @100 ml/hr    ( ) Oral medications    8/19/2021 9:03 AM EDT by Altagracia Matt      IV Solumedrol 40 mg q6, IV Remdesivir 100 mg q24, tylenol 650 mg q4 prn pain x1    Interventions    (X) Pulse oximetry    8/19/2021 9:03 AM EDT by Altagracia Matt      93%    (X) Possible oxygen    8/19/2021 9:03 AM EDT by Royce Garber Iincreased to 7 liter HFNC    Medications    (X) IV or oral antibiotics    8/19/2021 9:03 AM EDT by Altagracia Matt      IV Levaquin 500 mg q24, IV Vancomycin 1500 mg q18    * Milestone   Additional Notes   08/18/2021   Dyspnea with exertion      crp 239.7   glucose 366. .414..448. Tristan Confer 513   na 135   k+4.9   bun 17, creat 1.0      CT chest-Moderate multifocal opacity throughout all lobes bilaterally, consistent with viral pneumonitis in this patient with history of COVID-19 infection. Trace pleural effusions.  Mild mediastinal adenopathy, likely reactive. Bilateral adrenal adenomas.     Mild mural thickening of the distal esophagus; correlate for esophagitis      MEDS;Combivent 2 puff q4w/a   Allopurinol 300 mg daily    Lovenox 40 mg SC daily    Lantus 10 units Nightly   Humalog SS QID Humalog 3 units tid with meals    Protonix 40 mg daily       Per Internal Medicine-Lungs: Increased work of breathing, accessory muscle use, lungs clear to auscultation      ASSESSMENT/PLAN:           Pneumonia due to COVID-19 infection   -IV Solumedrol   -Remdesivir x 10 days   -baricitnib x 14 days   -vitamin D supplementation   -empiric IV Vanc and Levaquin to cover for possible superimposed bacterial infection   -MRSA swab   -PPE   -supportive care   -contact plus isolation   -Lovenox BID   -Combivent QID   -trend inflammatory markers       Remdesivir Initiation Note       This patient meets criteria for initiation of remdesivir based on the following:   · Proven COVID-19   · Moderate disease (Requiring supplemental O2)   · Acceptable hepatic function (ALT within 5 times ULN)       Exclusion Criteria:   · Severe disease requiring invasive or non-invasive mechanical ventilation (includes HFNC & BiPAP)   · Could consider use in patients requiring high flow if early on in the disease course (based on symptom duration)   · Use of more than 1 vasopressor prior to remdesivir initiation   · Already improving on supportive treatment and/or impending discharge   · Patients in whom the clinical team think death is in the immediate short-term where remdesivir is unlikely to change the clinical outcome       Liver function tests will be monitored daily while on remdesivir.       Acute respiratory failure with hypoxia - due to COVID-19 infection   -management as above   -wean oxygen to maintain SpO2 > 89%       Type 2 DM   -Lantus   -Humalog   -SSI   -hypoglycemia protocol   -POCT glucose checks       Anxiety and depression   -continue home meds       Essential hypertension   -continue home meds       Obesity - BMI 39.71   -nutritional counseling provided   -weight loss encouraged   -complicating medical management           DVT Prophylaxis: Lovenox BID   Diet: ADULT DIET;  Regular; 4 carb choices (60 gm/meal)   Code Status: Full Code   PT/OT Eval Status: defer       Dispo - continue care          COVID-19 by Shailesh Goodwin RN       Review Status Review Entered   In Primary 8/18/2021 15:04      Criteria Review   The illness suspected to be related to the Coronavirus (COVID-19)? Yes  Has the member been tested for the COVID-19? Yes   If Yes, what are the results of the COVID-19? Positive  What is the severity of the members condition (i.e. Isolation, Ventilator use)?  Droplet isolation, IV solumedrol, IV remdesivir, IV vanc and levaquin , 5 liter n/c       Pneumonia - Care Day 1 (8/17/2021) by Shailesh Goodwin RN       Review Status Review Entered   Completed 8/18/2021 15:00      Criteria Review      Care Day: 1 Care Date: 8/17/2021 Level of Care:    Guideline Day 1    Level Of Care    (X) ICU or floor    8/18/2021 3:00 PM EDT by Kisha Hidalgo      intermediate    Clinical Status    (X) * Clinical Indications met    (X) Possible Fever, dyspnea, purulent sputum, pleuritic pain, Altered mental status    8/18/2021 3:00 PM EDT by Kisha Hidalgo      dyspnea    99.3    Routes    (X) Possible IV fluids    8/18/2021 3:00 PM EDT by Kisha Hidalgo      IVF @ 100ml/hr    (X) Parenteral or oral medications    8/18/2021 3:00 PM EDT by Kisha Hidalgo      IV solumedrol 125 mg x1, phenergan with codeine 5 ml x1  in ED    IV solumedrol 40 mg q6    Interventions    (X) WBC    8/18/2021 3:00 PM EDT by Kisha Hidalgo      wbc 4.8  hgb 12.1, hct 36.5  propbnp 1769  troponin < 0.01  na 128  k+4.2  glucose 377  bun 17, creat 1.1    (X) Possible ABG or oximetry    8/18/2021 3:00 PM EDT by Kisha Hidalgo      86% on RA    (X) Blood culture    (X) Possible respiratory therapy    8/18/2021 3:00 PM EDT by Kisha Hidalgo      albuterol 4 puff in ED, duoneb in ed    Medications    (X) IV antibiotics    8/18/2021 3:00 PM EDT by Kisha Hidalgo      IV zithromax 500 mg x1, IV rocephin 1000 mg x1 in ED    IV levaquin 500 mg q24    * Milestone   Additional Notes   08/17/2021   69 y.o. female with PMH sniffing for HTN, HLD, and DMII who presents to the emergency department today complaining of cough and fatigue. Onset was 5 days ago. Symptoms started spontaneously and been constant. She has no energy and has not been eating or drinking. She denies chest pain and shortness of breath. No abdominal pain.  No headache dizziness numbness or weakness. On arrival to ER she was 86% on room air. VS:99.3-106-/51      CXR-Left mid and bibasilar infiltrates consistent with pneumonia.          Physical Exam      General: She is not in acute distress.      Appearance: Normal appearance. She is well-developed. She is morbidly obese. She is not toxic-appearing. HENT: Head: Normocephalic and atraumatic.       Mouth/Throat:       Mouth: Mucous membranes are dry. Eyes: General: No scleral icterus.      Conjunctiva/sclera: Conjunctivae normal.    Neck: Vascular: No JVD. Cardiovascular: Rate and Rhythm: Regular rhythm. Tachycardia present.       Heart sounds: Normal heart sounds. Pulmonary: Effort: Pulmonary effort is normal. No respiratory distress.       Breath sounds: Rales present. Abdominal: General: There is no distension.       Palpations: Abdomen is soft. Abdomen is not rigid.       Tenderness: There is no abdominal tenderness. Musculoskeletal:General: Normal range of motion.       Cervical back: Full passive range of motion without pain and neck supple. No rigidity.       Right lower leg: Edema present.       Left lower leg: Edema present. Skin: General: Skin is warm and dry.       Capillary Refill: Capillary refill takes less than 2 seconds.       Findings: No rash.     Neurological: General: No focal deficit present.       Mental Status: She is alert and oriented to person, place, and time.       Cranial Nerves: Cranial nerves are intact.       Sensory: Sensation is intact.       Motor: Motor function is intact. Psychiatric:  Mood and Affect: Mood normal.          H&P      History Of Present Illness:        The patient is a 69 y.o. female with hx HTN, obesity, HTN, anxiety/depression who presents to [facility] with shortness of breath, cough, and fatigue. Her symptoms began about 5 days ago and have been constant. Appetite has been poor and patient states she has no energy. Denies fever, chills, chest pain, abdominal pain, nausea, vomiting, constipation, diarrhea, and dysuria.       In the ED, labs were significant for a sodium of 128, chloride of 91, pro-BNP of 1,769, glucose of 377. She tested positive for COVID-19. CXR showed left mid and bibasilar infiltrates consistent with pneumonia.  CT Chest without contrast showed moderate multifocal opacity throughout all lobes bilaterally, trace pleural effusions, mild mediastinal adenopathy, bilateral adrenal adenomas, and mild mural thickening of the distal esophagus.       ASSESSMENT/PLAN:           Pneumonia due to COVID-19 infection   -IV Solumedrol   -Remdesivir x 10 days   -baricitnib x 14 days   -vitamin D supplementation   -empiric IV Vanc and Levaquin to cover for possible superimposed bacterial infection   -MRSA swab   -PPE   -supportive care   -contact plus isolation   -Lovenox BID   -Combivent QID   -trend inflammatory markers       Remdesivir Initiation Note       This patient meets criteria for initiation of remdesivir based on the following:   · Proven COVID-19   · Moderate disease (Requiring supplemental O2)   · Acceptable hepatic function (ALT within 5 times ULN)       Exclusion Criteria:   · Severe disease requiring invasive or non-invasive mechanical ventilation (includes HFNC & BiPAP)   · Could consider use in patients requiring high flow if early on in the disease course (based on symptom duration)   · Use of more than 1 vasopressor prior to remdesivir initiation   · Already improving on supportive treatment and/or impending discharge   · Patients in whom the clinical team think death is in the immediate short-term where remdesivir is unlikely to change the clinical outcome       Liver function tests will be monitored daily while on remdesivir.       Acute respiratory failure with hypoxia - due to COVID-19 infection   -management as above   -wean oxygen to maintain SpO2 > 89%       Type 2 DM   -Lantus   -Humalog   -SSI   -hypoglycemia protocol   -POCT glucose checks       Anxiety and depression   -continue home meds       Essential hypertension   -continue home meds       Obesity - BMI 39.71   -nutritional counseling provided   -weight loss encouraged   -complicating medical management           DVT Prophylaxis: Lovenox BID   Diet: ADULT DIET;  Regular; 4 carb choices (60 gm/meal)   Code Status: Full Code   PT/OT Eval Status: defer       Dispo - continue care      Pneumonia - Clinical Indications for Admission to Inpatient Care by Jose Raul Rojas RN       Review Status Review Entered   Completed 8/18/2021 14:55      Criteria Review      Clinical Indications for Admission to Inpatient Care    Most Recent : Mikayla Manjarrez Most Recent Date: 8/18/2021 2:55 PM EDT    (X) Admission is indicated for  1 or more  of the following  [A] [B] (1) (2) (9) (10) (11):       (X) Hypoxemia       8/18/2021 2:55 PM EDT by Mikayla Manjarrez         86% on RA

## 2021-08-31 ENCOUNTER — CARE COORDINATION (OUTPATIENT)
Dept: CASE MANAGEMENT | Age: 71
End: 2021-08-31

## 2021-08-31 DIAGNOSIS — U07.1 COVID-19: Primary | ICD-10-CM

## 2021-08-31 PROCEDURE — 1111F DSCHRG MED/CURRENT MED MERGE: CPT | Performed by: INTERNAL MEDICINE

## 2021-08-31 NOTE — CARE COORDINATION
[facility] Transitions Initial Follow Up Call    Call within 2 business days of discharge: Yes    Patient: Georgie Magaña Patient : 1950   MRN: [id number]  Reason for Admission: PNA d/t COVID+, ARF with hypoxemia, SURAJ, DMII  Discharge Date: 21 RARS: Readmission Risk Score: 20      Last Discharge [facility]       Complaint Diagnosis Description Type Department Provider    21 Cough; Other Community acquired pneumonia of left lower lobe of lung . .. ED to Hosp-Admission (Discharged) (ADMITTED) MIGUEL ANGEL 5N Philip Brandon MD; Ida Machado ... Spoke with: MIRNA Ledbetter 19: [facility]    Non-face-to-face services provided:  Obtained and reviewed discharge summary and/or continuity of care documents    Transitions of Care Initial Call    Was this an external facility discharge? No Discharge Facility: NA    Challenges to be reviewed by the provider   Additional needs identified to be addressed with provider: No  none             Method of communication with provider : phone      Advance Care Planning:   Does patient have an Advance Directive: reviewed and current. Advance Care Planning   Healthcare Decision Maker:    Primary Decision Maker: Matt Learn - [phone]    Primary Decision Maker: Lynnette Babb [facility] [phone]    Was this a readmission? No  Patient stated reason for admission: SOB, cough, fatigue  Patients top risk factors for readmission: medical condition-PNA d/t COVID+, ARF with hypoxemia, SURAJ, DMII    Care Transition Nurse (CTN) contacted the patient by telephone to perform post hospital discharge assessment. Verified name and  with patient as identifiers. Provided introduction to self, and explanation of the CTN role. CTN reviewed discharge instructions, medical action plan and red flags with patient who verbalized understanding.  Patient given an opportunity to ask questions and does not have any further questions or concerns at this time. Were discharge instructions available to patient? Yes. Reviewed appropriate site of care based on symptoms and resources available to patient including: PCP and Specialist. The patient agrees to contact the PCP office for questions related to their healthcare. Medication reconciliation was performed with patient, who verbalizes understanding of administration of home medications. Advised obtaining a 90-day supply of all daily and as-needed medications. Covid Risk Education  Tested positive 2021     Educated patient about risk for severe COVID-19 due to risk factors according to [facility] guidelines. LPN CC reviewed discharge instructions, medical action plan and red flag symptoms with the patient who verbalized understanding. Discussed COVID vaccination status: No. Education provided on COVID-19 vaccination as appropriate. Discussed exposure protocols and quarantine with CDC Guidelines. Patient was given an opportunity to verbalize any questions and concerns and agrees to contact LPN CC or health care provider for questions related to their healthcare. Reviewed and educated patient on any new and changed medications related to discharge diagnosis. Was patient discharged with a pulse oximeter? No Discussed and confirmed pulse oximeter discharge instructions and when to notify provider or seek emergency care. Patient verified  and was pleasant and agreeable to transition calls. States she is \"real good. \" Denies SOB, cough, fever/chills, N/V/D, HA, CP. Denies palpitations, tremors, dizziness. States appetite is fine. Denies problems with bladder. States she was constipated, but drank prune juice and it is resolving. Does not have pulse ox to check O2. LPN CC discussed they were available at pharmacies and goal is above 90%. Patient verbalized understanding. Patient states she has not taken her glucose. LPN CC educated importance of checking routinely.  Patient states it's usually fine. Reports decreasing edema. States she is doing breathing activities as directed. Complains of bilat leg aches. States she is eating lots of fruit. Temp 97 via temporal thermometer. Full medication reconciliation and 1111f completed. Cardiology f/u 9/22. LPN CC discussed that nephrology wanted to f/u with patient and gave number for Dr. Jessica Marcelo. Patient appreciative of information, states she will call. Denies any acute needs at present time. Agreeable to f/u calls. Educated on the use of urgent care or physicians 24 hr access line if assistance is needed after hours. LPN CC provided contact information. Plan for follow-up call in 5-7 days based on severity of symptoms and risk factors.   Brigida Graves LPN [address] [place] [address]  [phone]    Care Transitions 24 Hour Call    Do you have any ongoing symptoms?: No  Do you have a copy of your discharge instructions?: Yes  Do you have all of your prescriptions and are they filled?: Yes  Have you been contacted by a [address]?: No  Have you scheduled your follow up appointment?: Yes  How are you going to get to your appointment?: Car - family or friend to transport  Were you discharged with any Home Care or Post Acute Services: No  Do you have support at home?: Alone  Do you feel like you have everything you need to keep you well at home?: Yes  Are you an active caregiver in your home?: No  Care Transitions Interventions         Follow Up  Future Appointments   Date Time Provider Maria Elena Vizcarra   9/22/2021 11:30 AM Diaz Hull MD [address] utjorge Mayes, LPN

## 2021-09-08 ENCOUNTER — CARE COORDINATION (OUTPATIENT)
Dept: CASE MANAGEMENT | Age: 71
End: 2021-09-08

## 2021-09-08 NOTE — CARE COORDINATION
[facility] Transitions Follow Up Call    2021    Patient: Shayy Bledsoe  Patient : 1950   MRN: [id number]  Reason for Admission: PNA d/t COVID+, ARF with hypoxemia, SURAJ, DMII  Discharge Date: 21 RARS: Readmission Risk Score: 21         Spoke with: NA    Attempted to reach patient via phone for transition call. VM left stating purpose of call along with my contact information requesting a return call. Lencho Smith LPN [address] [place] [address]  Care Transitions  [phone]    Care Transitions Subsequent and Final Call    Subsequent and Final Calls  Do you currently have any active services?: No  Care Transitions Interventions  Other Interventions:            Follow Up  Future Appointments   Date Time Provider Maria Elena Vizcarra   2021 11:30 AM Ibeth Garcia MD [facility]       Lencho Smith LPN

## 2021-09-17 ENCOUNTER — CARE COORDINATION (OUTPATIENT)
Dept: CASE MANAGEMENT | Age: 71
End: 2021-09-17

## 2021-09-17 NOTE — CARE COORDINATION
[facility] Transitions Follow Up Call    2021    Patient: Gloria Soliman  Patient : 1950   MRN: <[id number]>  Reason for Admission: PNA d/t COVID+, ARF with hypoxemia, SURAJ, DMII  Discharge Date: 21 RARS: Readmission Risk Score: 20    Spoke with: jenaro    [facility] attempted outreach for care transition follow up call. Left HIPPA compliant message and contact information for call back. Care Transitions Subsequent and Final Call    Subsequent and Final Calls  Care Transitions Interventions  Other Interventions:            Follow Up  Future Appointments   Date Time Provider Maria Elena Vizcarra   2021 11:30 AM Jany Mathis MD [facility]       Joann Azul LPN

## 2021-09-27 ENCOUNTER — CARE COORDINATION (OUTPATIENT)
Dept: CASE MANAGEMENT | Age: 71
End: 2021-09-27

## 2021-09-27 NOTE — CARE COORDINATION
[facility] Transitions Follow Up Call    2021    Patient: Cynthia Crowley  Patient : 1950   MRN: <[id number]>  Reason for Admission: PNA d/t COVID+, ARF with hypoxemia, SURAJ, DMII  Discharge Date: 21 RARS: Readmission Risk Score: 20    Spoke with: Cynthia Crowley who reports that she is doing very very good. Patient states that her O2 is good and DM2 is has been good. Writer ask for values no values given. Patient reports that she is finally got the energy to do things now. States that she is not just falling the sleep all the time. Patient reports that she is taking all medication as ordered. Patient denies any ongoing symptoms. Patient denies cp, sob, cough, dizziness, headache, n/v, diarrhea, abdominal pains, fever, or chills. Patient report that appetite and fluid intake is good and denies any problems with bowel or bladder. Denies any acute needs at present time. Patient instructed to continue to monitor for any of the above s/s, reporting any that may present to MD immediately. Reminded of COVID 19 precautions. No further outreach is needed. Educated on the use of urgent care or physicians 24 hr access line if assistance is needed after hours. Care Transitions Subsequent and Final Call    Subsequent and Final Calls  Care Transitions Interventions  Other Interventions:            Follow Up  Future Appointments   Date Time Provider Maria Elena Vizcarra   2021  3:00 PM ELENO Murphy - CNP [facility]       Nadeem Bunn LPN

## 2022-03-05 PROBLEM — E11.65 TYPE 2 DIABETES MELLITUS WITH HYPERGLYCEMIA, UNSPECIFIED WHETHER LONG TERM INSULIN USE (HCC): Status: ACTIVE | Noted: 2022-03-05

## 2022-03-05 PROBLEM — I48.91 ATRIAL FIBRILLATION, UNSPECIFIED TYPE (HCC): Status: ACTIVE | Noted: 2022-03-05

## 2022-03-05 PROBLEM — E11.9 TYPE 2 DIABETES MELLITUS (HCC): Status: ACTIVE | Noted: 2022-03-05

## 2022-08-04 PROBLEM — I50.22 CHRONIC SYSTOLIC (CONGESTIVE) HEART FAILURE (HCC): Status: ACTIVE | Noted: 2022-08-04

## 2022-08-04 PROBLEM — N18.30 CHRONIC RENAL DISEASE, STAGE III (HCC): Status: ACTIVE | Noted: 2022-08-04

## 2022-12-22 PROBLEM — F11.20 OPIOID DEPENDENCE WITH CURRENT USE (HCC): Status: ACTIVE | Noted: 2022-12-22

## 2023-11-10 ENCOUNTER — HOSPITAL ENCOUNTER (EMERGENCY)
Age: 73
Discharge: HOME | End: 2023-11-10
Payer: MEDICARE

## 2023-11-10 VITALS
BODY MASS INDEX: 40.29 KG/M2 | WEIGHT: 241.84 LBS | DIASTOLIC BLOOD PRESSURE: 80 MMHG | TEMPERATURE: 97.6 F | SYSTOLIC BLOOD PRESSURE: 162 MMHG | HEART RATE: 92 BPM | OXYGEN SATURATION: 97 % | RESPIRATION RATE: 18 BRPM | HEIGHT: 65 IN

## 2023-11-10 DIAGNOSIS — T78.40XA ALLERGIC REACTION, INITIAL ENCOUNTER: Primary | ICD-10-CM

## 2023-11-10 DIAGNOSIS — R21 RASH AND OTHER NONSPECIFIC SKIN ERUPTION: ICD-10-CM

## 2023-11-10 DIAGNOSIS — H65.91 RIGHT NON-SUPPURATIVE OTITIS MEDIA: ICD-10-CM

## 2023-11-10 DIAGNOSIS — T50.905A ADVERSE EFFECT OF DRUG, INITIAL ENCOUNTER: ICD-10-CM

## 2023-11-10 PROCEDURE — 99283 EMERGENCY DEPT VISIT LOW MDM: CPT

## 2023-11-10 PROCEDURE — 6370000000 HC RX 637 (ALT 250 FOR IP): Performed by: PHYSICIAN ASSISTANT

## 2023-11-10 RX ORDER — CEFDINIR 300 MG/1
300 CAPSULE ORAL ONCE
Status: COMPLETED | OUTPATIENT
Start: 2023-11-10 | End: 2023-11-10

## 2023-11-10 RX ORDER — CEFDINIR 300 MG/1
300 CAPSULE ORAL 2 TIMES DAILY
Qty: 14 CAPSULE | Refills: 0 | Status: SHIPPED | OUTPATIENT
Start: 2023-11-10 | End: 2023-11-17

## 2023-11-10 RX ORDER — METHYLPREDNISOLONE 4 MG/1
TABLET ORAL
Qty: 1 KIT | Refills: 0 | Status: SHIPPED | OUTPATIENT
Start: 2023-11-10 | End: 2023-11-16

## 2023-11-10 RX ORDER — FAMOTIDINE 20 MG/1
20 TABLET, FILM COATED ORAL 2 TIMES DAILY
Qty: 60 TABLET | Refills: 3 | Status: SHIPPED | OUTPATIENT
Start: 2023-11-10

## 2023-11-10 RX ADMIN — CEFDINIR 300 MG: 300 CAPSULE ORAL at 13:14

## 2023-11-10 ASSESSMENT — PATIENT HEALTH QUESTIONNAIRE - PHQ9
1. LITTLE INTEREST OR PLEASURE IN DOING THINGS: 0
SUM OF ALL RESPONSES TO PHQ QUESTIONS 1-9: 0
SUM OF ALL RESPONSES TO PHQ QUESTIONS 1-9: 0
2. FEELING DOWN, DEPRESSED OR HOPELESS: 0
SUM OF ALL RESPONSES TO PHQ9 QUESTIONS 1 & 2: 0
SUM OF ALL RESPONSES TO PHQ QUESTIONS 1-9: 0
SUM OF ALL RESPONSES TO PHQ QUESTIONS 1-9: 0

## 2023-11-10 ASSESSMENT — PAIN - FUNCTIONAL ASSESSMENT
PAIN_FUNCTIONAL_ASSESSMENT: NONE - DENIES PAIN
PAIN_FUNCTIONAL_ASSESSMENT: NONE - DENIES PAIN

## 2023-11-10 ASSESSMENT — LIFESTYLE VARIABLES: HOW OFTEN DO YOU HAVE A DRINK CONTAINING ALCOHOL: NEVER

## 2023-11-14 NOTE — ED PROVIDER NOTES
worsen      DISCHARGE MEDICATIONS:  Discharge Medication List as of 11/10/2023  1:15 PM        START taking these medications    Details   cefdinir (OMNICEF) 300 MG capsule Take 1 capsule by mouth 2 times daily for 7 days, Disp-14 capsule, R-0Normal      nystatin (MYCOSTATIN) 765210 UNIT/ML suspension Take 5 mLs by mouth 4 times daily for 10 days Retain in mouth as long as possible, Oral, 4 TIMES DAILY Starting Fri 11/10/2023, Until Mon 11/20/2023, For 10 days, Disp-200 mL, R-0, Normal      famotidine (PEPCID) 20 MG tablet Take 1 tablet by mouth 2 times daily, Disp-60 tablet, R-3Normal      methylPREDNISolone (MEDROL, DARRYL,) 4 MG tablet Take by mouth., Disp-1 kit, R-0Normal             DISCONTINUED MEDICATIONS:  Discharge Medication List as of 11/10/2023  1:15 PM                 (Please note that portions of this note were completed with a voice recognition program.  Efforts were made to edit the dictations but occasionally words are mis-transcribed.)    Therese Tanner PA-C (electronically signed)            Therese Tanner PA-C  11/14/23 7973

## 2023-11-20 ENCOUNTER — HOSPITAL ENCOUNTER (EMERGENCY)
Age: 73
Discharge: HOME | End: 2023-11-20

## 2023-11-20 NOTE — ED NOTES
Wrong patient info inserted into pt chart. This RN deleted wrong information.       Tyrone Del Rosario, RN  11/20/23 1648

## 2023-11-20 NOTE — ED NOTES
Followed up with Learta Form on 11/20/2023 at 5:21 PM. Patient left the ED with a disposition of LWBS before triage on . Patient called x 3 from lobby with no response.  SITA Masters RN  11/20/23 9670

## 2023-12-08 ENCOUNTER — APPOINTMENT (OUTPATIENT)
Dept: GENERAL RADIOLOGY | Age: 73
DRG: 309 | End: 2023-12-08
Payer: MEDICARE

## 2023-12-08 ENCOUNTER — HOSPITAL ENCOUNTER (INPATIENT)
Age: 73
LOS: 3 days | Discharge: HOME | DRG: 309 | End: 2023-12-11
Attending: EMERGENCY MEDICINE | Admitting: INTERNAL MEDICINE
Payer: MEDICARE

## 2023-12-08 DIAGNOSIS — R93.89 ABNORMAL CXR: ICD-10-CM

## 2023-12-08 DIAGNOSIS — R79.89 TROPONIN LEVEL ELEVATED: ICD-10-CM

## 2023-12-08 DIAGNOSIS — R79.89 ELEVATED BRAIN NATRIURETIC PEPTIDE (BNP) LEVEL: ICD-10-CM

## 2023-12-08 DIAGNOSIS — N28.9 RENAL INSUFFICIENCY: ICD-10-CM

## 2023-12-08 DIAGNOSIS — R00.1 SYMPTOMATIC BRADYCARDIA: Primary | ICD-10-CM

## 2023-12-08 LAB
ANION GAP SERPL CALCULATED.3IONS-SCNC: 18 MMOL/L (ref 3–16)
BASE EXCESS BLDV CALC-SCNC: -3.9 MMOL/L
BASOPHILS # BLD: 0 K/UL (ref 0–0.2)
BASOPHILS NFR BLD: 0.6 %
BUN SERPL-MCNC: 37 MG/DL (ref 7–20)
CALCIUM SERPL-MCNC: 9.2 MG/DL (ref 8.3–10.6)
CHLORIDE SERPL-SCNC: 102 MMOL/L (ref 99–110)
CO2 BLDV-SCNC: 24 MMOL/L
CO2 SERPL-SCNC: 18 MMOL/L (ref 21–32)
COHGB MFR BLDV: 0.9 %
CREAT SERPL-MCNC: 1.6 MG/DL (ref 0.6–1.2)
D DIMER: 1.36 UG/ML FEU (ref 0–0.6)
DEPRECATED RDW RBC AUTO: 14.9 % (ref 12.4–15.4)
EOSINOPHIL # BLD: 0.1 K/UL (ref 0–0.6)
EOSINOPHIL NFR BLD: 1.8 %
GFR SERPLBLD CREATININE-BSD FMLA CKD-EPI: 34 ML/MIN/{1.73_M2}
GLUCOSE BLD-MCNC: 153 MG/DL (ref 70–99)
GLUCOSE SERPL-MCNC: 273 MG/DL (ref 70–99)
HCO3 BLDV-SCNC: 23 MMOL/L (ref 23–29)
HCT VFR BLD AUTO: 39.5 % (ref 36–48)
HGB BLD-MCNC: 12.8 G/DL (ref 12–16)
LACTATE BLDV-SCNC: 2.7 MMOL/L (ref 0.4–1.9)
LACTATE BLDV-SCNC: 3.6 MMOL/L (ref 0.4–1.9)
LYMPHOCYTES # BLD: 0.8 K/UL (ref 1–5.1)
LYMPHOCYTES NFR BLD: 15.8 %
MCH RBC QN AUTO: 31.1 PG (ref 26–34)
MCHC RBC AUTO-ENTMCNC: 32.4 G/DL (ref 31–36)
MCV RBC AUTO: 96 FL (ref 80–100)
METHGB MFR BLDV: 0.8 %
MONOCYTES # BLD: 0.3 K/UL (ref 0–1.3)
MONOCYTES NFR BLD: 5.7 %
NEUTROPHILS # BLD: 3.7 K/UL (ref 1.7–7.7)
NEUTROPHILS NFR BLD: 76.1 %
NT-PROBNP SERPL-MCNC: 4428 PG/ML (ref 0–449)
O2 THERAPY: ABNORMAL
PCO2 BLDV: 47.9 MMHG (ref 40–50)
PERFORMED ON: ABNORMAL
PH BLDV: 7.29 [PH] (ref 7.35–7.45)
PLATELET # BLD AUTO: 180 K/UL (ref 135–450)
PMV BLD AUTO: 9.8 FL (ref 5–10.5)
PO2 BLDV: <30 MMHG
POTASSIUM SERPL-SCNC: 5.2 MMOL/L (ref 3.5–5.1)
RBC # BLD AUTO: 4.12 M/UL (ref 4–5.2)
SAO2 % BLDV: 20 %
SODIUM SERPL-SCNC: 138 MMOL/L (ref 136–145)
TROPONIN, HIGH SENSITIVITY: 63 NG/L (ref 0–14)
WBC # BLD AUTO: 4.9 K/UL (ref 4–11)

## 2023-12-08 PROCEDURE — 83605 ASSAY OF LACTIC ACID: CPT

## 2023-12-08 PROCEDURE — 85025 COMPLETE CBC W/AUTO DIFF WBC: CPT

## 2023-12-08 PROCEDURE — 80048 BASIC METABOLIC PNL TOTAL CA: CPT

## 2023-12-08 PROCEDURE — 36415 COLL VENOUS BLD VENIPUNCTURE: CPT

## 2023-12-08 PROCEDURE — 2000000000 HC ICU R&B

## 2023-12-08 PROCEDURE — 71045 X-RAY EXAM CHEST 1 VIEW: CPT

## 2023-12-08 PROCEDURE — 87040 BLOOD CULTURE FOR BACTERIA: CPT

## 2023-12-08 PROCEDURE — 6370000000 HC RX 637 (ALT 250 FOR IP): Performed by: INTERNAL MEDICINE

## 2023-12-08 PROCEDURE — 93005 ELECTROCARDIOGRAM TRACING: CPT | Performed by: EMERGENCY MEDICINE

## 2023-12-08 PROCEDURE — 84484 ASSAY OF TROPONIN QUANT: CPT

## 2023-12-08 PROCEDURE — 99285 EMERGENCY DEPT VISIT HI MDM: CPT

## 2023-12-08 PROCEDURE — 2580000003 HC RX 258: Performed by: INTERNAL MEDICINE

## 2023-12-08 PROCEDURE — 85379 FIBRIN DEGRADATION QUANT: CPT

## 2023-12-08 PROCEDURE — 82803 BLOOD GASES ANY COMBINATION: CPT

## 2023-12-08 PROCEDURE — 83880 ASSAY OF NATRIURETIC PEPTIDE: CPT

## 2023-12-08 RX ORDER — SODIUM CHLORIDE 0.9 % (FLUSH) 0.9 %
5-40 SYRINGE (ML) INJECTION EVERY 12 HOURS SCHEDULED
Status: DISCONTINUED | OUTPATIENT
Start: 2023-12-08 | End: 2023-12-11 | Stop reason: HOSPADM

## 2023-12-08 RX ORDER — ATORVASTATIN CALCIUM 10 MG/1
10 TABLET, FILM COATED ORAL DAILY
Status: DISCONTINUED | OUTPATIENT
Start: 2023-12-09 | End: 2023-12-11 | Stop reason: HOSPADM

## 2023-12-08 RX ORDER — ONDANSETRON 4 MG/1
4 TABLET, ORALLY DISINTEGRATING ORAL EVERY 8 HOURS PRN
Status: DISCONTINUED | OUTPATIENT
Start: 2023-12-08 | End: 2023-12-11 | Stop reason: HOSPADM

## 2023-12-08 RX ORDER — ATROPINE SULFATE 0.1 MG/ML
2.5 INJECTION INTRAVENOUS PRN
Status: DISCONTINUED | OUTPATIENT
Start: 2023-12-08 | End: 2023-12-08 | Stop reason: DRUGHIGH

## 2023-12-08 RX ORDER — SODIUM CHLORIDE 9 MG/ML
INJECTION, SOLUTION INTRAVENOUS PRN
Status: DISCONTINUED | OUTPATIENT
Start: 2023-12-08 | End: 2023-12-11 | Stop reason: HOSPADM

## 2023-12-08 RX ORDER — SODIUM CHLORIDE 9 MG/ML
INJECTION, SOLUTION INTRAVENOUS CONTINUOUS
Status: DISCONTINUED | OUTPATIENT
Start: 2023-12-08 | End: 2023-12-08

## 2023-12-08 RX ORDER — ACETAMINOPHEN 325 MG/1
650 TABLET ORAL EVERY 6 HOURS PRN
Status: DISCONTINUED | OUTPATIENT
Start: 2023-12-08 | End: 2023-12-11 | Stop reason: HOSPADM

## 2023-12-08 RX ORDER — INSULIN LISPRO 100 [IU]/ML
0-4 INJECTION, SOLUTION INTRAVENOUS; SUBCUTANEOUS NIGHTLY
Status: DISCONTINUED | OUTPATIENT
Start: 2023-12-08 | End: 2023-12-11 | Stop reason: HOSPADM

## 2023-12-08 RX ORDER — ONDANSETRON 2 MG/ML
4 INJECTION INTRAMUSCULAR; INTRAVENOUS EVERY 6 HOURS PRN
Status: DISCONTINUED | OUTPATIENT
Start: 2023-12-08 | End: 2023-12-11 | Stop reason: HOSPADM

## 2023-12-08 RX ORDER — MAGNESIUM SULFATE IN WATER 40 MG/ML
2000 INJECTION, SOLUTION INTRAVENOUS PRN
Status: DISCONTINUED | OUTPATIENT
Start: 2023-12-08 | End: 2023-12-11 | Stop reason: HOSPADM

## 2023-12-08 RX ORDER — ACETAMINOPHEN 650 MG/1
650 SUPPOSITORY RECTAL EVERY 6 HOURS PRN
Status: DISCONTINUED | OUTPATIENT
Start: 2023-12-08 | End: 2023-12-11 | Stop reason: HOSPADM

## 2023-12-08 RX ORDER — HYDROCODONE BITARTRATE AND ACETAMINOPHEN 5; 325 MG/1; MG/1
1 TABLET ORAL EVERY 8 HOURS PRN
Status: DISCONTINUED | OUTPATIENT
Start: 2023-12-08 | End: 2023-12-11 | Stop reason: HOSPADM

## 2023-12-08 RX ORDER — POTASSIUM CHLORIDE 7.45 MG/ML
10 INJECTION INTRAVENOUS PRN
Status: DISCONTINUED | OUTPATIENT
Start: 2023-12-08 | End: 2023-12-11 | Stop reason: HOSPADM

## 2023-12-08 RX ORDER — ALPRAZOLAM 0.25 MG/1
0.25 TABLET ORAL 3 TIMES DAILY PRN
Status: DISCONTINUED | OUTPATIENT
Start: 2023-12-08 | End: 2023-12-11 | Stop reason: HOSPADM

## 2023-12-08 RX ORDER — GLIPIZIDE 5 MG/1
2.5 TABLET ORAL
Status: DISCONTINUED | OUTPATIENT
Start: 2023-12-09 | End: 2023-12-11 | Stop reason: HOSPADM

## 2023-12-08 RX ORDER — POTASSIUM CHLORIDE 29.8 MG/ML
20 INJECTION INTRAVENOUS PRN
Status: DISCONTINUED | OUTPATIENT
Start: 2023-12-08 | End: 2023-12-11 | Stop reason: HOSPADM

## 2023-12-08 RX ORDER — SODIUM CHLORIDE 0.9 % (FLUSH) 0.9 %
5-40 SYRINGE (ML) INJECTION PRN
Status: DISCONTINUED | OUTPATIENT
Start: 2023-12-08 | End: 2023-12-11 | Stop reason: HOSPADM

## 2023-12-08 RX ORDER — INSULIN LISPRO 100 [IU]/ML
0-8 INJECTION, SOLUTION INTRAVENOUS; SUBCUTANEOUS
Status: DISCONTINUED | OUTPATIENT
Start: 2023-12-09 | End: 2023-12-11 | Stop reason: HOSPADM

## 2023-12-08 RX ORDER — FAMOTIDINE 20 MG/1
20 TABLET, FILM COATED ORAL 2 TIMES DAILY
Status: DISCONTINUED | OUTPATIENT
Start: 2023-12-08 | End: 2023-12-08

## 2023-12-08 RX ORDER — ATROPINE SULFATE 0.1 MG/ML
0.5 INJECTION INTRAVENOUS EVERY 5 MIN PRN
Status: COMPLETED | OUTPATIENT
Start: 2023-12-08 | End: 2023-12-09

## 2023-12-08 RX ORDER — POLYETHYLENE GLYCOL 3350 17 G/17G
17 POWDER, FOR SOLUTION ORAL DAILY PRN
Status: DISCONTINUED | OUTPATIENT
Start: 2023-12-08 | End: 2023-12-11 | Stop reason: HOSPADM

## 2023-12-08 RX ADMIN — APIXABAN 5 MG: 5 TABLET, FILM COATED ORAL at 21:57

## 2023-12-08 RX ADMIN — Medication 10 ML: at 21:47

## 2023-12-08 ASSESSMENT — ENCOUNTER SYMPTOMS
ABDOMINAL PAIN: 0
EYE REDNESS: 0
COUGH: 0
SHORTNESS OF BREATH: 1
SORE THROAT: 0
RHINORRHEA: 0

## 2023-12-08 ASSESSMENT — LIFESTYLE VARIABLES: HOW OFTEN DO YOU HAVE A DRINK CONTAINING ALCOHOL: NEVER

## 2023-12-08 ASSESSMENT — PAIN - FUNCTIONAL ASSESSMENT: PAIN_FUNCTIONAL_ASSESSMENT: NONE - DENIES PAIN

## 2023-12-08 ASSESSMENT — PAIN SCALES - GENERAL: PAINLEVEL_OUTOF10: 0

## 2023-12-08 NOTE — ED PROVIDER NOTES
[address] [facility] [address] [address]            Pt Name: Luis Carlos Funes   MRN: [id number]   [address] 1950   Date of evaluation: 12/8/2023   Provider: Bell Ramey MD   PCP: Frankey Goodie, MD   Note Started: 5:06 PM EST 12/8/23          CHIEF COMPLAINT     Chief Complaint   Patient presents with    Shortness of Breath     Patient had [place] a few weeks ago, has been having shortness of breath with ambulation this week. Has \"water pills\" ordered but does not take the. HR variable on monitor 30s-90s,  44 radially, denies HX A Fib, verbalizes \"arrhythmia,\" BLE +3 edema. HISTORY OF PRESENT ILLNESS:   History from : Patient   Limitations to history : None     Luis Carlos Funes is a 75 y.o. female who presents complaining of dyspnea with exertion. This patient is a 78-year-old female has a past medical history of anxiety, arthritis, chronic back pain, diabetes, hypertension, obesity. She had COVID about a month ago. She states she felt well after the COVID. A few weeks later she started getting dyspnea with exertion. She states she can stand and as long as she is not moving she feels okay. She states if she does move however she can only walk 3-5 steps before she is markedly dyspneic. She is prescribed water pills which she states she has not been taking for the last several months. She reports associated lower extremity edema. Nursing Notes were all reviewed and agreed with, or any disagreements were addressed in the HPI. REVIEW OF SYSTEMS :    Review of Systems   Constitutional:  Negative for chills and fever. HENT:  Negative for rhinorrhea and sore throat. Eyes:  Negative for redness. Respiratory:  Positive for shortness of breath. Negative for cough. Cardiovascular:  Negative for chest pain. Gastrointestinal:  Negative for abdominal pain.    Genitourinary:  Negative for dysuria, flank pain and

## 2023-12-08 NOTE — ED TRIAGE NOTES
Patient had [place] [facility] a few weeks ago, has been having shortness of breath with ambulation this week. Has \"water pills\" ordered but does not take the. HR variable on monitor 30s-90s,  44 radially, denies HX A Fib, verbalizes \"arrhythmia,\" BLE +3 edema.

## 2023-12-09 PROBLEM — I10 PRIMARY HYPERTENSION: Status: ACTIVE | Noted: 2023-12-09

## 2023-12-09 PROBLEM — R79.89 TROPONIN LEVEL ELEVATED: Status: ACTIVE | Noted: 2023-12-09

## 2023-12-09 LAB
ALBUMIN SERPL-MCNC: 3.8 G/DL (ref 3.4–5)
ALBUMIN/GLOB SERPL: 1.7 {RATIO} (ref 1.1–2.2)
ALP SERPL-CCNC: 105 U/L (ref 40–129)
ALT SERPL-CCNC: 46 U/L (ref 10–40)
ANION GAP SERPL CALCULATED.3IONS-SCNC: 11 MMOL/L (ref 3–16)
AST SERPL-CCNC: 31 U/L (ref 15–37)
BASOPHILS # BLD: 0 K/UL (ref 0–0.2)
BASOPHILS NFR BLD: 0.3 %
BILIRUB SERPL-MCNC: 0.5 MG/DL (ref 0–1)
BUN SERPL-MCNC: 30 MG/DL (ref 7–20)
CALCIUM SERPL-MCNC: 9.3 MG/DL (ref 8.3–10.6)
CHLORIDE SERPL-SCNC: 108 MMOL/L (ref 99–110)
CO2 SERPL-SCNC: 23 MMOL/L (ref 21–32)
CREAT SERPL-MCNC: 1.2 MG/DL (ref 0.6–1.2)
DEPRECATED RDW RBC AUTO: 14.7 % (ref 12.4–15.4)
EKG ATRIAL RATE: 26 BPM
EKG DIAGNOSIS: NORMAL
EKG Q-T INTERVAL: 494 MS
EKG QRS DURATION: 110 MS
EKG QTC CALCULATION (BAZETT): 402 MS
EKG R AXIS: -55 DEGREES
EKG T AXIS: 3 DEGREES
EKG VENTRICULAR RATE: 40 BPM
EOSINOPHIL # BLD: 0.1 K/UL (ref 0–0.6)
EOSINOPHIL NFR BLD: 1.1 %
GFR SERPLBLD CREATININE-BSD FMLA CKD-EPI: 47 ML/MIN/{1.73_M2}
GLUCOSE BLD-MCNC: 153 MG/DL (ref 70–99)
GLUCOSE BLD-MCNC: 158 MG/DL (ref 70–99)
GLUCOSE BLD-MCNC: 170 MG/DL (ref 70–99)
GLUCOSE BLD-MCNC: 185 MG/DL (ref 70–99)
GLUCOSE SERPL-MCNC: 138 MG/DL (ref 70–99)
HCT VFR BLD AUTO: 40.5 % (ref 36–48)
HGB BLD-MCNC: 13.4 G/DL (ref 12–16)
LYMPHOCYTES # BLD: 0.9 K/UL (ref 1–5.1)
LYMPHOCYTES NFR BLD: 15.6 %
MCH RBC QN AUTO: 31.6 PG (ref 26–34)
MCHC RBC AUTO-ENTMCNC: 33 G/DL (ref 31–36)
MCV RBC AUTO: 95.8 FL (ref 80–100)
MONOCYTES # BLD: 0.4 K/UL (ref 0–1.3)
MONOCYTES NFR BLD: 7.8 %
NEUTROPHILS # BLD: 4.1 K/UL (ref 1.7–7.7)
NEUTROPHILS NFR BLD: 75.2 %
PERFORMED ON: ABNORMAL
PLATELET # BLD AUTO: 152 K/UL (ref 135–450)
PMV BLD AUTO: 9.3 FL (ref 5–10.5)
POTASSIUM SERPL-SCNC: 4.6 MMOL/L (ref 3.5–5.1)
PROT SERPL-MCNC: 6 G/DL (ref 6.4–8.2)
RBC # BLD AUTO: 4.23 M/UL (ref 4–5.2)
SODIUM SERPL-SCNC: 142 MMOL/L (ref 136–145)
WBC # BLD AUTO: 5.5 K/UL (ref 4–11)

## 2023-12-09 PROCEDURE — 2580000003 HC RX 258: Performed by: INTERNAL MEDICINE

## 2023-12-09 PROCEDURE — 6360000002 HC RX W HCPCS: Performed by: INTERNAL MEDICINE

## 2023-12-09 PROCEDURE — 85025 COMPLETE CBC W/AUTO DIFF WBC: CPT

## 2023-12-09 PROCEDURE — 80053 COMPREHEN METABOLIC PANEL: CPT

## 2023-12-09 PROCEDURE — 6360000002 HC RX W HCPCS: Performed by: NURSE PRACTITIONER

## 2023-12-09 PROCEDURE — 2000000000 HC ICU R&B

## 2023-12-09 PROCEDURE — 6370000000 HC RX 637 (ALT 250 FOR IP): Performed by: NURSE PRACTITIONER

## 2023-12-09 PROCEDURE — 99223 1ST HOSP IP/OBS HIGH 75: CPT | Performed by: INTERNAL MEDICINE

## 2023-12-09 PROCEDURE — 36415 COLL VENOUS BLD VENIPUNCTURE: CPT

## 2023-12-09 PROCEDURE — 6370000000 HC RX 637 (ALT 250 FOR IP): Performed by: INTERNAL MEDICINE

## 2023-12-09 PROCEDURE — 93010 ELECTROCARDIOGRAM REPORT: CPT | Performed by: INTERNAL MEDICINE

## 2023-12-09 PROCEDURE — 94760 N-INVAS EAR/PLS OXIMETRY 1: CPT

## 2023-12-09 RX ORDER — HYDRALAZINE HYDROCHLORIDE 20 MG/ML
5 INJECTION INTRAMUSCULAR; INTRAVENOUS ONCE
Status: COMPLETED | OUTPATIENT
Start: 2023-12-09 | End: 2023-12-09

## 2023-12-09 RX ORDER — HYDRALAZINE HYDROCHLORIDE 20 MG/ML
10 INJECTION INTRAMUSCULAR; INTRAVENOUS EVERY 6 HOURS PRN
Status: DISCONTINUED | OUTPATIENT
Start: 2023-12-09 | End: 2023-12-11 | Stop reason: HOSPADM

## 2023-12-09 RX ORDER — GUAIFENESIN/DEXTROMETHORPHAN 100-10MG/5
10 SYRUP ORAL EVERY 6 HOURS PRN
Status: DISCONTINUED | OUTPATIENT
Start: 2023-12-09 | End: 2023-12-11 | Stop reason: HOSPADM

## 2023-12-09 RX ORDER — AMLODIPINE BESYLATE 10 MG/1
10 TABLET ORAL DAILY
Status: DISCONTINUED | OUTPATIENT
Start: 2023-12-09 | End: 2023-12-11

## 2023-12-09 RX ORDER — LORAZEPAM 2 MG/ML
0.5 INJECTION INTRAMUSCULAR ONCE
Status: COMPLETED | OUTPATIENT
Start: 2023-12-09 | End: 2023-12-09

## 2023-12-09 RX ADMIN — ATROPINE SULFATE 0.5 MG: 0.1 INJECTION INTRAVENOUS at 01:05

## 2023-12-09 RX ADMIN — HYDRALAZINE HYDROCHLORIDE 10 MG: 20 INJECTION, SOLUTION INTRAMUSCULAR; INTRAVENOUS at 23:57

## 2023-12-09 RX ADMIN — GUAIFENESIN SYRUP AND DEXTROMETHORPHAN 10 ML: 100; 10 SYRUP ORAL at 23:53

## 2023-12-09 RX ADMIN — AMLODIPINE BESYLATE 10 MG: 10 TABLET ORAL at 09:36

## 2023-12-09 RX ADMIN — ATORVASTATIN CALCIUM 10 MG: 10 TABLET, FILM COATED ORAL at 09:36

## 2023-12-09 RX ADMIN — HYDRALAZINE HYDROCHLORIDE 5 MG: 20 INJECTION, SOLUTION INTRAMUSCULAR; INTRAVENOUS at 03:04

## 2023-12-09 RX ADMIN — Medication 0.5 MG: at 04:39

## 2023-12-09 RX ADMIN — ATROPINE SULFATE 0.5 MG: 0.1 INJECTION INTRAVENOUS at 02:08

## 2023-12-09 RX ADMIN — ATROPINE SULFATE 0.5 MG: 0.1 INJECTION INTRAVENOUS at 01:15

## 2023-12-09 RX ADMIN — Medication 10 ML: at 21:03

## 2023-12-09 RX ADMIN — Medication 10 ML: at 09:36

## 2023-12-09 RX ADMIN — HYDRALAZINE HYDROCHLORIDE 5 MG: 20 INJECTION, SOLUTION INTRAMUSCULAR; INTRAVENOUS at 05:17

## 2023-12-09 RX ADMIN — APIXABAN 5 MG: 5 TABLET, FILM COATED ORAL at 21:03

## 2023-12-09 NOTE — H&P
.   V2.0  History and Physical      Name:  Preet Fabian /Age/Sex: 1950  (70 y.o. female)   MRN & CSN:  [id number] & [id number] Encounter Date/Time: 2023 8:15 PM EST   Location:  [address] [place] [address] PCP: Mal Lee MD       Hospital Day: 1    Assessment and Plan:   Preet Fabian is a 75 y.o. female with a pmh of hypertension, A-fib, diabetes, hyperlipidemia, osteoarthritis, psoriasis, anxiety, who presents with Bradycardia    Hospital Problems             Last Modified POA    * (Principal) Bradycardia 2023 Yes   Chronotropic incompetence  Atrial fibrillation with slow ventricular response  Acute kidney injury  Elevated troponins  Diabetes  Hypertension  Hyperlipidemia  Osteoarthritis  Psoriasis  Anxiety    Plan:  Admit to the ICU. Patient is hemodynamically stable blood pressure has been stable. No need for reversal.  Will hold beta-blockers  Consult cardiology  Repeat EKG and trend troponins  Resume home medications which would include Eliquis atorvastatin hydrocodone aspirin alprazolam we will hold metformin lisinopril and metoprolol at this time. Will use sliding scale insulin for better glycemic control  Will also start NS at 50 cc an hour x 1 L  Initiate carb controlled low-sodium diet  Echocardiogram    Disposition:   Current Living situation: Lives in the community by herself and her daughter lives next door  Expected Disposition: Home  Estimated D/C: 2 days    Diet ADULT DIET; Regular; 3 carb choices (45 gm/meal);  Low Fat/Low Chol/High Fiber/MYRTLE; Low Sodium (2 gm)   DVT Prophylaxis [] Lovenox, []  Heparin, [] SCDs, [] Ambulation,  [x] Eliquis, [] Xarelto, [] Coumadin   Code Status Full Code   Surrogate Decision Maker/ POA Daughter Salas Swain     Personally reviewed Lab Studies and Imaging     Discussed management of the case with patient and addressed any concerns she had w        History from:     patient    History of Present Illness:     Chief Complaint: Dyspnea on

## 2023-12-09 NOTE — PROGRESS NOTES
Medication Reconciliation    List of medications patient is currently taking is complete. Source of information: 1. Conversation with patient at bedside                                      2. EPIC records      Allergies  Bactrim [sulfamethoxazole-trimethoprim] and Penicillins     Notes regarding home medications:   1. Patient received all of her morning home medications prior to arrival to the emergency department. 2. Patient is on Eliquis 5 mg, and persistently claims to be taking it once daily rather than BID as directed by the script. 3. Patient claims to not be taking furosemide 40 mg, as also indicted by an emergency room visit on 11/20/23. Though it states to hold for one week, patient has still not continued the medication.     Lottie Rm, Pharmacy Intern  12/8/2023 8:54 PM

## 2023-12-09 NOTE — PROGRESS NOTES
This RN spoke with cardiologist on call about blood pressure elevation and bradycardia with abnormal rhythms. Per MD give . 5 of atropine if HR sustained lower than 30 bpm, keep npo and monitor for change in mentation. Will continue to assess.

## 2023-12-09 NOTE — PLAN OF CARE
Patient was admitted to ICU , in room 2104. Vital signs taken . Care team notified of arrival.    All labs and orders reviewed. Will continue to assess.        Problem: Discharge Planning  Goal: Discharge to home or other facility with appropriate resources  Outcome: Progressing     Problem: Safety - Adult  Goal: Free from fall injury  Outcome: Progressing     Problem: ABCDS Injury Assessment  Goal: Absence of physical injury  Outcome: Progressing

## 2023-12-09 NOTE — PROGRESS NOTES
This Rn spoke with cardiology  updated on patient. Jeremie Better for a diet, hold eliquis until he sees patient. Spoke with Dr. Catarina Rosas updated as well no new orders.

## 2023-12-09 NOTE — PROGRESS NOTES
4 Eyes Skin Assessment     NAME:  Maria Isabel Becker  YOB: 1950  MEDICAL RECORD NUMBER:  [id number]    The patient is being assessed for  Admission    I agree that at least one RN has performed a thorough Head to Toe Skin Assessment on the patient. ALL assessment sites listed below have been assessed. Areas assessed by both nurses:    Head, Face, Ears, Shoulders, Back, Chest, Arms, Elbows, Hands, Sacrum. Buttock, Coccyx, Ischium, Legs. Feet and Heels, Under Medical Devices , and Other redness found on Lower legs, lower mid back and coccyx. Does the Patient have a Wound? Yes wound(s) were present on assessment.  LDA wound assessment was Initiated and completed by RN       Renaldo Prevention initiated by RN: Yes  Wound Care Orders initiated by RN: Yes    Pressure Injury (Stage 3,4, Unstageable, DTI, NWPT, and Complex wounds) if present, place Wound referral order by RN under : No    New Ostomies, if present place, Ostomy referral order under : No     Nurse 1 eSignature: Electronically signed by Gonzales Villalobos RN on 12/8/23 at 9:38 PM EST    **SHARE this note so that the co-signing nurse can place an eSignature**    Nurse 2 eSignature: Electronically signed by Karla Medrano RN on 12/8/23 at 9:44 PM EST

## 2023-12-09 NOTE — CONSULTS
Referring Physician: * No referring provider recorded for this case *  Reason for Consultation: Atrial fibrillation with slow ventricular rate  Chief Complaint: I feel tired      Subjective:   History of Present Illness:  Fay Metcalf is a 75 y.o. patient with prior history of chronic atrial fibrillation hypertension diabetes who presented to the hospital with complaints of feeling intermittently weak for last 7 to 10 days. She has been recovering from [facility] which she had approximately a month ago. She says she feels suddenly weak and those feelings will last for a short bit of time and go away. She denied any passing out spell she did complain of some chest pain but no shortness of breath. Since her symptoms persisted she came to the emergency room. In the emergency room she was found to have atrial fibrillation with very slow ventricular rate. She has been admitted for further care. She had intermittent chest pain but denied any shortness of breath orthopnea PND no leg edema. I have been asked to provide consultation regarding further management and testing. Review of Systems:   All 12 point review of symptoms completed. Pertinent positives identified in the HPI, all other review of symptoms negative as below. 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 Surgical History:   has a past surgical history that includes  section and Leg Surgery (Right, ). Social History:   reports that she has never smoked. She has never used smokeless tobacco. She reports that she does not drink alcohol and does not use drugs. Family History:  family history includes High Blood Pressure in her father and mother.       Home Medications:  Were reviewed and are listed in nursing record and/or below  Prior to Admission medications    Medication Sig Start PRN  potassium chloride 20 mEq/50 mL IVPB (Central Line), PRN   Or  potassium chloride 10 mEq/100 mL IVPB (Peripheral Line), PRN  magnesium sulfate 2000 mg in 50 mL IVPB premix, PRN  ondansetron (ZOFRAN-ODT) disintegrating tablet 4 mg, Q8H PRN   Or  ondansetron (ZOFRAN) injection 4 mg, Q6H PRN  polyethylene glycol (GLYCOLAX) packet 17 g, Daily PRN  acetaminophen (TYLENOL) tablet 650 mg, Q6H PRN   Or  acetaminophen (TYLENOL) suppository 650 mg, Q6H PRN  ALPRAZolam (XANAX) tablet 0.25 mg, TID PRN  apixaban (ELIQUIS) tablet 5 mg, BID  atorvastatin (LIPITOR) tablet 10 mg, Daily  glipiZIDE (GLUCOTROL) tablet 2.5 mg, QAM AC  HYDROcodone-acetaminophen (NORCO) 5-325 MG per tablet 1 tablet, Q8H PRN  insulin lispro (HUMALOG) injection vial 0-8 Units, TID WC  insulin lispro (HUMALOG) injection vial 0-4 Units, Nightly        Allergies:  Bactrim [sulfamethoxazole-trimethoprim] and Penicillins         Objective:   PHYSICAL EXAM:    Vitals:    12/09/23 0900   BP: (!) 171/68   Pulse: 55   Resp: 22   Temp:    SpO2: 95%    Weight - Scale: 110.8 kg (244 lb 4.3 oz)         General Appearance:  Alert, cooperative, no distress, appears stated age. Head:  Normocephalic, without obvious abnormality, atraumatic. Eyes:  Pupils equal and round. No scleral icterus. Mouth: Moist mucosa, no pharyngeal erythema. Nose: Nares normal. No drainage or sinus tenderness. Neck: Supple, symmetrical, trachea midline. No adenopathy. No tenderness/mass/nodules. No carotid bruit or JVD       Lungs:   Clear to auscultation bilaterally, respirations unlabored. No wheeze, rales, or rhonchi. Chest Wall:  No tenderness or deformity. Heart:  Irregularly irregular, S1/ S2 normal. No murmur, rub, or gallop. Abdomen:   Soft, non-tender, bowel sounds active. Musculoskeletal: No muscle wasting or digital clubbing. Extremities: Extremities normal, atraumatic. No cyanosis or edema. Pulses: 2+ radial and pedal pulses, symmetric.    Skin: No

## 2023-12-10 LAB
ANION GAP SERPL CALCULATED.3IONS-SCNC: 10 MMOL/L (ref 3–16)
BUN SERPL-MCNC: 29 MG/DL (ref 7–20)
CALCIUM SERPL-MCNC: 8.9 MG/DL (ref 8.3–10.6)
CHLORIDE SERPL-SCNC: 104 MMOL/L (ref 99–110)
CO2 SERPL-SCNC: 24 MMOL/L (ref 21–32)
CREAT SERPL-MCNC: 1.1 MG/DL (ref 0.6–1.2)
GFR SERPLBLD CREATININE-BSD FMLA CKD-EPI: 53 ML/MIN/{1.73_M2}
GLUCOSE BLD-MCNC: 149 MG/DL (ref 70–99)
GLUCOSE BLD-MCNC: 178 MG/DL (ref 70–99)
GLUCOSE BLD-MCNC: 226 MG/DL (ref 70–99)
GLUCOSE SERPL-MCNC: 169 MG/DL (ref 70–99)
MAGNESIUM SERPL-MCNC: 1.9 MG/DL (ref 1.8–2.4)
PERFORMED ON: ABNORMAL
POTASSIUM SERPL-SCNC: 3.9 MMOL/L (ref 3.5–5.1)
SODIUM SERPL-SCNC: 138 MMOL/L (ref 136–145)
TROPONIN, HIGH SENSITIVITY: 48 NG/L (ref 0–14)

## 2023-12-10 PROCEDURE — 99233 SBSQ HOSP IP/OBS HIGH 50: CPT | Performed by: INTERNAL MEDICINE

## 2023-12-10 PROCEDURE — 6360000002 HC RX W HCPCS: Performed by: INTERNAL MEDICINE

## 2023-12-10 PROCEDURE — 2060000000 HC ICU INTERMEDIATE R&B

## 2023-12-10 PROCEDURE — 6370000000 HC RX 637 (ALT 250 FOR IP): Performed by: NURSE PRACTITIONER

## 2023-12-10 PROCEDURE — 6370000000 HC RX 637 (ALT 250 FOR IP): Performed by: INTERNAL MEDICINE

## 2023-12-10 PROCEDURE — 2580000003 HC RX 258: Performed by: INTERNAL MEDICINE

## 2023-12-10 PROCEDURE — 83735 ASSAY OF MAGNESIUM: CPT

## 2023-12-10 PROCEDURE — 36415 COLL VENOUS BLD VENIPUNCTURE: CPT

## 2023-12-10 PROCEDURE — 84484 ASSAY OF TROPONIN QUANT: CPT

## 2023-12-10 PROCEDURE — 80048 BASIC METABOLIC PNL TOTAL CA: CPT

## 2023-12-10 RX ADMIN — GLIPIZIDE 2.5 MG: 5 TABLET ORAL at 09:46

## 2023-12-10 RX ADMIN — APIXABAN 5 MG: 5 TABLET, FILM COATED ORAL at 20:30

## 2023-12-10 RX ADMIN — GUAIFENESIN SYRUP AND DEXTROMETHORPHAN 10 ML: 100; 10 SYRUP ORAL at 12:53

## 2023-12-10 RX ADMIN — HYDRALAZINE HYDROCHLORIDE 10 MG: 20 INJECTION, SOLUTION INTRAMUSCULAR; INTRAVENOUS at 23:47

## 2023-12-10 RX ADMIN — HYDRALAZINE HYDROCHLORIDE 10 MG: 20 INJECTION, SOLUTION INTRAMUSCULAR; INTRAVENOUS at 06:15

## 2023-12-10 RX ADMIN — Medication 10 ML: at 20:30

## 2023-12-10 RX ADMIN — ATORVASTATIN CALCIUM 10 MG: 10 TABLET, FILM COATED ORAL at 09:46

## 2023-12-10 RX ADMIN — APIXABAN 5 MG: 5 TABLET, FILM COATED ORAL at 09:46

## 2023-12-10 RX ADMIN — Medication 10 ML: at 09:46

## 2023-12-10 RX ADMIN — AMLODIPINE BESYLATE 10 MG: 10 TABLET ORAL at 09:46

## 2023-12-10 RX ADMIN — GUAIFENESIN SYRUP AND DEXTROMETHORPHAN 10 ML: 100; 10 SYRUP ORAL at 20:30

## 2023-12-10 RX ADMIN — INSULIN LISPRO 2 UNITS: 100 INJECTION, SOLUTION INTRAVENOUS; SUBCUTANEOUS at 13:27

## 2023-12-10 ASSESSMENT — PAIN SCALES - GENERAL
PAINLEVEL_OUTOF10: 0

## 2023-12-10 NOTE — PROGRESS NOTES
Pt walked to bathroom with standby assist. No SOB/CP. Voiding without difficulty. Ambulated to chair this morning for breakfast. VSS. No complaints or concerns at this time. Call light within reach. Will continue to monitor.

## 2023-12-10 NOTE — PROGRESS NOTES
PRN  potassium chloride, 20 mEq, PRN   Or  potassium chloride, 10 mEq, PRN  magnesium sulfate, 2,000 mg, PRN  ondansetron, 4 mg, Q8H PRN   Or  ondansetron, 4 mg, Q6H PRN  polyethylene glycol, 17 g, Daily PRN  acetaminophen, 650 mg, Q6H PRN   Or  acetaminophen, 650 mg, Q6H PRN  ALPRAZolam, 0.25 mg, TID PRN  HYDROcodone-acetaminophen, 1 tablet, Q8H PRN        Labs and Imaging   XR CHEST PORTABLE    Result Date: 12/8/2023  EXAMINATION: ONE XRAY VIEW OF THE CHEST 12/8/2023 5:12 pm COMPARISON: CXR dated 08/17/2021 HISTORY: ORDERING SYSTEM PROVIDED HISTORY: Dyspnea TECHNOLOGIST PROVIDED HISTORY: Reason for exam:->Dyspnea FINDINGS: Medical devices: None. Mediastinum/Heart: The mediastinal contours are normal. The heart appears normal in size. Lungs: Patchy opacities in the retrocardiac left lung and left lung base, atelectasis versus pneumonia. Pleura: Trace left pleural effusion. No pneumothorax. 1. Patchy opacities in the retrocardiac left lung and left lung base, atelectasis versus pneumonia. 2. Trace left pleural effusion. CBC:   Recent Labs     12/08/23  1724 12/09/23  1053   WBC 4.9 5.5   HGB 12.8 13.4    152     BMP:    Recent Labs     12/08/23  1851 12/09/23  1053 12/10/23  0511    142 138   K 5.2* 4.6 3.9    108 104   CO2 18* 23 24   BUN 37* 30* 29*   CREATININE 1.6* 1.2 1.1   GLUCOSE 273* 138* 169*     Hepatic:   Recent Labs     12/09/23  1053   AST 31   ALT 46*   BILITOT 0.5   ALKPHOS 105     Lipids:   Lab Results   Component Value Date/Time    CHOL 143 07/24/2023 02:38 PM    HDL 58 07/24/2023 02:38 PM    TRIG 137 07/24/2023 02:38 PM     Hemoglobin A1C:   Lab Results   Component Value Date/Time    LABA1C 8.9 07/24/2023 02:38 PM     TSH:   Lab Results   Component Value Date/Time    TSH 3.030 07/24/2023 02:38 PM     Troponin: No results found for: \"TROPONINT\"  Lactic Acid: No results for input(s): \"LACTA\" in the last 72 hours.   BNP:   Recent Labs     12/08/23  1851   PROBNP 4,428*

## 2023-12-11 ENCOUNTER — TELEPHONE (OUTPATIENT)
Dept: CARDIOLOGY CLINIC | Age: 73
End: 2023-12-11

## 2023-12-11 VITALS
OXYGEN SATURATION: 98 % | RESPIRATION RATE: 20 BRPM | BODY MASS INDEX: 41.51 KG/M2 | DIASTOLIC BLOOD PRESSURE: 58 MMHG | TEMPERATURE: 98.1 F | HEART RATE: 68 BPM | SYSTOLIC BLOOD PRESSURE: 142 MMHG | HEIGHT: 65 IN | WEIGHT: 249.12 LBS

## 2023-12-11 DIAGNOSIS — R00.1 SYMPTOMATIC BRADYCARDIA: ICD-10-CM

## 2023-12-11 DIAGNOSIS — I48.91 ATRIAL FIBRILLATION, UNSPECIFIED TYPE (HCC): Primary | ICD-10-CM

## 2023-12-11 DIAGNOSIS — I50.22 CHRONIC SYSTOLIC (CONGESTIVE) HEART FAILURE (HCC): ICD-10-CM

## 2023-12-11 LAB
ANION GAP SERPL CALCULATED.3IONS-SCNC: 9 MMOL/L (ref 3–16)
BUN SERPL-MCNC: 40 MG/DL (ref 7–20)
CALCIUM SERPL-MCNC: 9.1 MG/DL (ref 8.3–10.6)
CHLORIDE SERPL-SCNC: 106 MMOL/L (ref 99–110)
CO2 SERPL-SCNC: 25 MMOL/L (ref 21–32)
CREAT SERPL-MCNC: 1.3 MG/DL (ref 0.6–1.2)
GFR SERPLBLD CREATININE-BSD FMLA CKD-EPI: 43 ML/MIN/{1.73_M2}
GLUCOSE BLD-MCNC: 154 MG/DL (ref 70–99)
GLUCOSE BLD-MCNC: 205 MG/DL (ref 70–99)
GLUCOSE SERPL-MCNC: 181 MG/DL (ref 70–99)
MAGNESIUM SERPL-MCNC: 2.1 MG/DL (ref 1.8–2.4)
PERFORMED ON: ABNORMAL
PERFORMED ON: ABNORMAL
POTASSIUM SERPL-SCNC: 4.6 MMOL/L (ref 3.5–5.1)
SODIUM SERPL-SCNC: 140 MMOL/L (ref 136–145)

## 2023-12-11 PROCEDURE — 6370000000 HC RX 637 (ALT 250 FOR IP): Performed by: INTERNAL MEDICINE

## 2023-12-11 PROCEDURE — 83735 ASSAY OF MAGNESIUM: CPT

## 2023-12-11 PROCEDURE — 36415 COLL VENOUS BLD VENIPUNCTURE: CPT

## 2023-12-11 PROCEDURE — 6370000000 HC RX 637 (ALT 250 FOR IP): Performed by: NURSE PRACTITIONER

## 2023-12-11 PROCEDURE — 97165 OT EVAL LOW COMPLEX 30 MIN: CPT

## 2023-12-11 PROCEDURE — 97116 GAIT TRAINING THERAPY: CPT | Performed by: PHYSICAL THERAPIST

## 2023-12-11 PROCEDURE — 97161 PT EVAL LOW COMPLEX 20 MIN: CPT | Performed by: PHYSICAL THERAPIST

## 2023-12-11 PROCEDURE — 99233 SBSQ HOSP IP/OBS HIGH 50: CPT | Performed by: INTERNAL MEDICINE

## 2023-12-11 PROCEDURE — 2580000003 HC RX 258: Performed by: INTERNAL MEDICINE

## 2023-12-11 PROCEDURE — 94760 N-INVAS EAR/PLS OXIMETRY 1: CPT

## 2023-12-11 PROCEDURE — 97530 THERAPEUTIC ACTIVITIES: CPT

## 2023-12-11 PROCEDURE — 80048 BASIC METABOLIC PNL TOTAL CA: CPT

## 2023-12-11 RX ORDER — FUROSEMIDE 40 MG/1
40 TABLET ORAL DAILY
Qty: 30 TABLET | Refills: 0 | Status: SHIPPED | OUTPATIENT
Start: 2023-12-12

## 2023-12-11 RX ORDER — FUROSEMIDE 40 MG/1
40 TABLET ORAL DAILY
Status: DISCONTINUED | OUTPATIENT
Start: 2023-12-11 | End: 2023-12-11 | Stop reason: HOSPADM

## 2023-12-11 RX ORDER — HYDRALAZINE HYDROCHLORIDE 25 MG/1
25 TABLET, FILM COATED ORAL EVERY 8 HOURS SCHEDULED
Qty: 90 TABLET | Refills: 0 | Status: SHIPPED | OUTPATIENT
Start: 2023-12-11

## 2023-12-11 RX ORDER — AMLODIPINE BESYLATE 10 MG/1
10 TABLET ORAL DAILY
Qty: 30 TABLET | Refills: 0 | Status: SHIPPED | OUTPATIENT
Start: 2023-12-12

## 2023-12-11 RX ORDER — HYDRALAZINE HYDROCHLORIDE 25 MG/1
25 TABLET, FILM COATED ORAL EVERY 8 HOURS SCHEDULED
Status: DISCONTINUED | OUTPATIENT
Start: 2023-12-11 | End: 2023-12-11 | Stop reason: HOSPADM

## 2023-12-11 RX ORDER — AMLODIPINE BESYLATE 10 MG/1
10 TABLET ORAL DAILY
Status: DISCONTINUED | OUTPATIENT
Start: 2023-12-12 | End: 2023-12-11 | Stop reason: HOSPADM

## 2023-12-11 RX ADMIN — HYDRALAZINE HYDROCHLORIDE 25 MG: 25 TABLET, FILM COATED ORAL at 15:34

## 2023-12-11 RX ADMIN — GUAIFENESIN SYRUP AND DEXTROMETHORPHAN 10 ML: 100; 10 SYRUP ORAL at 06:05

## 2023-12-11 RX ADMIN — GLIPIZIDE 2.5 MG: 5 TABLET ORAL at 06:05

## 2023-12-11 RX ADMIN — MICONAZOLE NITRATE: 20 CREAM TOPICAL at 11:56

## 2023-12-11 RX ADMIN — APIXABAN 5 MG: 5 TABLET, FILM COATED ORAL at 08:47

## 2023-12-11 RX ADMIN — ATORVASTATIN CALCIUM 10 MG: 10 TABLET, FILM COATED ORAL at 08:46

## 2023-12-11 RX ADMIN — Medication 10 ML: at 08:47

## 2023-12-11 RX ADMIN — FUROSEMIDE 40 MG: 40 TABLET ORAL at 10:23

## 2023-12-11 RX ADMIN — HYDROCODONE BITARTRATE AND ACETAMINOPHEN 1 TABLET: 5; 325 TABLET ORAL at 03:11

## 2023-12-11 RX ADMIN — INSULIN LISPRO 2 UNITS: 100 INJECTION, SOLUTION INTRAVENOUS; SUBCUTANEOUS at 12:57

## 2023-12-11 RX ADMIN — AMLODIPINE BESYLATE 10 MG: 10 TABLET ORAL at 08:46

## 2023-12-11 ASSESSMENT — PAIN DESCRIPTION - DESCRIPTORS: DESCRIPTORS: ACHING

## 2023-12-11 ASSESSMENT — PAIN - FUNCTIONAL ASSESSMENT: PAIN_FUNCTIONAL_ASSESSMENT: ACTIVITIES ARE NOT PREVENTED

## 2023-12-11 ASSESSMENT — PAIN DESCRIPTION - ORIENTATION: ORIENTATION: LOWER

## 2023-12-11 ASSESSMENT — PAIN SCALES - GENERAL: PAINLEVEL_OUTOF10: 5

## 2023-12-11 ASSESSMENT — PAIN DESCRIPTION - LOCATION: LOCATION: BACK

## 2023-12-11 NOTE — DISCHARGE INSTR - COC
Continuity of Care Form    Patient Name: Najma Lebron   :  1950  MRN:  [id number]    Admit date:  2023  Discharge date:  ***    Code Status Order: Full Code   Advance Directives:     Admitting Physician: Sabiha Simpson MD  PCP: Vilma Walker MD    Discharging Nurse: [facility] Unit/Room#: G7A-2865/5122-01  Discharging Unit Phone Number: ***    Emergency Contact:   Extended Emergency Contact Information  Primary Emergency Contact: MALCOLM HUERTA  Address: [facility]  Home Phone: [phone]  Mobile Phone: [phone]  Relation: Child   needed?  No  Secondary Emergency Contact: [address] Phone: [phone]  Relation: Child    Past Surgical History:  Past Surgical History:   Procedure Laterality Date     SECTION      LEG SURGERY Right 1993       Immunization History:   Immunization History   Administered Date(s) Administered    Influenza, High Dose (Fluzone 65 yrs and older) 10/23/2014    Pneumococcal, PCV-13, PREVNAR 15, (age 6w+), IM, 0.5mL 2017    Pneumococcal, PPSV23, PNEUMOVAX 21, (age 2y+), SC/IM, 0.5mL 10/23/2014    TDaP, ADACEL (age 6y-58y), Marlene Furbish (age 10y+), IM, 0.5mL 2016       Active Problems:  Patient Active Problem List   Diagnosis Code    Morbid obesity ([address]) E66.01    Psoriasis L40.9    High cholesterol E78.00    Diabetes 1.5, 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    Anxiety F41.9    Sedative, hypnotic or anxiolytic use, unspecified with unspecified sedative, hypnotic or anxiolytic-induced disorder F13.99    Sedative, hypnotic or anxiolytic dependence with unspecified sedative, hypnotic or anxiolytic-induced disorder F13.29    Sedative, hypnotic or anxiolytic dependence, uncomplicated G49.17    Acute respiratory failure (HCC) J96.00    Community acquired pneumonia of left lower lobe of lung J18.9    Acute respiratory failure with hypoxemia (HCC) J96.01    COVID-19 U07.1    Type 2 diabetes erythema; Warm 12/10/23 2030   Margins Defined edges 23   Number of days: 2       Wound 23 Pretibial Right (Active)   Wound Etiology Venous 12/10/23 2030   Wound Cleansed Not Cleansed 12/10/23 2030   Wound Assessment Dry 12/10/23 2030   Drainage Amount None (dry) 12/10/23 2030   Odor None 23   Number of days: 2       Wound 23 Pretibial Left (Active)   Wound Etiology Venous 12/10/23 2030   Wound Cleansed Wound cleanser 23   Wound Assessment Dry 12/10/23 2030   Drainage Amount None (dry) 12/10/23 2030   Odor None 12/10/23 2030   Anna-wound Assessment Intact 23   Margins Defined edges 23   Number of days: 2        Elimination:  Continence: Bowel: {YES / KN:54867}  Bladder: {YES / TY:27007}  Urinary Catheter: {Urinary Catheter:278468101}   Colostomy/Ileostomy/Ileal Conduit: {YES / DJ:21558}       Date of Last BM: ***    Intake/Output Summary (Last 24 hours) at 2023 1333  Last data filed at 2023 1257  Gross per 24 hour   Intake 340 ml   Output 600 ml   Net -260 ml     I/O last 3 completed shifts:   In: 80 [P.O.:580]  Out: 650 [Urine:650]    Safety Concerns:     [facility] [employer] Safety Concerns:404788691}    Impairments/Disabilities:      [address] [place] [address] [place] Impairments/Disabilities:297295199}    Nutrition Therapy:  Current Nutrition Therapy:   [facility] [employer] Diet List:840062933}    Routes of Feeding: {CHP DME Other Feedings:406399904}  Liquids: {Slp liquid thickness:26475}  Daily Fluid Restriction: {CHP DME Yes amt example:194138403}  Last Modified Barium Swallow with Video (Video Swallowing Test): {Done Not Done AZEX:402840171}    Treatments at the Time of Hospital Discharge:   Respiratory Treatments: ***  Oxygen Therapy:  {Therapy; copd oxygen:15973}  Ventilator:    { ARIADNA Vent IWOD:221047803}    Rehab Therapies: {THERAPEUTIC INTERVENTION:2997461506}  Weight Bearing Status/Restrictions: { CC Weight Bearin}  Other Medical Equipment (for information only,

## 2023-12-11 NOTE — PROGRESS NOTES
Occupational Therapy  Facility/Department: [address] PROGRESSIVE CARE  Occupational Therapy Initial Assessment/Discharge    Name: Audrey Bah  : 1950  MRN: [id number]  Date of Service: 2023    Discharge Recommendations:  Home with assist PRN     Audrey Bah scored a 21/24 on the AM-PAC ADL Inpatient form. At this time, no further OT is recommended upon discharge due to pt functioning near baseline and daughter lives next door and can assist prn. Recommend patient returns to prior setting with prior services. Patient Diagnosis(es): The primary encounter diagnosis was Symptomatic bradycardia. Diagnoses of Elevated brain natriuretic peptide (BNP) level, Renal insufficiency, Troponin level elevated, and Abnormal CXR were also pertinent to this visit. 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 Past Surgical History:  has a past surgical history that includes  section and Leg Surgery (Right, ). Assessment   Assessment: Per H&P note on 23, \"Maria Isabel See is a 75 y.o. female with pmh of psoriasis A-fib, diabetes, hypertension, hyperlipidemia, degenerative disc disease, osteoarthritis and anxiety who was well until August of this year when she developed COVID-19 infection. She did not require hospitalization. She fully recovered and then a month later she started to notice shortness of breath with activity. While standing she is fine but the sooner she starts to walk she feels very short of breath. There have been near syncopal episodes but has not actually had a syncopal episode. This progressively got worse and hence the reason for presentation today in the ED. In the ED EKG done was consistent with atrial fibrillation with a slow ventricular response in the 30s.  Blood pressure has been stable there has been no hemodynamic Shahram Nuñez         Timed Code Treatment Minutes: 29 Minutes (15 minute eval)       Electronically signed by YURIDIA Reyes on 12/11/2023 at 3:46 PM

## 2023-12-11 NOTE — CARE COORDINATION
Case Management Discharge Note          Date / Time of Note: 12/11/2023 3:14 PM                  Patient Name: Jack Rodriguez   YOB: 1950  Diagnosis: Bradycardia [R00.1]  Renal insufficiency [N28.9]  Abnormal CXR [R93.89]  Troponin level elevated [R79.89]  Elevated brain natriuretic peptide (BNP) level [R79.89]  Symptomatic bradycardia [R00.1]   Date / Time: 12/8/2023  4:54 PM    Financial:  Payor: Clarissa Rivera / Plan: [facility] [address] / Product Type: *No Product type* /      Pharmacy:    [facility] [id number] - [address] [facility], [address] [place] [address]  Phone: [phone] Fax: [phone]    [id number] [address] [facility], [address]  [id number] [place] [address]  Phone: [phone] Fax: [phone]      Assistance purchasing medications?: Potential Assistance Purchasing Medications: No  Assistance provided by Case Management: None at this time    DISCHARGE Disposition: Home- No Services Needed    Transportation:  Transportation PLAN for discharge: family   Mode of Transport: Private Car  Time of Transport: after 4pm    IMM Completed:   Yes, Case management has presented and reviewed IMM letter #2. IMM Letter date given[de-identified] 12/11/23  IMM Letter time given[de-identified] 1744. Patient and/or family/POA verbalized understanding of their medicare rights and appeal process if needed. Patient and/or family/POA has signed, initialed and placed the date and time on IMM letter #2 on the the appropriate lines. Copy of letter offered and they are aware that the original copy of IMM letter #2 is available prior to discharge from the paper chart on the unit. Electronic documentation has been entered into epic for IMM letter #2 and original paper copy has been added to the paper chart at the nurses station. Additional CM Notes:   Discharge order noted. Family to transport.

## 2023-12-11 NOTE — DISCHARGE SUMMARY
Hospital Medicine Discharge Summary    Patient ID: Luis Carlos Funes      Patient's PCP: Frankey Goodie, MD    Admit Date: 12/8/2023     Discharge Date:   12/11/2023    Admitting Provider: Althea Brady MD     Discharge Provider: Tiffanie Kiser MD     Discharge Diagnoses: Active Hospital Problems    Diagnosis     Primary hypertension [I10]     Troponin level elevated [R79.89]     Symptomatic bradycardia [R00.1]        The patient was seen and examined on day of discharge and this discharge summary is in conjunction with any daily progress note from day of discharge. Hospital Course:     From HPI:\"   Luis Carlos Funes is a 75 y.o. female with pmh of psoriasis A-fib, diabetes, hypertension, hyperlipidemia, degenerative disc disease, osteoarthritis and anxiety who was well until August of this year when she developed COVID-19 infection. She did not require hospitalization. She fully recovered and then a month later she started to notice shortness of breath with activity. While standing she is fine but the sooner she starts to walk she feels very short of breath. There have been near syncopal episodes but has not actually had a syncopal episode. This progressively got worse and hence the reason for presentation today in the ED. In the ED EKG done was consistent with atrial fibrillation with a slow ventricular response in the 30s. Blood pressure has been stable there has been no hemodynamic compromise. Blood work done showed mild SURAJ with creatinine of 1.6 baseline is 1.0 bicarb of 18 potassium was 5.2 lactic acid was 3.6 blood glucose was 273 and BNP was 4428. Last echocardiogram in our system is from 2021 which showed an EF of 50 to 55% mild tricuspid regurgitation estimated pulmonary artery systolic pressure was normal at 28. Initial troponin was 63. Patient is on metoprolol 50 mg twice daily. Patient denies paroxysmal nocturnal dyspnea or orthopnea.   She typically uses 2 pillows

## 2023-12-11 NOTE — PROGRESS NOTES
Pt just transferred from CVICU. Pt was situated into her chair by RN from . Pt is alert and oriented. VS obtained and call light given to Pt. Confirmed on monitor by CMU.

## 2023-12-11 NOTE — PROGRESS NOTES
Physical Therapy  Facility/Department: [address] PROGRESSIVE CARE  Physical Therapy Initial Assessment/Discharge Note    Name: Cristiano Ghosh  : 1950  MRN: [id number]  Date of Service: 2023    Discharge Recommendations:  Home with assist PRN   PT Equipment Recommendations  Equipment Needed: No      Cristiano Ghosh scored a 22/24 on the AM-PAC short mobility form. At this time, no further PT is recommended upon discharge. Recommend patient returns to prior setting with prior services. Patient Diagnosis(es): The primary encounter diagnosis was Symptomatic bradycardia. Diagnoses of Elevated brain natriuretic peptide (BNP) level, Renal insufficiency, Troponin level elevated, and Abnormal CXR were also pertinent to this visit. 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 Past Surgical History:  has a past surgical history that includes  section and Leg Surgery (Right, ). Assessment   Assessment: pt is a 76 yo female who was adm to hosp with pmh of psoriasis A-fib, diabetes, hypertension, hyperlipidemia, degenerative disc disease, osteoarthritis and anxiety who was well until August of this year when she developed COVID-19 infection. She did not require hospitalization. She fully recovered and then a month later she started to notice shortness of breath with activity. While standing she is fine but the sooner she starts to walk she feels very short of breath. There have been near syncopal episodes but has not actually had a syncopal episode. Pt reports feeling much better since her medicine was changed; pt appears to be slightly below her baseline but safe to return home with PRN assist from her daughter who lives next door.   Pt declined home therapy  Therapy Prognosis: Good  Decision Making: Low Complexity  No Skilled PT: Safe to return rails  Entrance Stairs - Number of Steps: 6 to front door with railing on L side +16 to bed/bathroom with railing on R side  Entrance Stairs - Rails: Left  Bathroom Shower/Tub: Tub/Shower unit  Bathroom Toilet: Standard (vanity next to toilet)  Bathroom Equipment: Grab bars in shower, Commode  Home Equipment: Laban Settles, rolling, Cane  Has the patient had two or more falls in the past year or any fall with injury in the past year?: No  Receives Help From: Family  ADL Assistance: [address] Independent  Homemaking Responsibilities: Yes (does light meal prepping/microwaves most meals)  Ambulation Assistance: Independent (\"furniture walks\")  Transfer Assistance: Independent  Active : Yes  Occupation: Full time employment  Type of Occupation: [employer] 8 hours/day 40 hours/week  Additional Comments: daughter lives next door  Vision/Hearing  Vision  Vision: Within Functional Limits  Hearing  Hearing: Within functional limits    Cognition   Orientation  Overall Orientation Status: Within Functional Limits  Cognition  Overall Cognitive Status: WFL     Objective        Observation/Palpation  Observation: swelling BLEs observed        AROM RLE (degrees)  RLE AROM: WFL  AROM LLE (degrees)  LLE AROM : WFL  Strength RLE  Strength RLE: WFL  Strength LLE  Strength LLE: WFL           Bed mobility  Bed Mobility Comments: unable to assess this date; pt seated in recliner at beginning and end of session.   Transfers  Sit to Stand: Stand by assistance  Stand to Sit: Stand by assistance  Ambulation  Surface: Level tile  Device: No Device  Assistance: Stand by assistance  Quality of Gait: increased lateral sway due to body habitus; no LOB  Distance: 100'  Comments: positioned in chair at end of session with all needs in reach and LEs elevated  Stairs/Curb  Stairs?: Yes  Stairs  # Steps : 12  Stairs Height: 8\"  Rails: Left ascending;Bilateral  Device: No Device  Assistance: Contact guard assistance;Stand by

## 2023-12-11 NOTE — ACP (ADVANCE CARE PLANNING)
Advance Care Planning     Advance Care Planning Activator (Inpatient)  Conversation Note      Date of ACP Conversation: 12/11/2023     Conversation Conducted with: Patient with Decision Making Capacity    ACP Activator: Terrell Patterson RN    Health Care Decision Maker:     Current Designated Health Care Decision Maker:     Primary Decision Maker: Melvin Yates Child - [phone]    Primary Decision Maker: Manny [place] Child - [phone]    Today we documented Decision Maker(s) consistent with Legal Next of Kin hierarchy. Care Preferences    Ventilation: \"If you were in your present state of health and suddenly became very ill and were unable to breathe on your own, what would your preference be about the use of a ventilator (breathing machine) if it were available to you? \"      Would the patient desire the use of ventilator (breathing machine)?: yes    \"If your health worsens and it becomes clear that your chance of recovery is unlikely, what would your preference be about the use of a ventilator (breathing machine) if it were available to you? \"     Would the patient desire the use of ventilator (breathing machine)?: Yes      Resuscitation  \"CPR works best to restart the heart when there is a sudden event, like a heart attack, in someone who is otherwise healthy. Unfortunately, CPR does not typically restart the heart for people who have serious health conditions or who are very sick. \"    \"In the event your heart stopped as a result of an underlying serious health condition, would you want attempts to be made to restart your heart (answer \"yes\" for attempt to resuscitate) or would you prefer a natural death (answer \"no\" for do not attempt to resuscitate)? \" yes       [] Yes   [] No   Educated Patient / Cole Dimas regarding differences between Advance Directives and portable DNR orders.     Length of ACP Conversation in minutes:      Conversation Outcomes:  ACP discussion completed    Follow-up

## 2023-12-11 NOTE — PROGRESS NOTES
4 Eyes Skin Assessment     NAME:  Maria Isabel Becker  YOB: 1950  MEDICAL RECORD NUMBER:  [id number]    The patient is being assessed for  Transfer to New Unit    I agree that at least one RN has performed a thorough Head to Toe Skin Assessment on the patient. ALL assessment sites listed below have been assessed. Areas assessed by both nurses:    Head, Face, Ears, Shoulders, Back, Chest, Arms, Elbows, Hands, Sacrum. Buttock, Coccyx, Ischium, Legs. Feet and Heels, and Under Medical Devices         Does the Patient have a Wound?  No noted wound(s)       Renaldo Prevention initiated by RN: Yes  Wound Care Orders initiated by RN: Yes    Pressure Injury (Stage 3,4, Unstageable, DTI, NWPT, and Complex wounds) if present, place Wound referral order by RN under : No    New Ostomies, if present place, Ostomy referral order under : No     Nurse 1 eSignature: Electronically signed by Lakeisha Ernst RN on 12/10/23 at 8:12 PM EST    **SHARE this note so that the co-signing nurse can place an eSignature**    Nurse 2 eSignature: Electronically signed by Phoebe Whaley RN on 12/11/23 at 6:33 AM EST

## 2023-12-11 NOTE — PROGRESS NOTES
Pt being discharged to home. All medications reviewed on AVS. All questions answered. All discharge appts reviewed with Pt. Son is picking Pt up at [facility].

## 2023-12-11 NOTE — TELEPHONE ENCOUNTER
Dr. Bee Pettit saw patient at [facility] over the weekend. Echocardiogram unable to be done while inpatient. Dr. Bee Pettit states she can have OP Echocardiogram.    Attempted to call hospital room, no answer, attempted Mobile number, no answer. Spoke to RN who states patient has not been discharged yet. OP order has been placed. Spoke to Rianan who states patient can be placed on schedule for   Friday December 22 at 0930. Please call to schedule and confirm with patient.

## 2023-12-11 NOTE — CARE COORDINATION
Case Management Assessment  Initial Evaluation    Date/Time of Evaluation: 12/11/2023 12:37 PM  Assessment Completed by: Braydon Cordova RN    If patient is discharged prior to next notation, then this note serves as note for discharge by case management. Patient Name: Kendra Molina                   YOB: 1950  Diagnosis: Bradycardia [R00.1]  Renal insufficiency [N28.9]  Abnormal CXR [R93.89]  Troponin level elevated [R79.89]  Elevated brain natriuretic peptide (BNP) level [R79.89]  Symptomatic bradycardia [R00.1]                   Date / Time: 12/8/2023  4:54 PM    Patient Admission Status: Inpatient   Readmission Risk (Low < 19, Mod (19-27), High > 27): Readmission Risk Score: 17.7    Current PCP: Orman Habermann, MD  PCP verified by ? Yes    Chart Reviewed: Yes      History Provided by: Patient  Patient Orientation: Alert and Oriented    Patient Cognition: Alert    Hospitalization in the last 30 days (Readmission):  No    If yes, Readmission Assessment in  Navigator will be completed.     Advance Directives:      Code Status: Full Code   Patient's Primary Decision Maker is: Legal Next of Kin    Primary Decision MakerCjohn Cahn Child - [phone]    Primary Decision Maker: Esmer Austin - [phone]    Discharge Planning:    Patient lives with: Alone Type of Home: House  Primary Care Giver:  self  Patient Support Systems include: Children, Family Members, Friends/Neighbors   Current Financial resources: Medicare  Current community resources: None  Current services prior to admission: Durable Medical Equipment            Current DME: Jeancarlos Chaidez (pt has DME but does not use)            Type of Home Care services:  None    ADLS  Prior functional level: Assistance with the following:, Mobility  Current functional level: Assistance with the following:, Mobility    PT AM-PAC:   pending/24  OT AM-PAC:   pending/24    Family can provide assistance at and/or Patient Representative Agree with the Discharge Plan?  Yes    Braydon Cordova RN  Case Management Department  Ph: [phone]

## 2023-12-12 ENCOUNTER — CARE COORDINATION (OUTPATIENT)
Dept: CASE MANAGEMENT | Age: 73
End: 2023-12-12

## 2023-12-12 DIAGNOSIS — R79.89 TROPONIN LEVEL ELEVATED: Primary | ICD-10-CM

## 2023-12-12 LAB
BACTERIA BLD CULT ORG #2: NORMAL
BACTERIA BLD CULT: NORMAL

## 2023-12-12 PROCEDURE — 1111F DSCHRG MED/CURRENT MED MERGE: CPT

## 2023-12-12 NOTE — CARE COORDINATION
Care Transitions Initial Follow Up Call    Call within 2 business days of discharge: Yes    Patient Current Location:  Home: [place] [address]    Care Transition Nurse contacted the patient by telephone to perform post hospital discharge assessment. Verified name and  with patient as identifiers. Provided introduction to self, and explanation of the Care Transition Nurse role. Patient: Mehrdad Emocent Patient : 1950   MRN: [id number]  Reason for Admission: SOB  Discharge Date: 23 RARS: Readmission Risk Score: 15      Last Discharge Facility       Date Complaint Diagnosis Description Type Department Provider    23 Shortness of Breath Symptomatic bradycardia . .. ED to Hosp-Admission (Discharged) (ADMITTED) [facility] Alla Lugo MD; [facility]. .. Was this an external facility discharge? No Discharge Facility: [facility] to be reviewed by the provider   Additional needs identified to be addressed with provider: No                 Method of communication with provider: none. Initial attempt at CT discharge phone call. Jasmin Roth states she is doing \"great. \" She states she will reach out to the PCP to schedule a HFU appt. She states she will provide her own transportation. Jasmin Roth denies any SOB at this time. She states she continues to have some lower extremity edema. She states it is slightly better than when she was in the hospital. Jasmin Roth denies any needs at this time. Care Transition Nurse reviewed discharge instructions with patient who verbalized understanding. The patient was given an opportunity to ask questions and does not have any further questions or concerns at this time. Were discharge instructions available to patient? Yes. Reviewed appropriate site of care based on symptoms and resources available to patient including: PCP  Specialist  When to call 911.  The patient agrees to contact the PCP office for questions related to

## 2023-12-27 NOTE — TELEPHONE ENCOUNTER
Letter received that echo is approved from 12/18/23-1/18/2024. The echo is currently scheduled for 1/31/2023, can we please move this up to the approved dates? Thanks. Placed letter in scan folder to be scanned to media.

## 2023-12-28 NOTE — TELEPHONE ENCOUNTER
Able to r/s patient for [facility] location for tomorrow, 12/29 but cannot contact patient to inform. Called daughter's contact #, could not LVM for either number.